# Patient Record
Sex: FEMALE | Employment: FULL TIME | URBAN - METROPOLITAN AREA
[De-identification: names, ages, dates, MRNs, and addresses within clinical notes are randomized per-mention and may not be internally consistent; named-entity substitution may affect disease eponyms.]

---

## 2017-02-02 ENCOUNTER — OFFICE VISIT (OUTPATIENT)
Dept: INTERNAL MEDICINE | Facility: CLINIC | Age: 32
End: 2017-02-02

## 2017-02-02 VITALS
HEART RATE: 68 BPM | HEIGHT: 64 IN | BODY MASS INDEX: 20.74 KG/M2 | RESPIRATION RATE: 16 BRPM | OXYGEN SATURATION: 99 % | SYSTOLIC BLOOD PRESSURE: 138 MMHG | DIASTOLIC BLOOD PRESSURE: 87 MMHG | WEIGHT: 121.5 LBS

## 2017-02-02 DIAGNOSIS — S76.302D LEFT HAMSTRING INJURY, SUBSEQUENT ENCOUNTER: Primary | ICD-10-CM

## 2017-02-02 DIAGNOSIS — R63.6 UNDERWEIGHT: ICD-10-CM

## 2017-02-02 ASSESSMENT — PAIN SCALES - GENERAL: PAINLEVEL: NO PAIN (0)

## 2017-02-02 NOTE — PATIENT INSTRUCTIONS
Primary Care Center Phone Number [phone]  Primary Care Center Medication Refill Request Information:  * Please contact your pharmacy regarding ANY request for medication refills.  ** [facility] Prescription Fax = [phone]  * Please allow 3 business days for routine medication refills.  * Please allow 5 business days for controlled substance medication refills.     Primary Care Center Test Result notification information:  *You will be notified with in 7-10 days of your appointment day regarding the results of your test.  If you are on MyChart you will be notified as soon as the provider has reviewed the results and signed off on them.        Schedule with Mary- her office will get in touch with you to schedule.    Referral for PT  Come back in about 1 month and we'll do a full physical and pap smear, breast exam, etc at that time.

## 2017-02-02 NOTE — NURSING NOTE
Chief Complaint   Patient presents with     Establish Care     pt is here to establish care with new PCP       Eileen Duran CMA at 3:54 PM on 2/2/2017

## 2017-02-02 NOTE — MR AVS SNAPSHOT
After Visit Summary   2/2/2017    Maria De Jesus Hess    MRN: [id number]           Patient Information     Date Of Birth          1985        Visit Information        Provider Department      2/2/2017 4:05 PM Majo Doherty MD [facility]        Today's Diagnoses     Left hamstring injury, subsequent encounter    -  1     Underweight           Care Instructions    Primary Care Center Phone Number [phone]  Primary Care Center Medication Refill Request Information:  * Please contact your pharmacy regarding ANY request for medication refills.  ** [facility] Prescription Fax = [phone]  * Please allow 3 business days for routine medication refills.  * Please allow 5 business days for controlled substance medication refills.     Primary Care Center Test Result notification information:  *You will be notified with in 7-10 days of your appointment day regarding the results of your test.  If you are on MyChart you will be notified as soon as the provider has reviewed the results and signed off on them.        Schedule with Mary-  office will get in touch with you to schedule.    Referral for PT  Come back in about 1 month and we'll do a full physical and pap smear, breast exam, etc at that time.         Follow-ups after your visit        Additional Services     NUTRITION REFERRAL       Your provider has referred you to: UMP:Mary Song CSC- she will call to schedule    Please be aware that coverage of these services is subject to the terms and limitations of your health insurance plan.  Call member services at your health plan with any benefit or coverage questions.      Please bring the following with you to your appointment:    (1) This referral request  (2) Any documents given to you regarding this referral  (3) Any specific questions you have about diet and/or food choices            PHYSICAL THERAPY REFERRAL       [facility] in [place]                  Who to  "contact     Please call your clinic at [phone] to:    Ask questions about your health    Make or cancel appointments    Discuss your medicines    Learn about your test results    Speak to your doctor   If you have compliments or concerns about an experience at your clinic, or if you wish to file a complaint, please contact [facility] Patient Relations at [phone] or email us at [email or web address]         Additional Information About Your Visit        MeriTaleemhart Information     Vivisimo is an electronic gateway that provides easy, online access to your medical records. With Vivisimo, you can request a clinic appointment, read your test results, renew a prescription or communicate with your care team.     To sign up for Vivisimo visit the website at [email or web address]   You will be asked to enter the access code listed below, as well as some personal information. Please follow the directions to create your username and password.     Your access code is: [email or web address]  Expires: 2017  6:30 AM     Your access code will  in 90 days. If you need help or a new code, please contact your [facility] or call [phone] for assistance.        Care EveryWhere ID     This is your Care EveryWhere ID. This could be used by other organizations to access your [facility] medical records  [id number]        Your Vitals Were     Pulse Respirations Height BMI (Body Mass Index) Pulse Oximetry Last Period    68 16 1.626 m (5' 4\") 20.85 kg/m2 99% 2017    Breastfeeding?                   No            Blood Pressure from Last 3 Encounters:   17 138/87    Weight from Last 3 Encounters:   17 55.112 kg (121 lb 8 oz)              We Performed the Following     NUTRITION REFERRAL     PHYSICAL THERAPY REFERRAL        Primary Care Provider    Pcp Unknown Verified       No address on file        Thank you!     Thank you for choosing  [facility] " [facility] CLINIC  for your care. Our goal is always to provide you with excellent care. Hearing back from our patients is one way we can continue to improve our services. Please take a few minutes to complete the written survey that you may receive in the mail after your visit with us. Thank you!             Your Updated Medication List - Protect others around you: Learn how to safely use, store and throw away your medicines at www.disposemymeds.org.      Notice  As of 2/2/2017  4:09 PM    You have not been prescribed any medications.

## 2017-02-02 NOTE — PROGRESS NOTES
"  SUBJECTIVE:                                                    Maria De Jesus Hess is a 31 year old female who presents to clinic today for the following health issues:    New Patient/Transfer of Care  No usual care. Would like a referral for PT for a hamstring injury.   Is a runner ongoing issue for last 3 years, started working at the [employer], now PT for insurance needs referral for physcial therpay. Has been seeing [facility] in [place] for last several months and would like to maintain that relationship, she reports she continues to make progress  Marathon running, last run [employer] [place] in [place], upcoming in May , [facility] [place]    Not sure when last pap smear was. May have been through [employer] at former work. May have been more than 3 years, needs to get back to work.     -------------------------------------    Problem list and histories reviewed & adjusted, as indicated.  Additional history: as documented    BP Readings from Last 3 Encounters:   02/02/17 138/87    Wt Readings from Last 3 Encounters:   02/02/17 55.112 kg (121 lb 8 oz)                    ROS:  Constitutional, HEENT, cardiovascular, pulmonary, gi and gu systems are negative, except as otherwise noted.    OBJECTIVE:                                                    /87 mmHg  Pulse 68  Resp 16  Ht 1.626 m (5' 4\")  Wt 55.112 kg (121 lb 8 oz)  BMI 20.85 kg/m2  SpO2 99%  LMP 01/22/2017  Breastfeeding? No  Body mass index is 20.85 kg/(m^2).  GENERAL: healthy, alert and no distress  EYES: Eyes grossly normal to inspection  HENT: ear canals and TM's normal, nose and mouth without ulcers or lesions  NECK: no adenopathy, no asymmetry, masses, or scars and thyroid normal to palpation  RESP: lungs clear to auscultation - no rales, rhonchi or wheezes  CV: regular rate and rhythm, normal S1 S2, no S3 or S4, no murmur, click or rub, no peripheral edema and peripheral pulses strong  ABDOMEN: soft, nontender, no hepatosplenomegaly, no masses " and bowel sounds normal  MS: no gross musculoskeletal defects noted, no edema  SKIN: no suspicious lesions or rashes  NEURO: Normal strength and tone, mentation intact and speech normal  PSYCH: mentation appears normal, affect normal/bright    Diagnostic Test Results:  none      ASSESSMENT/PLAN:                                                    1. Left hamstring injury, subsequent encounter  Refer PT external since not wanting to disrupt existing plan  - PHYSICAL THERAPY REFERRAL    2. Underweight  Refer Mary Song, has in past had problems with eating enough during training sessions for marathons  - NUTRITION REFERRAL    See Patient Instructions    Majo Doherty MD  [facility] PR[facility]

## 2017-02-15 ENCOUNTER — ALLIED HEALTH/NURSE VISIT (OUTPATIENT)
Dept: INTERNAL MEDICINE | Facility: CLINIC | Age: 32
End: 2017-02-15

## 2017-02-15 VITALS — BODY MASS INDEX: 19.86 KG/M2 | HEIGHT: 66 IN | WEIGHT: 123.6 LBS

## 2017-02-15 DIAGNOSIS — Z71.3 DIETARY COUNSELING: ICD-10-CM

## 2017-02-15 DIAGNOSIS — R63.0 ANOREXIA: Primary | ICD-10-CM

## 2017-02-15 DIAGNOSIS — E46 UNSPECIFIED PROTEIN-CALORIE MALNUTRITION (H): ICD-10-CM

## 2017-02-15 NOTE — MR AVS SNAPSHOT
After Visit Summary   2/15/2017    Maria De Jesus Hess    MRN: [id number]           Patient Information     Date Of Birth          1985        Visit Information        Provider Department      2/15/2017 12:00 PM Mary Song RD M [facility]        Today's Diagnoses     Anorexia    -  1    Unspecified protein-calorie malnutrition (H)        Dietary counseling           Follow-ups after your visit        Your next 10 appointments already scheduled     Feb 22, 2017  1:00 PM CST   NUTRITION VISIT with CRISTINA Morales [facility] ([facility])    [address] [address]   [phone]            Mar 01, 2017 12:00 PM CST   NUTRITION VISIT with CRISTINA Morales [facility] ([facility])    [address] [place] [address] [phone]            Mar 08, 2017 12:00 PM CST   NUTRITION VISIT with CRISTINA Morales [facility] ([facility])    [address] [address] [phone]            Mar 15, 2017 12:00 PM CDT   NUTRITION VISIT with CRISTINA Morales [facility] ([facility])    [address] [address] [phone]            Mar 22, 2017 12:00 PM CDT   NUTRITION VISIT with CRISTINA Morales [facility] ([facility])    [address] [address] [phone]            Mar 29, 2017 12:00 PM CDT   NUTRITION VISIT with CRISTINA Morales [email or web address] ([facility])    [address] [address] [phone]            Apr 12, 2017 12:00 PM CDT   NUTRITION VISIT with CRISTINA Morales  [facility] [employer] ([facility])    [address] [phone] [phone]            Apr 19, 2017 12:00 PM CDT   NUTRITION VISIT with CRISTINA Morales [facility] ([facility])    [address] [place] [id number] [phone]            Apr 26, 2017 12:00 PM CDT   NUTRITION VISIT with CRISTINA Morales [facility] [employer] [facility] ([facility])    [address] [place] [id number] [phone]            May 03, 2017 12:00 PM CDT   NUTRITION VISIT with CRISTINA Morales [facility] [employer] [facility] [facility] ([facility])    [address] [place] [id number] [phone]              Who to contact     Please call your clinic at [phone] to:    Ask questions about your health    Make or cancel appointments    Discuss your medicines    Learn about your test results    Speak to your doctor   If you have compliments or concerns about an experience at your clinic, or if you wish to file a complaint, please contact [facility] Patient Relations at [phone] or email us at [email or web address]         Additional Information About Your Visit        MyChart Information     Statt gives you secure access to your electronic health record. If you see a primary care provider, you can also send messages to your care team and make appointments. If you have questions, please call your primary care clinic.  If you do not have a primary care provider, please call [phone] and they will assist you.      [employer] is an electronic gateway that provides easy, online access to your medical records. With [employer], you can request a clinic appointment, read your test results, renew a prescription or communicate with your care  "team.     To access your existing account, please contact your [facility] or call [phone] for assistance.        Care EveryWhere ID     This is your Care EveryWhere ID. This could be used by other organizations to access your [facility] medical records  [id number]        Your Vitals Were     Height Last Period BMI (Body Mass Index)             1.676 m (5' 6\") 01/22/2017 19.95 kg/m2          Blood Pressure from Last 3 Encounters:   02/02/17 138/87    Weight from Last 3 Encounters:   02/15/17 56.1 kg (123 lb 9.6 oz)   02/02/17 55.1 kg (121 lb 8 oz)              We Performed the Following     MNT INDIVIDUAL INITIAL EA 15 MIN        Primary Care Provider Office Phone # Fax #    Majo Doherty [phone]       PRIMARY CARE CENTER [address] [place] [address]        Thank you!     Thank you for choosing  [facility]  for your care. Our goal is always to provide you with excellent care. Hearing back from our patients is one way we can continue to improve our services. Please take a few minutes to complete the written survey that you may receive in the mail after your visit with us. Thank you!             Your Updated Medication List - Protect others around you: Learn how to safely use, store and throw away your medicines at www.disposemymeds.org.      Notice  As of 2/15/2017 11:59 PM    You have not been prescribed any medications.      "

## 2017-02-17 NOTE — PROGRESS NOTES
"Maria De Jesus Hess  is a 31 year old female presents today for new nutrition consultation.  Maria De Jesus has been struggling with eating disorder for a long time.  She worked with me at [facility] prior to transferring her care to [facility].  The progress of treatment has been very slow due to strong eating disorder.  Short summary of prior work that focused on disruption of eating disorder symptoms (restriction), increases in food intake, decreases in physical activity and strong recommendation to work with therapist.     Vitals:  Ht 1.676 m (5' 6\")  Wt 56.1 kg (123 lb 9.6 oz)  LMP 01/22/2017  BMI 19.95 kg/m2      Nutrition History  Patient is on a regular  diet at home.  Recall:  B: none  L: 11:30:  String cheese, small coffee:  Starbucks Skinny caramel machiato  D: very small salad with cabbage salad, apple, radish, almonds,   Sn: greek yogurt and strawberries      Physical Activity  Training for marathon.  Continues to struggle with injuries, which are most likley related to restriction.      Time with Patient:  60 Minutes    Nutrition  DX:.   No diagnosis found.      Nutrition Goals:   Long term nutrition goals:  1:  Elimination of eating disorder symptoms  2:  Increase food intake to meet her nutritions and elliminate nutrient deficits that contribute to her bone health, reproductive health, and overall health.  3:  Nutrition for performance (physical, cognitive) and health.  4:  Start her work with therapist to help with recovery from eating disorder    Short term nutrition goals:  1:  Have banana for breakfast  2:  Tracking her food intake  3:  Check her insurance for recommendations for therapist.           Mary Song RD  [facility]    "

## 2017-02-22 ENCOUNTER — ALLIED HEALTH/NURSE VISIT (OUTPATIENT)
Dept: INTERNAL MEDICINE | Facility: CLINIC | Age: 32
End: 2017-02-22

## 2017-02-22 DIAGNOSIS — Z71.3 DIETARY COUNSELING: Primary | ICD-10-CM

## 2017-02-22 DIAGNOSIS — E46 UNSPECIFIED PROTEIN-CALORIE MALNUTRITION (H): ICD-10-CM

## 2017-02-22 DIAGNOSIS — F50.019 ANOREXIA NERVOSA, RESTRICTING TYPE: ICD-10-CM

## 2017-02-22 NOTE — PROGRESS NOTES
Maria De Jesus Hess  is a 31 year old female presents today for follow-up nutrition consultation.  Maria De Jesus was able to have a bread with PB after her morning run 2 times this last week.  We talked about options for therapist.       Vitals:  Wt Readings from Last 4 Encounters:   02/23/17 54.9 kg (121 lb 1.6 oz)   02/15/17 56.1 kg (123 lb 9.6 oz)   02/02/17 55.1 kg (121 lb 8 oz)       Nutrition History  Patient is on a regular  diet at home.  Recall:  B: 6:45: bread with PB  L: 11:30: Starbucks Skinny caramel machiato  D: very small salad with cabbage salad, apple, radish, almonds,   Sn: greek yogurt and strawberries      Physical Activity  Training for marathon.  Continues to struggle with injuries, which are most likley related to restriction.  She was running about 45 miles last week.     Time with Patient:  60 Minutes    Nutrition  DX:.   1. Dietary counseling    2. Anorexia nervosa, restricting type    3. Unspecified protein-calorie malnutrition (H)          Nutrition Goals:   Long term nutrition goals:  1:  Elimination of eating disorder symptoms  2:  Increase food intake to meet her nutritions and elliminate nutrient deficits that contribute to her bone health, reproductive health, and overall health.  3:  Nutrition for performance (physical, cognitive) and health.  4:  Start her work with therapist to help with recovery from eating disorder    Short term nutrition goals:  1: Tracking food intake  2: Continue with part of her breakfast after run  3: Try to add food before her run in the morning   4:  Will continue figuring out the recommendation for therapist    CRISTINA Morales [facility] SP[facility]IC[facility]

## 2017-02-22 NOTE — MR AVS SNAPSHOT
After Visit Summary   2/22/2017    Maria De Jesus Hess    MRN: [id number]           Patient Information     Date Of Birth          1985        Visit Information        Provider Department      2/22/2017 1:00 PM Mary Song RD [facility]        Today's Diagnoses     Dietary counseling    -  1    Anorexia nervosa, restricting type        Unspecified protein-calorie malnutrition (H)           Follow-ups after your visit        Your next 10 appointments already scheduled     Mar 03, 2017 12:00 PM CST   NUTRITION VISIT with CRISTINA Morales [facility] ([facility])    [address] [place] [address]   [phone]            Mar 08, 2017 12:00 PM CST   NUTRITION VISIT with CRISTINA Morales [facility] ([facility])    [address] [place] [id number] [phone]            Mar 15, 2017 12:00 PM CDT   NUTRITION VISIT with CRISTINA Morales [facility] ([facility])    [address] [place] [address] [phone]            Mar 22, 2017 12:00 PM CDT   NUTRITION VISIT with CRISTINA Morales [facility] ([facility])    [address] [place] [address] [phone]            Mar 29, 2017 12:00 PM CDT   NUTRITION VISIT with CRISTINA Morales [facility] ([facility])    [address] [place] [address] [phone]            Apr 12, 2017 12:00 PM CDT   NUTRITION VISIT with CRISTINA Morales [facility] ([facility])    [address] [place] [address] [phone]            Apr 19, 2017 12:00 PM CDT   NUTRITION VISIT  with CRISTINA Morales [facility] ([facility])    [address] [phone]            Apr 26, 2017 12:00 PM CDT   NUTRITION VISIT with CRISTINA Morales [facility] ([facility])    [address] [phone] [phone]            May 03, 2017 12:00 PM CDT   NUTRITION VISIT with CRISTINA Morales [facility] ([facility])    [address] [phone] [phone]            May 10, 2017 12:00 PM CDT   NUTRITION VISIT with CRISTINA Morales [facility] [employer] ([facility])    [address] [place] [phone] [phone]              Who to contact     Please call your clinic at [phone] to:    Ask questions about your health    Make or cancel appointments    Discuss your medicines    Learn about your test results    Speak to your doctor   If you have compliments or concerns about an experience at your clinic, or if you wish to file a complaint, please contact [facility] Patient Relations at [phone] or email us at [email or web address]         Additional Information About Your Visit        Idylishart Information     Apps[employer]t gives you secure access to your electronic health record. If you see a primary care provider, you can also send messages to your care team and make appointments. If you have questions, please call your primary care clinic.  If you do not have a primary care provider, please call [phone] and they will assist you.      [employer] is an electronic gateway that provides easy, online access to your medical records. With [employer], you can request a clinic appointment, read your test results, renew a prescription or  "communicate with your care team.     To access your existing account, please contact your [facility] or call [phone] for assistance.        Care EveryWhere ID     This is your Care EveryWhere ID. This could be used by other organizations to access your [place] medical records  [id number]        Your Vitals Were     Height Last Period BMI (Body Mass Index)             1.626 m (5' 4\") 01/22/2017 20.79 kg/m2          Blood Pressure from Last 3 Encounters:   02/02/17 138/87    Weight from Last 3 Encounters:   02/23/17 54.9 kg (121 lb 1.6 oz)   02/15/17 56.1 kg (123 lb 9.6 oz)   02/02/17 55.1 kg (121 lb 8 oz)              We Performed the Following     MNT INDIVIDUAL F/U REASSESS, EA 15 MIN        Primary Care Provider Office Phone # Fax #    Majo Doherty [phone]       PRIMARY CARE CENTER [address] [place] [address]        Thank you!     Thank you for choosing  [facility]  for your care. Our goal is always to provide you with excellent care. Hearing back from our patients is one way we can continue to improve our services. Please take a few minutes to complete the written survey that you may receive in the mail after your visit with us. Thank you!             Your Updated Medication List - Protect others around you: Learn how to safely use, store and throw away your medicines at www.disposemymeds.org.      Notice  As of 2/22/2017 11:59 PM    You have not been prescribed any medications.      "

## 2017-02-23 VITALS — HEIGHT: 64 IN | WEIGHT: 121.1 LBS | BODY MASS INDEX: 20.67 KG/M2

## 2017-03-03 ENCOUNTER — ALLIED HEALTH/NURSE VISIT (OUTPATIENT)
Dept: INTERNAL MEDICINE | Facility: CLINIC | Age: 32
End: 2017-03-03

## 2017-03-03 DIAGNOSIS — F50.019 ANOREXIA NERVOSA, RESTRICTING TYPE: ICD-10-CM

## 2017-03-03 DIAGNOSIS — Z71.3 DIETARY COUNSELING: Primary | ICD-10-CM

## 2017-03-03 DIAGNOSIS — E46 UNSPECIFIED PROTEIN-CALORIE MALNUTRITION (H): ICD-10-CM

## 2017-03-03 NOTE — MR AVS SNAPSHOT
After Visit Summary   3/3/2017    Maria De Jesus Hess    MRN: [id number]           Patient Information     Date Of Birth          1985        Visit Information        Provider Department      3/3/2017 12:00 PM Mary Song RD M [facility]        Today's Diagnoses     Dietary counseling    -  1    Unspecified protein-calorie malnutrition (H)        Anorexia nervosa, restricting type           Follow-ups after your visit        Your next 10 appointments already scheduled     Mar 08, 2017 12:00 PM CST   NUTRITION VISIT with CRISTINA Morales [facility] ([facility])    [address] [place] [address] [phone]            Mar 15, 2017 12:00 PM CDT   NUTRITION VISIT with CRISTINA Morales [facility] ([facility])    [address] [place] [address] [phone]            Mar 22, 2017 12:00 PM CDT   NUTRITION VISIT with CRISTINA Morales [facility] ([facility])    [address] [place] [address] [phone]            Mar 29, 2017 12:00 PM CDT   NUTRITION VISIT with CRISTINA Morales [facility] ([facility])    [address] [place] [address] [phone]            Apr 12, 2017 12:00 PM CDT   NUTRITION VISIT with CRISTINA Morales [facility] ([facility])    [address] [place] [address] [phone]            Apr 19, 2017 12:00 PM CDT   NUTRITION VISIT with CRISTINA Morales [facility] ([facility])    [address] [place] [address] [phone]            Apr 26, 2017 12:00 PM CDT   NUTRITION VISIT  with CRISTINA Morales [facility] ([facility])    [address] [phone] [phone]            May 03, 2017 12:00 PM CDT   NUTRITION VISIT with CRISTINA Morales [facility] ([facility])    [address] [address] [phone]            May 10, 2017 12:00 PM CDT   NUTRITION VISIT with CRISTINA Morales [facility] ([facility])    [address] [address] [phone]            May 17, 2017 12:00 PM CDT   NUTRITION VISIT with CRISTINA Morales [facility] ([facility])    [address] [place] [address]-48[address] [phone]              Who to contact     Please call your clinic at [phone] to:    Ask questions about your health    Make or cancel appointments    Discuss your medicines    Learn about your test results    Speak to your doctor   If you have compliments or concerns about an experience at your clinic, or if you wish to file a complaint, please contact [facility] Patient Relations at [phone] or email us at [email or web address]         Additional Information About Your Visit        QingCloudhart Information     [employer] gives you secure access to your electronic health record. If you see a primary care provider, you can also send messages to your care team and make appointments. If you have questions, please call your primary care clinic.  If you do not have a primary care provider, please call [phone] and they will assist you.      [employer]Wise is an electronic gateway that provides easy, online access to your medical records. With vWise, you can request a clinic appointment, read your test results, renew a prescription or  communicate with your care team.     To access your existing account, please contact your [facility] or call [phone] for assistance.        Care EveryWhere ID     This is your Care EveryWhere ID. This could be used by other organizations to access your [place] medical records  [id number]        Your Vitals Were     Last Period                   01/22/2017            Blood Pressure from Last 3 Encounters:   02/02/17 138/87    Weight from Last 3 Encounters:   02/23/17 54.9 kg (121 lb 1.6 oz)   02/15/17 56.1 kg (123 lb 9.6 oz)   02/02/17 55.1 kg (121 lb 8 oz)              We Performed the Following     MNT INDIVIDUAL F/U REASSESS, EA 15 MIN        Primary Care Provider Office Phone # Fax #    Majo Doherty [phone]       PRIMARY [facility]ARE CENTER [address] [place] [address]        Thank you!     Thank you for choosing  [facility]  for your care. Our goal is always to provide you with excellent care. Hearing back from our patients is one way we can continue to improve our services. Please take a few minutes to complete the written survey that you may receive in the mail after your visit with us. Thank you!             Your Updated Medication List - Protect others around you: Learn how to safely use, store and throw away your medicines at www.disposemymeds.org.      Notice  As of 3/3/2017  1:34 PM    You have not been prescribed any medications.

## 2017-03-03 NOTE — PROGRESS NOTES
Maria De Jesus Hess  is a 31 year old female presents today for follow-up nutrition consultation. Maria De Jesus has been trying to work hard on her nutrition, but is is really challenged to find a therapist.     Vitals:  Wt Readings from Last 4 Encounters:   02/23/17 54.9 kg (121 lb 1.6 oz)   02/15/17 56.1 kg (123 lb 9.6 oz)   02/02/17 55.1 kg (121 lb 8 oz)       Nutrition History  Patient is on a regular  diet at home.  Recall:  B: 8:00:  2 eggs, coffee  L: none and some days banana and string cheese.    D: 17:50:  1sl of bread, 2 Tbsp humus, 1c Greek yogurt,   Sn: 19:00:  2 tbsp potato starch      Physical Activity  Training for marathon.  50 miles this week.  Weights 3 times per week.     Time with Patient:  60 Minutes    Nutrition  DX:.   1. Dietary counseling    2. Unspecified protein-calorie malnutrition (H)    3. Anorexia nervosa, restricting type          Nutrition Goals:   Short term nutrition goals:  1: Tracking food intake  2: Have banana and another food group at lunch  3: Call MD to make referral for nutrition.     Long term nutrition goals:  1:  Elimination of eating disorder symptoms  2:  Increase food intake to meet her nutritions and elliminate nutrient deficits that contribute to her bone health, reproductive health, and overall health.  3:  Nutrition for performance (physical, cognitive) and health.  4:  Start her work with therapist to help with recovery from eating disorder    Mary Song, MS, RD, CSSD, LD   HEALTH

## 2017-03-08 ENCOUNTER — ALLIED HEALTH/NURSE VISIT (OUTPATIENT)
Dept: INTERNAL MEDICINE | Facility: CLINIC | Age: 32
End: 2017-03-08

## 2017-03-08 DIAGNOSIS — Z71.3 DIETARY COUNSELING: Primary | ICD-10-CM

## 2017-03-08 DIAGNOSIS — F50.019 ANOREXIA NERVOSA, RESTRICTING TYPE: ICD-10-CM

## 2017-03-08 DIAGNOSIS — E46 UNSPECIFIED PROTEIN-CALORIE MALNUTRITION (H): ICD-10-CM

## 2017-03-09 NOTE — PROGRESS NOTES
Maria De Jesus Hess  is a 31 year old female presents today for follow-up nutrition consultation. Maria De Jesus has been trying to work hard on her nutrition, but is is really challenged to find a therapist.   She was able to make an appointment for the next week.     Vitals:  Wt Readings from Last 4 Encounters:   02/23/17 54.9 kg (121 lb 1.6 oz)   02/15/17 56.1 kg (123 lb 9.6 oz)   02/02/17 55.1 kg (121 lb 8 oz)       Nutrition History  Patient is on a regular  diet at home.  Recall:  B: 8:00:  2 eggs, coffee  L: none and some days banana and string cheese.    D: 17:50:  1sl of bread, 2 Tbsp humus, 1c Greek yogurt,   Sn: 19:00:  2 tbsp potato starch, greek yogurt      Physical Activity  Training for marathon.  50 miles this week.  Weights 3 times per week.  Got injured this Monday.     Time with Patient:  60 Minutes    Nutrition  DX:.   No diagnosis found.      Nutrition Goals:   Short term nutrition goals:  1: Have breakfast 4 times per week as we described in meal ideas.       Long term nutrition goals:  1:  Elimination of eating disorder symptoms  2:  Increase food intake to meet her nutritions and elliminate nutrient deficits that contribute to her bone health, reproductive health, and overall health.  3:  Nutrition for performance (physical, cognitive) and health.  4:  Start her work with therapist to help with recovery from eating disorder    Mary Song, MS, RD, CSSD, LD  [facility]

## 2017-03-15 ENCOUNTER — OFFICE VISIT (OUTPATIENT)
Dept: PSYCHIATRY | Facility: CLINIC | Age: 32
End: 2017-03-15
Attending: PSYCHOLOGIST
Payer: COMMERCIAL

## 2017-03-15 DIAGNOSIS — F50.9 EATING DISORDER: Primary | ICD-10-CM

## 2017-03-15 NOTE — MR AVS SNAPSHOT
After Visit Summary   3/15/2017    Maria De Jesus Hess    MRN: [id number]           Patient Information     Date Of Birth          1985        Visit Information        Provider Department      3/15/2017 4:00 PM Mary Jo Ramirez, [facility]        Today's Diagnoses     Eating disorder    -  1       Follow-ups after your visit        Your next 10 appointments already scheduled     Mar 21, 2017  9:00 AM CDT   Adult Psychotherapy with Mary Jo Ramirez, PhD   Psychiatry Clinic ([facility])    [address]  [address] [facility] [address]   [phone]            Mar 23, 2017  2:00 PM CDT   NUTRITION VISIT with CRISTINA Morales [facility] ([facility])    [address] [place] [phone] [phone]            Mar 29, 2017  2:30 PM CDT   NUTRITION VISIT with CRISTINA Morales [facility] ([facility])    [address] [address] [phone]            Apr 13, 2017  2:00 PM CDT   NUTRITION VISIT with CRISTINA Morales [facility] ([facility])    [address] [address] [phone]            Apr 19, 2017  1:00 PM CDT   NUTRITION VISIT with CRISTINA Morales [facility] ([facility])    [address] [address] [phone]            Apr 26, 2017  2:30 PM CDT   NUTRITION VISIT with CRISTINA Morales [facility] ([facility])    [address] [address] [phone]            May 03, 2017  1:00 PM CDT   NUTRITION VISIT with CRISTINA Morales [facility] ([facility])     [address] [address] [phone]            May 10, 2017  1:00 PM CDT   NUTRITION VISIT with CRISTINA Morales [facility] ([facility])    [address] [place] [phone] [phone]            May 17, 2017  1:00 PM CDT   NUTRITION VISIT with CRISTINA Morales [facility] ([facility])    [address] [place] [phone]            May 24, 2017  1:00 PM CDT   NUTRITION VISIT with CRISTINA Morales [employer] ([facility])    [address] [place] [phone]              Who to contact     Please call your clinic at [phone] to:    Ask questions about your health    Make or cancel appointments    Discuss your medicines    Learn about your test results    Speak to your doctor   If you have compliments or concerns about an experience at your clinic, or if you wish to file a complaint, please contact [facility] Patient Relations at [phone] or email us at [email or web address]         Additional Information About Your Visit        [employer] Information     [employer] gives you secure access to your electronic health record. If you see a primary care provider, you can also send messages to your care team and make appointments. If you have questions, please call your primary care clinic.  If you do not have a primary care provider, please call [phone] and they will assist you.      [employer] is an electronic gateway that provides easy, online access to your medical records. With [employer], you can request a clinic appointment, read your test results, renew a prescription or communicate with your care team.     To access your existing account, please contact your [facility]  [place] [facility] or call [phone] for assistance.        Care EveryWhere ID     This is your Care EveryWhere ID. This could be used by other organizations to access your [place] medical records  [id number]         Blood Pressure from Last 3 Encounters:   02/02/17 138/87    Weight from Last 3 Encounters:   02/23/17 54.9 kg (121 lb 1.6 oz)   02/15/17 56.1 kg (123 lb 9.6 oz)   02/02/17 55.1 kg (121 lb 8 oz)              Today, you had the following     No orders found for display       Primary Care Provider Office Phone # Fax #    Majo Doherty [phone]       PRIMARY [facility]ARE CENTER [address] [place] [address]        Thank you!     Thank you for choosing [facility] CLINIC  for your care. Our goal is always to provide you with excellent care. Hearing back from our patients is one way we can continue to improve our services. Please take a few minutes to complete the written survey that you may receive in the mail after your visit with us. Thank you!             Your Updated Medication List - Protect others around you: Learn how to safely use, store and throw away your medicines at www.disposemymeds.org.      Notice  As of 3/15/2017 11:59 PM    You have not been prescribed any medications.

## 2017-03-16 ENCOUNTER — ALLIED HEALTH/NURSE VISIT (OUTPATIENT)
Dept: INTERNAL MEDICINE | Facility: CLINIC | Age: 32
End: 2017-03-16

## 2017-03-16 DIAGNOSIS — E46 UNSPECIFIED PROTEIN-CALORIE MALNUTRITION (H): ICD-10-CM

## 2017-03-16 DIAGNOSIS — Z71.3 DIETARY COUNSELING: Primary | ICD-10-CM

## 2017-03-16 DIAGNOSIS — F50.9 EATING DISORDER, UNSPECIFIED: ICD-10-CM

## 2017-03-16 NOTE — PROGRESS NOTES
"Maria De Jesus Hess  is a 31 year old female presents today for follow-up nutrition consultation. Maria De Jesus visited with therapist for the first time!!!  This is huge progress for her and especially to acknowledge that she does have an eating disorder.  Also knowing that she may run away from her nutrition appointments.    Vitals:  Wt Readings from Last 4 Encounters:   02/23/17 121 lb 1.6 oz (54.9 kg)   02/15/17 123 lb 9.6 oz (56.1 kg)   02/02/17 121 lb 8 oz (55.1 kg)       Nutrition History  Patient is on a regular  diet at home.  Recall:  B: 8:00: none  L: skinny caramel machiato  D: 17:50:  Salad with avocado, salsa, humus,   Sn: 19:00:  2 tbsp potato starch, greek yogurt, PB      Physical Activity  Training for marathon.  50 miles this week.  Weights 3 times per week.  Got injured this Monday.     Time with Patient:  45 Minutes    Nutrition  DX:.   1. Dietary counseling    2. Eating disorder, unspecified    3. Unspecified protein-calorie malnutrition (H)          Nutrition Goals:   Short term nutrition goals:  1: Start using moment diary for accountability and food intake documentation.   2:  Have \"something\" every morning.       Long term nutrition goals:  1:  Elimination of eating disorder symptoms  2:  Increase food intake to meet her nutritions and elliminate nutrient deficits that contribute to her bone health, reproductive health, and overall health.  3:  Nutrition for performance (physical, cognitive) and health.  4:  Start her work with therapist to help with recovery from eating disorder    Mary Song, MS, RD, CSSD, LD   HEALTH    "

## 2017-03-16 NOTE — MR AVS SNAPSHOT
After Visit Summary   3/16/2017    Maria De Jesus Hess    MRN: [id number]           Patient Information     Date Of Birth          1985        Visit Information        Provider Department      3/16/2017 1:30 PM Mary Song RD M [facility]        Today's Diagnoses     Dietary counseling    -  1    Eating disorder, unspecified        Unspecified protein-calorie malnutrition (H)           Follow-ups after your visit        Your next 10 appointments already scheduled     Mar 21, 2017  8:00 AM CDT   Adult Psychotherapy with Mary Jo Ramirez, PhD   Psychiatry Clinic ([facility])    [address]  [address] [facility] [address]   [phone]            Mar 22, 2017 12:00 PM CDT   NUTRITION VISIT with CRISTINA Morales [facility] ([facility])    [address] [place] [phone]            Mar 29, 2017 12:00 PM CDT   NUTRITION VISIT with CRISTINA Morales [facility] ([facility])    [address] [place] [phone]            Apr 12, 2017 12:00 PM CDT   NUTRITION VISIT with CRISTINA Morales [facility] ([facility])    [address] [place] [phone]            Apr 19, 2017 12:00 PM CDT   NUTRITION VISIT with CRISTINA Morales [facility] ([facility])    [address] [place] [address] 66[address] [phone]            Apr 26, 2017 12:00 PM CDT   NUTRITION VISIT with CRISTINA Morales [facility] ([facility])    [address] [place] [phone] [phone]            May 03, 2017 12:00 PM CDT   NUTRITION VISIT  with CRISTINA Morales [facility] ([facility])    [address] [address] [phone]            May 10, 2017 12:00 PM CDT   NUTRITION VISIT with CRISTINA Morales [facility] ([facility])    [address] [address] [phone]            May 17, 2017 12:00 PM CDT   NUTRITION VISIT with CRISTINA Morales [facility] ([facility])    [address] [address] [phone]            May 24, 2017 12:00 PM CDT   NUTRITION VISIT with CRISTINA Morales [facility] [employer] ([facility])    [address] [place] [phone] [phone]              Who to contact     Please call your clinic at [phone] to:    Ask questions about your health    Make or cancel appointments    Discuss your medicines    Learn about your test results    Speak to your doctor   If you have compliments or concerns about an experience at your clinic, or if you wish to file a complaint, please contact [facility] Patient Relations at [phone] or email us at [email or web address]         Additional Information About Your Visit        Satellierhart Information     [facility] gives you secure access to your electronic health record. If you see a primary care provider, you can also send messages to your care team and make appointments. If you have questions, please call your primary care clinic.  If you do not have a primary care provider, please call [phone] and they will assist you.      [email or web address] is an electronic gateway that provides easy, online access to your medical records. With [email or web address], you can request a clinic appointment, read your test results, renew a prescription or  communicate with your care team.     To access your existing account, please contact your [facility] or call [phone] for assistance.        Care EveryWhere ID     This is your Care EveryWhere ID. This could be used by other organizations to access your [place] medical records  [id number]         Blood Pressure from Last 3 Encounters:   02/02/17 138/87    Weight from Last 3 Encounters:   02/23/17 121 lb 1.6 oz (54.9 kg)   02/15/17 123 lb 9.6 oz (56.1 kg)   02/02/17 121 lb 8 oz (55.1 kg)              We Performed the Following     MNT INDIVIDUAL F/U REASSESS, EA 15 MIN        Primary Care Provider Office Phone # Fax #    Majo Doherty [phone]       PRIMARY CARE CENTER [address] [place] [address]        Thank you!     Thank you for choosing  [facility] [employer]  for your care. Our goal is always to provide you with excellent care. Hearing back from our patients is one way we can continue to improve our services. Please take a few minutes to complete the written survey that you may receive in the mail after your visit with us. Thank you!             Your Updated Medication List - Protect others around you: Learn how to safely use, store and throw away your medicines at www.disposemymeds.org.      Notice  As of 3/16/2017  3:09 PM    You have not been prescribed any medications.

## 2017-03-17 NOTE — PROGRESS NOTES
Diagnostic Evaluation    Provider:  Mary Jo Ramirez, Ph.D., L.P.  Patient Identification: Maria De Jesus Hess  : 1985 MRN: [id number]   Seen for 60 minute intake appointment  Limits of confidentiality and purpose of session (diagnostic intake and treatment recommendation) reviewed at the beginning of the session.    Identifying information: Maria De Jesus Hess is a 31 year old female who self-referred to the clinic at the recommendation of her RD Mary Song for an evaluation for an eating disorder.    Chief Complaint:  My RD made me come in     HPI: Maria De Jesus reported that she had been seeing an RD since spring 2016 to help with her running and her RD encouraged her to seek treatment for an eating disorder. Maria De Jesus reports that she has suffered from chronic injuries such as stress fractures that her dietician attributes to her poor nutrition. A dietary recall was done in session and indicates that Maria De Jesus does not typically eat breakfast, a mid-morning snack, lunch, or a mid-afternoon snack. Her evening meal consists of spinach with salsa,   of an avocado, cheese, hummus, bread and peanut butter. Her evening snack consists of   Greek yogurt, potato starch, fruit, and peanut butter. Maria De Jesus reported that in 2016 she lost from her highest adult weight of 128lbs (BMI = 22) to her lowest adult weight of 118 lbs (BMI = 20.3). Medical records indicate her current weight is 121 (BMI = 20.8). Maria De Jesus reported that she is currently averaging 50 miles a week of running and also does strength training, daily PT exercises, and weight lifts 3x a week. She denied binge eating, vomiting, or laxative use. She reported that she is working with her dietician on increasing her food intake and has been unable to successfully consistently increase her intake. She reported significant stress related to meeting the goals she sets with her dietician. Maria De Jesus minimized her eating disorder symptoms, noting that she thinks many runners are at lower weight  than she is. She reported that her friends and family have commented on her weight loss but  I don t see it.      Maria De Jesus reported that her mood is  fine  although she notes it has been hard to deal with her eating issues. She denied depressed mood and anhedonia. She denied any symptoms of sebastián. She denied panic attacks, symptoms of OCD, symptoms of PTSD. She noted anxiety related to food and running. She denied any thoughts of self-harm. She denied any delusions or hallucinations and there was no evidence of perceptual disturbance observed in session.     Past Psychiatric History: Maria De Jesus denied ever seeking treatment for a psychiatric concern. She denied any previous medication trials or psychotherapy trials. She denied any suicide attempts or hospitalizations.      Chemical Use History: Maria De Jesus reported that she drinks alcohol 2-3x a year. She denied any drug use or nicotine use. She reported that she drinks 4-5 caffeinated beverages a day.     Family History: Maria De Jesus reported that her sister had an eating disorder in college. Denied any family history of alcohol or substance use, violence, or suicide.      Social History: Maria De Jesus was born in [place], [place]. She was raised by her biological mother and father and has one older sister. She reported that her childhood was  good  and denied any verbal, sexual, or physical abuse. She graduated from [employer] with a degree in management and sports medicine. She denied any legal problems. She has never been  and currently lives alone. She recently started a new job in [employer] at [employer].     Mental Status Exam:  Appearance:  Casually dressed, appropriate grooming  Behavior/relationship to examiner/demeanor: Cooperative but guarded  Motor activity:  Within normal limits  Gait:  Within normal limits  Speech rate:  Within normal limits  Speech volume:  Within normal limits  Speech articulation:  Within normal limits  Speech coherence:  Within normal  limits  Mood (subjective report):   Ok   Affect (objective appearance):  Restricted  Thought process: Linear, logical, goal oriented  Thought content:  Negative for SI/HI  Abnormal Perception: None disclosed or observed  Attention/Concentration:  Not formally assessed but appeared intact  Memory:  Not formally assessed but appeared intact  Insight:  Fair    Formal Testing:  Sunshine Depression Inventory: Maria De Jesus scored out of 4 out of 63, indicating minimal depressive symptoms.  Sunshine Anxiety Inventory: Maria De Jesus scored 7 out of 63 indicating minimal anxiety symptoms.  Eating Disorder Examination: Maria De Jesus s scales scores as follows:  Restraint: Maria De Jesus scored 5.2 out of 6, which is 2.9 SD above normative mean data for her age and gender.  Eating Concerns: Maria De Jesus scored 1.6 out of 6, which is 0.8 SD above normative mean data for her age and gender.  Shape Concerns: Maria De Jesus scored 3.4 out of 6, which is 1 SD above normative mean data for her age and gender.  Weight Concerns: Maria De Jesus scored 3.2 out of 6, which is 0.5 SD above normative mean data for her age and gender.  Global Score: Maria De Jesus scored 3.35 out of 6, which is 1.4 SD above normative mean data for her age and gender.  Maria De Jesus had notable elevations of the Restraint Scale Score and Global Score.    Assessment:  Maria De Jesus is a 31 year old female who presents for an evaluation of an eating disorder at the recommendation of her dietician, Mary Song. Maria De Jesus currently has significant restriction in her energy intake as she only consistently eats dinner and a snack and is very limited in what foods she will eat. This intake is notably low as Maria De Jesus runs 50+ miles a week and engages in circuit and strength training 3x a week. She has been unable to consistently increase her intake despite working with a dietician. Maria De Jesus recently lost 10 lbs, although she continues to be a normal weight (BMI = 20.8). She was guarded during session and minimized her eating disorder symptoms. 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 Her insight appears fair.    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 Maria De Jesus expressed low motivation to change her eating disorder behaviors but stated that she  knows it s time  and is highly motivated to improve her running. We agreed to a trial of psychotherapy to address her eating disorder behaviors and motivation. Maria De Jesus will sign a CINDY to coordinate care with her RD. Follow-up in approximately one week.

## 2017-03-21 ENCOUNTER — TELEPHONE (OUTPATIENT)
Dept: PSYCHIATRY | Facility: CLINIC | Age: 32
End: 2017-03-21

## 2017-03-21 ENCOUNTER — OFFICE VISIT (OUTPATIENT)
Dept: PSYCHIATRY | Facility: CLINIC | Age: 32
End: 2017-03-21
Attending: INTERNAL MEDICINE
Payer: COMMERCIAL

## 2017-03-21 DIAGNOSIS — F50.9 EATING DISORDER: Primary | ICD-10-CM

## 2017-03-21 NOTE — PROGRESS NOTES
"CLINICAL PROGRESS NOTE      NAME: Maria De Jesus Hess  : 1985   MRN: [id number]  SESSION TYPE: Individual Therapy    CLINICIAN: Mary Jo Ramirez, Ph.D., L.P.  LENGTH OF SESSION: 50 minutes  DIAGNOSIS: F50.8: Other Specified Feeding or Eating Disorder - Atypical anorexia nervosa  Treatment: Assessed Maria De Jesus's mood and symptoms since our last appointment. Maria De Jesus reported no change in mood or symptoms. Conducted motivational interviewing to determine Maria De Jesus's readiness to change and her motivation. Maria De Jesus reported her primary motivation for treatment is to improve her running. She expressed limited motivation to change her eating habits currently as her running performance has stabilized. She reported that she does not want to engage in behavioral change at this time but \"part of me knows I need it.\" Completed a behavioral treatment plan together in session. Maria De Jesus struggled to identify goals for treatment but stated that she wanted to be able to follow her meal plan to help her running. Introduced self-monitoring in session and rationale for this strategy. Maria De Jesus currently self-monitors with an christiana and was willing to add in notes about the context and associated thoughts and feelings.  Assessment: Maria De Jesus was on time for session. She was casually dressed and appropriately groomed. Her mood was \"fine\" and her affect was euthymic although she was guarded in session. Her speech was normal in tone, rate and volume. Thought process was linear, logical, and goal oriented. No evidence of perceptual disturbances. Thought content was negative for SI/HI.   Plan: Continue psychotherapy to address motivation to change and eating disorder symptoms. Homework for the week is to continue to monitor her food intake and add in notes about context and relevant thoughts and feelings. Follow-up in approximately one week.     Mary Jo Ramirez, Ph.D., L.P.    Treatment Plan completed on: 3/21/17  Treatment Plan to be reviewed by: 17    "

## 2017-03-21 NOTE — MR AVS SNAPSHOT
After Visit Summary   3/21/2017    Maria De Jesus Hess    MRN: [id number]           Patient Information     Date Of Birth          1985        Visit Information        Provider Department      3/21/2017 9:00 AM Mary Jo Ramirez, PhD [facility]        Today's Diagnoses     Eating disorder    -  1       Follow-ups after your visit        Your next 10 appointments already scheduled     Mar 23, 2017  2:00 PM CDT   NUTRITION VISIT with [facility]INA [facility] ([facility])    [address] [place] [address]  5th [facility] [address] [phone]            Mar 28, 2017  8:00 AM CDT   Adult Psychotherapy with Mary Jo Ramirez, PhD   Psychiatry Clinic ([facility])    [address]  [address] [facility] [address]   [phone]            Mar 29, 2017  2:30 PM CDT   NUTRITION VISIT with [facility]STINA [facility] ([facility])    [address] [place] [id number] [phone]            Apr 13, 2017  2:00 PM CDT   NUTRITION VISIT with [facility]RISTINA [facility] ([facility])    [address] [place] [id number] [phone]            Apr 19, 2017  1:00 PM CDT   NUTRITION VISIT with CRISTINA [facility] ([facility])    [address] [id number] [phone]            Apr 26, 2017  2:30 PM CDT   NUTRITION VISIT with [facility] ([facility])    [address] [address] [phone]            May 03, 2017  1:00 PM CDT   NUTRITION VISIT with [facility] ([facility])     [address] [address] [phone]            May 10, 2017  1:00 PM CDT   NUTRITION VISIT with CRISTINA Morales [facility] ([facility])    [address] [place] [phone]            May 17, 2017  1:00 PM CDT   NUTRITION VISIT with CRISTINA Morales [facility] ([facility])    [address] [place] [id number] [phone]            May 24, 2017  1:00 PM CDT   NUTRITION VISIT with CRISTINA Morales [facility] [employer] ([facility])    [address] [place] [id number] [phone]              Who to contact     Please call your clinic at [phone] to:    Ask questions about your health    Make or cancel appointments    Discuss your medicines    Learn about your test results    Speak to your doctor   If you have compliments or concerns about an experience at your clinic, or if you wish to file a complaint, please contact [facility] Patient Relations at [phone] or email us at [email or web address]         Additional Information About Your Visit        [employer] Information     [employer] gives you secure access to your electronic health record. If you see a primary care provider, you can also send messages to your care team and make appointments. If you have questions, please call your primary care clinic.  If you do not have a primary care provider, please call [phone] and they will assist you.      [employer] is an electronic gateway that provides easy, online access to your medical records. With [employer], you can request a clinic appointment, read your test results, renew a prescription or communicate with your care team.     To access your existing account, please contact your [facility]  [facility] or call [phone] for assistance.        Care EveryWhere ID     This is your Care EveryWhere ID. This could be used by other organizations to access your [facility] medical records  [id number]         Blood Pressure from Last 3 Encounters:   02/02/17 138/87    Weight from Last 3 Encounters:   02/23/17 54.9 kg (121 lb 1.6 oz)   02/15/17 56.1 kg (123 lb 9.6 oz)   02/02/17 55.1 kg (121 lb 8 oz)              Today, you had the following     No orders found for display       Primary Care Provider Office Phone # Fax #    Majo Doherty [phone]       [facility] [address] [place] [address]        Thank you!     Thank you for choosing [facility]IN[facility]  for your care. Our goal is always to provide you with excellent care. Hearing back from our patients is one way we can continue to improve our services. Please take a few minutes to complete the written survey that you may receive in the mail after your visit with us. Thank you!             Your Updated Medication List - Protect others around you: Learn how to safely use, store and throw away your medicines at www.disposemymeds.org.      Notice  As of 3/21/2017  1:08 PM    You have not been prescribed any medications.

## 2017-03-23 ENCOUNTER — ALLIED HEALTH/NURSE VISIT (OUTPATIENT)
Dept: INTERNAL MEDICINE | Facility: CLINIC | Age: 32
End: 2017-03-23

## 2017-03-23 DIAGNOSIS — Z71.3 DIETARY COUNSELING: Primary | ICD-10-CM

## 2017-03-23 DIAGNOSIS — E46 UNSPECIFIED PROTEIN-CALORIE MALNUTRITION (H): ICD-10-CM

## 2017-03-23 DIAGNOSIS — F50.9 EATING DISORDER, UNSPECIFIED: ICD-10-CM

## 2017-03-23 NOTE — MR AVS SNAPSHOT
After Visit Summary   3/23/2017    Maria De Jesus Hess    MRN: [id number]           Patient Information     Date Of Birth          1985        Visit Information        Provider Department      3/23/2017 2:00 PM Mary Song RD [facility]        Today's Diagnoses     Dietary counseling    -  1    Unspecified protein-calorie malnutrition (H)        Eating disorder, unspecified           Follow-ups after your visit        Your next 10 appointments already scheduled     Mar 29, 2017  2:30 PM CDT   NUTRITION VISIT with CRISTINA Morales [facility] ([facility])    [address] [place] [address] [phone]            Apr 13, 2017  2:00 PM CDT   NUTRITION VISIT with CRISTINA Morales [facility] ([facility])    [address] [place] [id number] [phone]            Apr 19, 2017  1:00 PM CDT   NUTRITION VISIT with CRISTINA Morales [facility] ([facility])    [address] [place] [id number] [phone]            Apr 26, 2017  2:30 PM CDT   NUTRITION VISIT with CRISTINA Morales [facility] ([facility])    [address] [place] [id number] [phone]            May 03, 2017  1:00 PM CDT   NUTRITION VISIT with CRISTINA Morales [facility] ([facility])    [address] [place] [phone]            May 10, 2017  1:00 PM CDT   NUTRITION VISIT with CRISTINA Morales [facility] ([facility])    [address] [place] [phone]            May 17, 2017  1:00 PM CDT   NUTRITION VISIT with  CRISTINA Morales [facility] ([facility])    [address] [facility]  5th [facility] [address] [phone]            May 24, 2017  1:00 PM CDT   NUTRITION VISIT with CRISTINA Morales [facility] ([facility])    [address] [facility]  [address] [facility] [address] [phone]            May 31, 2017  1:00 PM CDT   NUTRITION VISIT with CRISTINA Morales [facility] ([facility])    [address] [facility]  [address] [facility] [address] [phone]              Who to contact     Please call your clinic at [phone] to:    Ask questions about your health    Make or cancel appointments    Discuss your medicines    Learn about your test results    Speak to your doctor   If you have compliments or concerns about an experience at your clinic, or if you wish to file a complaint, please contact [facility] Patient Relations at [phone] or email us at [email or web address]         Additional Information About Your Visit        "[employer] Information     "[employer] gives you secure access to your electronic health record. If you see a primary care provider, you can also send messages to your care team and make appointments. If you have questions, please call your primary care clinic.  If you do not have a primary care provider, please call [phone] and they will assist you.      "[employer] is an electronic gateway that provides easy, online access to your medical records. With "[employer] you can request a clinic appointment, read your test results, renew a prescription or communicate with your care team.     To access your existing account, please contact your [facility] or call [phone] for assistance.        Care EveryWhere ID     This is your Care EveryWhere ID. This could  be used by other organizations to access your [facility] medical records  [id number]         Blood Pressure from Last 3 Encounters:   02/02/17 138/87    Weight from Last 3 Encounters:   02/23/17 54.9 kg (121 lb 1.6 oz)   02/15/17 56.1 kg (123 lb 9.6 oz)   02/02/17 55.1 kg (121 lb 8 oz)              We Performed the Following     MNT INDIVIDUAL F/U REASSESS, EA 15 MIN        Primary Care Provider Office Phone # Fax #    Majo Doherty [phone]       [facility] [address] [place] [address]        Thank you!     Thank you for choosing  [facility] [employer]  for your care. Our goal is always to provide you with excellent care. Hearing back from our patients is one way we can continue to improve our services. Please take a few minutes to complete the written survey that you may receive in the mail after your visit with us. Thank you!             Your Updated Medication List - Protect others around you: Learn how to safely use, store and throw away your medicines at www.disposemymeds.org.      Notice  As of 3/23/2017 11:59 PM    You have not been prescribed any medications.

## 2017-03-24 NOTE — PROGRESS NOTES
Maria De Jesus Hess  is a 31 year old female presents today for follow-up nutrition consultation. Maria De Jesus had second therapy appointment and it was difficult for her.  She is debating is she has a connection with the therapist.  She does not know what to expect.     Vitals:  Wt Readings from Last 4 Encounters:   02/23/17 54.9 kg (121 lb 1.6 oz)   02/15/17 56.1 kg (123 lb 9.6 oz)   02/02/17 55.1 kg (121 lb 8 oz)       Nutrition History  Patient is on a regular  diet at home.  Recall:  B: 11:00:  Banana  L: skinny caramel machiato  D: 17:50:  Salad with avocado, salsa, humus,   Sn: 19:00:  2 tbsp potato starch, greek yogurt, PB      Physical Activity  Training for marathon.  50 miles this week.  Weights 3 times per week.     Time with Patient:  45 Minutes    Nutrition  DX:.   1. Dietary counseling    2. Unspecified protein-calorie malnutrition (H)    3. Eating disorder, unspecified          Nutrition Goals:   Short term nutrition goals:  1: Moment diary for accountability and food intake documentation.   2:  Have 2 food group 4-7 times per week.  Ideas provided.  Will be gradually increasing food intake.  Need to take small steps.        Long term nutrition goals:  1:  Elimination of eating disorder symptoms  2:  Increase food intake to meet her nutritions and elliminate nutrient deficits that contribute to her bone health, reproductive health, and overall health.  3:  Nutrition for performance (physical, cognitive) and health.  4:  Start her work with therapist to help with recovery from eating disorder    Mary Song, MS, RD, CSSD, LD  [facility]

## 2017-03-29 ENCOUNTER — ALLIED HEALTH/NURSE VISIT (OUTPATIENT)
Dept: INTERNAL MEDICINE | Facility: CLINIC | Age: 32
End: 2017-03-29

## 2017-03-29 DIAGNOSIS — Z71.3 DIETARY COUNSELING: Primary | ICD-10-CM

## 2017-03-29 DIAGNOSIS — F50.9 EATING DISORDER, UNSPECIFIED: ICD-10-CM

## 2017-03-29 DIAGNOSIS — E46 UNSPECIFIED PROTEIN-CALORIE MALNUTRITION (H): ICD-10-CM

## 2017-03-29 NOTE — MR AVS SNAPSHOT
After Visit Summary   3/29/2017    Maria De Jesus Hess    MRN: [id number]           Patient Information     Date Of Birth          1985        Visit Information        Provider Department      3/29/2017 2:30 PM Mary Song RD [facility]        Today's Diagnoses     Dietary counseling    -  1    Unspecified protein-calorie malnutrition (H)        Eating disorder, unspecified           Follow-ups after your visit        Your next 10 appointments already scheduled     Apr 13, 2017  2:00 PM CDT   NUTRITION VISIT with CRISTINA Morales [facility] ([facility])    [address] [place] [id number] [phone]            Apr 19, 2017  1:00 PM CDT   NUTRITION VISIT with CRISTINA Morales [facility] ([facility])    [address] [place] [id number] [phone]            Apr 26, 2017  2:30 PM CDT   NUTRITION VISIT with CRISTINA Morales [facility] ([facility])    [address] [place] [phone]            May 03, 2017  1:00 PM CDT   NUTRITION VISIT with CRISTINA Morales [facility] ([facility])    [address] [place] [id number] [phone]            May 10, 2017  1:00 PM CDT   NUTRITION VISIT with CRISTINA Morales [facility] ([facility])    [address] [place] [id number] [phone]            May 17, 2017  1:00 PM CDT   NUTRITION VISIT with CRISTINA Morales [facility] ([facility])    [address] [place] [address] [phone] [phone]            May 24, 2017  1:00 PM CDT   NUTRITION VISIT with  CRISTINA Morales [facility] ([facility])    [address] [facility]  [address] [facility] [address] [phone]            May 31, 2017  1:00 PM CDT   NUTRITION VISIT with CRISTINA Morales [facility] ([facility])    [address] [facility]  [address] [facility] [address] [phone]              Who to contact     Please call your clinic at [phone] to:    Ask questions about your health    Make or cancel appointments    Discuss your medicines    Learn about your test results    Speak to your doctor   If you have compliments or concerns about an experience at your clinic, or if you wish to file a complaint, please contact [facility] Patient Relations at [phone] or email us at [email or web address]         Additional Information About Your Visit        [email or web address] Information     Fototwicst gives you secure access to your electronic health record. If you see a primary care provider, you can also send messages to your care team and make appointments. If you have questions, please call your primary care clinic.  If you do not have a primary care provider, please call [phone] and they will assist you.      Solvoyo is an electronic gateway that provides easy, online access to your medical records. With Solvoyo, you can request a clinic appointment, read your test results, renew a prescription or communicate with your care team.     To access your existing account, please contact your [facility] or call [phone] for assistance.        Care EveryWhere ID     This is your Care EveryWhere ID. This could be used by other organizations to access your [place] medical records  [id number]         Blood Pressure from Last 3 Encounters:   02/02/17 138/87    Weight from Last 3 Encounters:   02/23/17 54.9 kg (121 lb 1.6 oz)   02/15/17  56.1 kg (123 lb 9.6 oz)   02/02/17 55.1 kg (121 lb 8 oz)              We Performed the Following     MNT INDIVIDUAL F/U REASSESS, EDELMIRA 15 MIN        Primary Care Provider Office Phone # Fax #    Majo Doherty [phone]       [facility] [address] [place] [address]        Thank you!     Thank you for choosing  [facility] [employer] [facility]  for your care. Our goal is always to provide you with excellent care. Hearing back from our patients is one way we can continue to improve our services. Please take a few minutes to complete the written survey that you may receive in the mail after your visit with us. Thank you!             Your Updated Medication List - Protect others around you: Learn how to safely use, store and throw away your medicines at www.disposemymeds.org.      Notice  As of 3/29/2017 11:59 PM    You have not been prescribed any medications.

## 2017-03-29 NOTE — PROGRESS NOTES
Maria De Jesus Hess  is a 31 year old female presents today for follow-up nutrition consultation. Maria De Jesus decided to work with a different therapist due to her experiences and not being able to connect in the few appointments that she had.  She did scheduled an appointment with the [facility].   She is expressing being scared.     Vitals:  Wt Readings from Last 4 Encounters:   02/23/17 54.9 kg (121 lb 1.6 oz)   02/15/17 56.1 kg (123 lb 9.6 oz)   02/02/17 55.1 kg (121 lb 8 oz)       Nutrition History  Patient is on a regular  diet at home.  Recall:  B: 11:00:  Banana with PB  L: skinny caramel machiato  D: 17:50:  Salad with avocado, salsa, humus,   Sn: 19:00:  2 tbsp potato starch, greek yogurt, PB      Physical Activity  Training for marathon.  55 miles this week.  Weights 3 times per week.   She has bonked in her long run on Saturday (19 miles).  She only had 2 kali before and during the run.     Time with Patient:  45 Minutes    Nutrition  DX:.   1. Dietary counseling    2. Unspecified protein-calorie malnutrition (H)    3. Eating disorder, unspecified          Nutrition Goals:   Short term nutrition goals:  1: Moment diary for accountability and food intake documentation.   2:  Have 3 food group 4-7 times per week.  Ideas provided.  Will be gradually increasing food intake.  Need to take small steps.    3: During this weekends run have 2 Kali with water and have gatorade instead of water      Long term nutrition goals:  1:  Elimination of eating disorder symptoms  2:  Increase food intake to meet her nutritions and elliminate nutrient deficits that contribute to her bone health, reproductive health, and overall health.  3:  Nutrition for performance (physical, cognitive) and health.  4:  Start her work with therapist to help with recovery from eating disorder    Mary Song, MS, RD, CSSD, LD  [facility]

## 2017-04-13 ENCOUNTER — ALLIED HEALTH/NURSE VISIT (OUTPATIENT)
Dept: INTERNAL MEDICINE | Facility: CLINIC | Age: 32
End: 2017-04-13

## 2017-04-13 DIAGNOSIS — Z71.3 DIETARY COUNSELING: Primary | ICD-10-CM

## 2017-04-13 DIAGNOSIS — E46 UNSPECIFIED PROTEIN-CALORIE MALNUTRITION (H): ICD-10-CM

## 2017-04-13 DIAGNOSIS — F50.9 EATING DISORDER, UNSPECIFIED: ICD-10-CM

## 2017-04-14 NOTE — PROGRESS NOTES
Maria De Jesus Hess  is a 31 year old female presents today for follow-up nutrition consultation.   Maria De Jesus seemed to connect with her current therapist, but it was only her first appointment.   She has not been able to run much due to reinjuring self.     Vitals:  Wt Readings from Last 4 Encounters:   02/23/17 54.9 kg (121 lb 1.6 oz)   02/15/17 56.1 kg (123 lb 9.6 oz)   02/02/17 55.1 kg (121 lb 8 oz)       Nutrition History  Patient is on a regular  diet at home.  Recall:  B: none  L: skinny caramel machiato  D: 17:50:  Salad with avocado, salsa, humus,   Sn: 19:00:  2 tbsp potato starch, greek yogurt, PB      Physical Activity  Training for marathon.  55 miles this week.  Weights 3 times per week.   She has bonked in her long run on Saturday (19 miles).  She only had 2 janice before and during the run.     Time with Patient:  45 Minutes    Nutrition  DX:.   1. Dietary counseling    2. Unspecified protein-calorie malnutrition (H)    3. Eating disorder, unspecified          Nutrition Goals:   Short term nutrition goals:  1: Moment diary for accountability and food intake documentation.   2:  Have greek yogurt and banana before she leaves to work instead of skipping breakfast.  Will need to go gradual due to severe restriction as well as complexity of her eating disorder    Mary Song, MS, RD, CSSD, LD  [facility]

## 2017-04-19 ENCOUNTER — ALLIED HEALTH/NURSE VISIT (OUTPATIENT)
Dept: INTERNAL MEDICINE | Facility: CLINIC | Age: 32
End: 2017-04-19

## 2017-04-19 DIAGNOSIS — Z71.3 DIETARY COUNSELING: Primary | ICD-10-CM

## 2017-04-19 DIAGNOSIS — E46 UNSPECIFIED PROTEIN-CALORIE MALNUTRITION (H): ICD-10-CM

## 2017-04-19 DIAGNOSIS — F50.9 EATING DISORDER, UNSPECIFIED: ICD-10-CM

## 2017-04-19 NOTE — PROGRESS NOTES
Maria De Jesus Hess  is a 31 year old female presents today for follow-up nutrition consultation.  She started her therapy appointments and is having a hard time with being challenged.  I am proud of her to be able to eat 3 times per day rather than once.       Vitals:  Wt Readings from Last 4 Encounters:   02/23/17 54.9 kg (121 lb 1.6 oz)   02/15/17 56.1 kg (123 lb 9.6 oz)   02/02/17 55.1 kg (121 lb 8 oz)       Nutrition History  Patient is on a regular  diet at home.  Recall:  B: 1/4c of Fairlife milk with protein powder  L: skinny caramel machiato, greek yogurt  D: 17:50:  Salad with avocado, salsa, humus,   Sn: 19:00:  2 tbsp potato starch, greek yogurt, PB      Physical Activity  Training for marathon.  40 miles this week.  Weights 3 times per week.      Time with Patient:  60 Minutes    Nutrition  DX:.   1. Dietary counseling    2. Unspecified protein-calorie malnutrition (H)    3. Eating disorder, unspecified          Nutrition Goals:   Short term nutrition goals:  1:   Continue to have above food intake and add granola on the greek yogurt for lunch and serving of grain on the salad at night.     Mary Song, MS, RD, CSSD, LD   HEALTH

## 2017-04-26 ENCOUNTER — ALLIED HEALTH/NURSE VISIT (OUTPATIENT)
Dept: INTERNAL MEDICINE | Facility: CLINIC | Age: 32
End: 2017-04-26

## 2017-04-26 DIAGNOSIS — F50.9 EATING DISORDER, UNSPECIFIED: ICD-10-CM

## 2017-04-26 DIAGNOSIS — Z71.3 DIETARY COUNSELING: Primary | ICD-10-CM

## 2017-04-26 NOTE — PROGRESS NOTES
Maria De Jesus Hess  is a 31 year old female presents today for follow-up nutrition consultation.  She started her therapy appointments and is having a hard time with being challenged.  I am proud of her to be able to eat 3 times per day rather than once.   She is noticing that she is getting more hunger.       Vitals:  Wt Readings from Last 4 Encounters:   02/23/17 54.9 kg (121 lb 1.6 oz)   02/15/17 56.1 kg (123 lb 9.6 oz)   02/02/17 55.1 kg (121 lb 8 oz)       Nutrition History  Patient is on a regular  diet at home.  Recall:  B: 1/4c of Fairlife milk with protein powder  L: greek yogurt  D: 17:50:  Salad with avocado, salsa, humus, 1 sl of bread  Sn: 19:00:  2 tbsp potato starch, greek yogurt, PB      Physical Activity  Training for marathon.  50 miles this week.  Weights 3 times per week.      Time with Patient:  30 Minutes    Nutrition  DX:.   1. Dietary counseling    2. Eating disorder, unspecified          Nutrition Goals:   1:   Increase milk to 1/2c for morning food intake  2:  Have granola (1/4c) on her greek yogurt    Mary Song, MS, RD, CSSD, LD   HEALTH

## 2017-05-03 ENCOUNTER — ALLIED HEALTH/NURSE VISIT (OUTPATIENT)
Dept: INTERNAL MEDICINE | Facility: CLINIC | Age: 32
End: 2017-05-03

## 2017-05-03 VITALS — WEIGHT: 120.2 LBS | BODY MASS INDEX: 20.52 KG/M2 | HEIGHT: 64 IN

## 2017-05-03 DIAGNOSIS — F50.9 EATING DISORDER, UNSPECIFIED: ICD-10-CM

## 2017-05-03 DIAGNOSIS — Z71.3 DIETARY COUNSELING: Primary | ICD-10-CM

## 2017-05-03 NOTE — PROGRESS NOTES
Maria De Jesus Hess  is a 31 year old female presents today for follow-up nutrition consultation.  She is continues to have therapy.  Discussed incident where she was running later in the day on Sunday and was not able to finish her run because she felt disoriented.  I am glad that she was able to stop but she did not have her cell phone and also she was running alone.  She is noticing that she hungrier in the mornings and that she is also       Vitals:  Wt Readings from Last 4 Encounters:   05/03/17 120 lb 3.2 oz (54.5 kg)   02/23/17 121 lb 1.6 oz (54.9 kg)   02/15/17 123 lb 9.6 oz (56.1 kg)   02/02/17 121 lb 8 oz (55.1 kg)       Nutrition History  Patient is on a regular  diet at home.  Recall:  B: 1 packet of BiPro with water   L: greek yogurt  D: 17:50:  Salad with avocado, salsa, humus, 1 sl of bread  Sn: 19:00:  2 tbsp potato starch, greek yogurt, PB      Physical Activity  Training for marathon.  40 miles this week.  Weights 3 times per week.      Time with Patient:  30 Minutes    Nutrition  DX:.   1. Dietary counseling    2. Eating disorder, unspecified          Nutrition Goals:   1:   Increase milk to 1/2c for morning food intake  2:  Have granola (1/4c) on her greek yogurt  3:  Have Gu and 2 Tbsp of potato starch before 10mile run this weekend.     Mary Song, MS, RD, CSSD, LD  [facility]

## 2017-05-03 NOTE — MR AVS SNAPSHOT
After Visit Summary   5/3/2017    Maria De Jesus Hess    MRN: [id number]           Patient Information     Date Of Birth          1985        Visit Information        Provider Department      5/3/2017 1:00 PM Mary Song RD [facility]        Today's Diagnoses     Dietary counseling    -  1    Eating disorder, unspecified           Follow-ups after your visit        Your next 10 appointments already scheduled     May 10, 2017  1:00 PM CDT   NUTRITION VISIT with CRISTINA Morales [facility] ([facility])    [address] [address] [phone]            May 17, 2017  1:00 PM CDT   NUTRITION VISIT with CRISTINA Morales [facility] ([facility])    [address] [place] [id number] [phone]            May 24, 2017  1:00 PM CDT   NUTRITION VISIT with CRISTINA Morales [facility] ([facility])    [address] [phone] [phone]            May 31, 2017  1:00 PM CDT   NUTRITION VISIT with CRISTINA [facility]ales [facility] ([facility])    [address] [address] [phone]            Jun 07, 2017  1:00 PM CDT   NUTRITION VISIT with CRISTINA [facility]ales [facility] ([facility])    [address] [phone] [phone]            Jun 14, 2017  1:00 PM CDT   NUTRITION VISIT with CRISTINA Morales [facility] ([facility])    [address] [phone] [phone]            Jun 21, 2017  1:00 PM CDT   NUTRITION VISIT with CRISTINA Morales [facility]  ([facility])    [address] [facility]  [address] [facility] [id number] [phone]            Jul 19, 2017  1:00 PM CDT   NUTRITION VISIT with Mary Song RD   [facility] ([facility])    [address] [phone] [phone]            Jul 26, 2017  1:00 PM CDT   NUTRITION VISIT with CRISTINA Morales [facility] ([facility])    [address] [phone] [phone]              Who to contact     Please call your clinic at [phone] to:    Ask questions about your health    Make or cancel appointments    Discuss your medicines    Learn about your test results    Speak to your doctor   If you have compliments or concerns about an experience at your clinic, or if you wish to file a complaint, please contact [facility] Patient Relations at [phone] or email us at [email or web address]         Additional Information About Your Visit        CodeStreethart Information     Niwa gives you secure access to your electronic health record. If you see a primary care provider, you can also send messages to your care team and make appointments. If you have questions, please call your primary care clinic.  If you do not have a primary care provider, please call [phone] and they will assist you.      Niwa is an electronic gateway that provides easy, online access to your medical records. With Niwa, you can request a clinic appointment, read your test results, renew a prescription or communicate with your care team.     To access your existing account, please contact your [facility] or call [phone] for assistance.        Care EveryWhere ID     This is your Care EveryWhere ID. This could be used by other organizations to access your [place]  "medical records  [id number]        Your Vitals Were     Height BMI (Body Mass Index)                5' 4\" (1.626 m) 20.63 kg/m2           Blood Pressure from Last 3 Encounters:   02/02/17 138/87    Weight from Last 3 Encounters:   05/03/17 120 lb 3.2 oz (54.5 kg)   02/23/17 121 lb 1.6 oz (54.9 kg)   02/15/17 123 lb 9.6 oz (56.1 kg)              We Performed the Following     MNT INDIVIDUAL F/U REASSESS, EA 15 MIN        Primary Care Provider Office Phone # Fax #    Majo Doherty [phone]       PRIMARY [facility]ARE CENTER [address] [place] [address]        Thank you!     Thank you for choosing  [facility] [employer]  for your care. Our goal is always to provide you with excellent care. Hearing back from our patients is one way we can continue to improve our services. Please take a few minutes to complete the written survey that you may receive in the mail after your visit with us. Thank you!             Your Updated Medication List - Protect others around you: Learn how to safely use, store and throw away your medicines at www.disposemymeds.org.      Notice  As of 5/3/2017  2:05 PM    You have not been prescribed any medications.      "

## 2017-05-10 ENCOUNTER — ALLIED HEALTH/NURSE VISIT (OUTPATIENT)
Dept: INTERNAL MEDICINE | Facility: CLINIC | Age: 32
End: 2017-05-10

## 2017-05-10 DIAGNOSIS — Z71.3 DIETARY COUNSELING: Primary | ICD-10-CM

## 2017-05-10 DIAGNOSIS — E46 UNSPECIFIED PROTEIN-CALORIE MALNUTRITION (H): ICD-10-CM

## 2017-05-10 DIAGNOSIS — F50.9 EATING DISORDER, UNSPECIFIED: ICD-10-CM

## 2017-05-10 NOTE — PROGRESS NOTES
Maria De Jesus Hess  is a 31 year old female presents today for follow-up nutrition consultation.  She is continues to have therapy.  She also would like to quit therapy and nutrition.  This had happened before and she typically likes to quit when it is had, but also know that she gets worse if she is not here.        Vitals:  Wt Readings from Last 4 Encounters:   05/03/17 120 lb 3.2 oz (54.5 kg)   02/23/17 121 lb 1.6 oz (54.9 kg)   02/15/17 123 lb 9.6 oz (56.1 kg)   02/02/17 121 lb 8 oz (55.1 kg)       Nutrition History  Patient is on a regular  diet at home.  Recall:  B: 1 packet of BiPro with 1/2c Fairlife milk  L: greek yogurt with 1/4c of granola  D: 17:50:  Salad with avocado, salsa, humus, 1 sl of bread  Sn: 19:00:  2 tbsp potato starch, greek yogurt, PB      Physical Activity  Training for marathon.  40 miles this week.  Weights 3 times per week.      Time with Patient:  30 Minutes    Nutrition  DX:.   1. Dietary counseling    2. Unspecified protein-calorie malnutrition (H)    3. Eating disorder, unspecified          Nutrition Goals:   1:   Increase milk to 1c for morning food intake  2:  Have 1sl of bread with greek yogurt  3:  Execute nutrition plan for marathon:  Gu mixed with 2Tbsp of potato starch 30 min prior the race, drink one gulp of gatorade and 1 gulp of water at every stop, have 3 gu spread evenly during the race (if family traveling, maybe they can hand another gu mixed with 2Tbsp of potato starch), try run gum for the last part of the race.     Mary Song, MS, RD, CSSD, LD  [facility]

## 2017-05-10 NOTE — MR AVS SNAPSHOT
After Visit Summary   5/10/2017    Maria De Jesus Hess    MRN: [id number]           Patient Information     Date Of Birth          1985        Visit Information        Provider Department      5/10/2017 1:00 PM Mary Song RD [facility]        Today's Diagnoses     Dietary counseling    -  1    Unspecified protein-calorie malnutrition (H)        Eating disorder, unspecified           Follow-ups after your visit        Your next 10 appointments already scheduled     May 17, 2017  1:00 PM CDT   NUTRITION VISIT with CRISTINA Morales [facility] ([facility])    [address] [place] [address] [phone]            May 24, 2017  1:00 PM CDT   NUTRITION VISIT with CRISTINA Morales [facility] ([facility])    [address] [place] [id number] [phone]            May 31, 2017  1:00 PM CDT   NUTRITION VISIT with CRISTINA Morales [facility] ([facility])    [address] [place] [id number] [phone]            Jun 07, 2017  1:00 PM CDT   NUTRITION VISIT with CRISTINA Morales [facility] ([facility])    [address] [place] [id number] [phone]            Jun 14, 2017  1:00 PM CDT   NUTRITION VISIT with CRISTINA Morales [facility] ([facility])    [address] [place] [phone]            Jun 21, 2017  1:00 PM CDT   NUTRITION VISIT with CRISTINA Morales [facility] ([facility])    [address] [place] [id number] [phone]            Jul 19, 2017  1:00 PM CDT   NUTRITION VISIT with  CRISTINA Morales [facility] ([facility])    [address] [facility]  [address] [facility] [address] [phone]            Jul 26, 2017  1:00 PM CDT   NUTRITION VISIT with CRISTINA Morales [employer] [facility] [employer] ([facility])    [address] [facility]  [address] [facility] [address] [phone]              Who to contact     Please call your clinic at [phone] to:    Ask questions about your health    Make or cancel appointments    Discuss your medicines    Learn about your test results    Speak to your doctor   If you have compliments or concerns about an experience at your clinic, or if you wish to file a complaint, please contact [facility] Patient Relations at [phone] or email us at [email or web address]         Additional Information About Your Visit        NotesFirstharTimbuktu Labs Information     SanteVett gives you secure access to your electronic health record. If you see a primary care provider, you can also send messages to your care team and make appointments. If you have questions, please call your primary care clinic.  If you do not have a primary care provider, please call [phone] and they will assist you.      [employer] is an electronic gateway that provides easy, online access to your medical records. With [employer], you can request a clinic appointment, read your test results, renew a prescription or communicate with your care team.     To access your existing account, please contact your [facility] Clinic or call [phone] for assistance.        Care EveryWhere ID     This is your Care EveryWhere ID. This could be used by other organizations to access your [place] medical records  [id number]         Blood Pressure from Last 3 Encounters:   02/02/17 138/87    Weight from Last 3 Encounters:   05/03/17 120 lb 3.2 oz (54.5 kg)   02/23/17  121 lb 1.6 oz (54.9 kg)   02/15/17 123 lb 9.6 oz (56.1 kg)              We Performed the Following     MNT INDIVIDUAL F/U REASSESS, EA 15 MIN        Primary Care Provider Office Phone # Fax #    Majo Doherty [phone]       PRIMARY [facility]ARE CENTER [address] [place] [address]        Thank you!     Thank you for choosing  [facility] [employer]  for your care. Our goal is always to provide you with excellent care. Hearing back from our patients is one way we can continue to improve our services. Please take a few minutes to complete the written survey that you may receive in the mail after your visit with us. Thank you!             Your Updated Medication List - Protect others around you: Learn how to safely use, store and throw away your medicines at www.disposemymeds.org.      Notice  As of 5/10/2017  2:47 PM    You have not been prescribed any medications.

## 2017-05-24 ENCOUNTER — ALLIED HEALTH/NURSE VISIT (OUTPATIENT)
Dept: INTERNAL MEDICINE | Facility: CLINIC | Age: 32
End: 2017-05-24

## 2017-05-24 DIAGNOSIS — Z71.3 DIETARY COUNSELING: Primary | ICD-10-CM

## 2017-05-24 DIAGNOSIS — F50.019 ANOREXIA NERVOSA, RESTRICTING TYPE: ICD-10-CM

## 2017-05-24 DIAGNOSIS — E46 UNSPECIFIED PROTEIN-CALORIE MALNUTRITION (H): ICD-10-CM

## 2017-05-24 NOTE — MR AVS SNAPSHOT
After Visit Summary   5/24/2017    Maria De Jesus Hess    MRN: [id number]           Patient Information     Date Of Birth          1985        Visit Information        Provider Department      5/24/2017 1:00 PM Mary Song RD [facility]        Today's Diagnoses     Dietary counseling    -  1    Anorexia nervosa, restricting type        Unspecified protein-calorie malnutrition (H)           Follow-ups after your visit        Your next 10 appointments already scheduled     May 31, 2017  1:00 PM CDT   NUTRITION VISIT with CRISTINA Morales [facility] ([facility])    [address] [place] [id number] [phone]            Jun 07, 2017  1:00 PM CDT   NUTRITION VISIT with CRISTINA Morales [facility] ([facility])    [address] [place] [address] [phone] [phone]            Jun 14, 2017  1:00 PM CDT   NUTRITION VISIT with CRISTINA Morales [facility] ([facility])    [address] [place] [phone]            Jun 21, 2017  1:00 PM CDT   NUTRITION VISIT with CRISTINA Morales [facility] ([facility])    [address] [place] [address] [phone]            Jul 19, 2017  1:00 PM CDT   NUTRITION VISIT with CRISTINA Morales [facility] ([facility])    [address] [place] [address] [phone] [phone]            Jul 26, 2017  1:00 PM CDT   NUTRITION VISIT with CRISTINA Morales [facility] ([facility])    [address] [place] [address] [phone] [phone]              Who to contact     Please call your clinic  at [phone] to:    Ask questions about your health    Make or cancel appointments    Discuss your medicines    Learn about your test results    Speak to your doctor   If you have compliments or concerns about an experience at your clinic, or if you wish to file a complaint, please contact [facility] Patient Relations at [phone] or email us at [email or web address]         Additional Information About Your Visit        Urban Massagehart Information     MovieLaLat gives you secure access to your electronic health record. If you see a primary care provider, you can also send messages to your care team and make appointments. If you have questions, please call your primary care clinic.  If you do not have a primary care provider, please call [phone] and they will assist you.      Sohalo is an electronic gateway that provides easy, online access to your medical records. With Sohalo, you can request a clinic appointment, read your test results, renew a prescription or communicate with your care team.     To access your existing account, please contact your [facility] or call [phone] for assistance.        Care EveryWhere ID     This is your Care EveryWhere ID. This could be used by other organizations to access your [facility] medical records  [id number]         Blood Pressure from Last 3 Encounters:   02/02/17 138/87    Weight from Last 3 Encounters:   05/03/17 54.5 kg (120 lb 3.2 oz)   02/23/17 54.9 kg (121 lb 1.6 oz)   02/15/17 56.1 kg (123 lb 9.6 oz)              We Performed the Following     MNT INDIVIDUAL F/U REASSESS, EA 15 MIN        Primary Care Provider Office Phone # Fax #    Majo Doherty [phone]       [facility] [address] [place] [address]        Thank you!     Thank you for choosing  [facility] [place]  for your care. Our goal is always to provide you with  excellent care. Hearing back from our patients is one way we can continue to improve our services. Please take a few minutes to complete the written survey that you may receive in the mail after your visit with us. Thank you!             Your Updated Medication List - Protect others around you: Learn how to safely use, store and throw away your medicines at www.disposemymeds.org.      Notice  As of 5/24/2017  3:37 PM    You have not been prescribed any medications.

## 2017-05-24 NOTE — PROGRESS NOTES
Maria De Jesus Hess  is a 31 year old female presents today for follow-up nutrition consultation.  She is continues to have therapy.  She also would like to quit therapy and nutrition.  This had happened before and she typically likes to quit when it is had, but also know that she gets worse if she is not here.  Discussed her last few weeks where she did run marathon and had bloody nose during the marathon.  She is blaming that eating during the marathon was the cause of not running well.  Challenged her to see that probably lack of fuel was the contributing factor.    During marathon:  30 min prior:  1 Gu with potato starch  Mile 7: 18-20:  Gu  Mile 13:  Gu with potato starch      Vitals:  Wt Readings from Last 4 Encounters:   05/03/17 54.5 kg (120 lb 3.2 oz)   02/23/17 54.9 kg (121 lb 1.6 oz)   02/15/17 56.1 kg (123 lb 9.6 oz)   02/02/17 55.1 kg (121 lb 8 oz)       Nutrition History  Patient is on a regular  diet at home.  Recall:  B: 1 packet of BiPro with water  L: 2 string cheeses, caramel machiato  D: 17:50:  Salad with avocado, salsa, humus, 1 sl of bread  Sn: 19:00:  2 tbsp potato starch, greek yogurt, PB      Physical Activity  Just finished marathon, but want to train for a half as well [place].  30 miles of running this week.  Weights 3 times per week.      Time with Patient:  30 Minutes    Nutrition  DX:.   1. Dietary counseling    2. Anorexia nervosa, restricting type    3. Unspecified protein-calorie malnutrition (H)          Nutrition Goals:   1:   Increase milk to 1c for morning food intake  2:  Check weight only every other day rather than every day      Mary Song, MS, RD, CSSD, LD  [facility]

## 2017-05-31 ENCOUNTER — ALLIED HEALTH/NURSE VISIT (OUTPATIENT)
Dept: INTERNAL MEDICINE | Facility: CLINIC | Age: 32
End: 2017-05-31

## 2017-05-31 DIAGNOSIS — Z71.3 DIETARY COUNSELING: Primary | ICD-10-CM

## 2017-05-31 DIAGNOSIS — E46 UNSPECIFIED PROTEIN-CALORIE MALNUTRITION (H): ICD-10-CM

## 2017-05-31 DIAGNOSIS — F50.019 ANOREXIA NERVOSA, RESTRICTING TYPE: ICD-10-CM

## 2017-05-31 NOTE — PROGRESS NOTES
Maria De Jesus Hess  is a 31 year old female presents today for follow-up nutrition consultation.  She is continues to have therapy.  She continues to struggle with restriction.  Discussed making MD appointment.  Very strong recommendation.   Ran 5K this last weekend and ran it well.       Vitals:  Wt Readings from Last 4 Encounters:   05/03/17 54.5 kg (120 lb 3.2 oz)   02/23/17 54.9 kg (121 lb 1.6 oz)   02/15/17 56.1 kg (123 lb 9.6 oz)   02/02/17 55.1 kg (121 lb 8 oz)       Nutrition History  Patient is on a regular  diet at home.  Recall:  B: 1 packet of BiPro with 4oz of Fairlife  L: 2 string cheeses  D: 17:50:  Salad with avocado, salsa, humus, 1 sl of bread  Sn: 19:00:  2 tbsp potato starch, greek yogurt, PB      Physical Activity  30 miles per week running. Still wants to do [place]     Time with Patient:  30 Minutes    Nutrition  DX:.   1. Dietary counseling    2. Anorexia nervosa, restricting type    3. Unspecified protein-calorie malnutrition (H)          Nutrition Goals:   1:   Increase milk to 1c for morning food intake and add banana every day        Mary Song, MS, RD, CSSD, LD  [facility]

## 2017-05-31 NOTE — MR AVS SNAPSHOT
After Visit Summary   5/31/2017    Maria De Jesus Hess    MRN: [id number]           Patient Information     Date Of Birth          1985        Visit Information        Provider Department      5/31/2017 1:00 PM Mary Song RD [facility]        Today's Diagnoses     Dietary counseling    -  1    Anorexia nervosa, restricting type        Unspecified protein-calorie malnutrition (H)           Follow-ups after your visit        Your next 10 appointments already scheduled     Jun 07, 2017  1:00 PM CDT   NUTRITION VISIT with CRISTINA Morales [facility] ([facility])    [address] [place] [phone]            Jun 14, 2017  1:00 PM CDT   NUTRITION VISIT with CRISTINA Morales [facility] ([facility])    [address] [place] [phone]            Jun 22, 2017  2:00 PM CDT   NUTRITION VISIT with CRISTINA Morales [facility] ([facility])    [address] [place] [phone]            Jul 19, 2017  1:00 PM CDT   NUTRITION VISIT with CRISTINA Morales [facility] ([facility])    [address] [place] [phone]            Jul 26, 2017  1:00 PM CDT   NUTRITION VISIT with CRISTINA Morales [facility] ([facility])    [address] [place] [address] [phone]              Who to contact     Please call your clinic at [phone] to:    Ask questions about your health    Make or cancel appointments    Discuss your medicines    Learn about your test results    Speak to your doctor   If you have compliments or concerns about an experience at  your clinic, or if you wish to file a complaint, please contact [facility] Patient Relations at [phone] or email us at [email or web address]         Additional Information About Your Visit        PayByGrouphart Information     Methodt gives you secure access to your electronic health record. If you see a primary care provider, you can also send messages to your care team and make appointments. If you have questions, please call your primary care clinic.  If you do not have a primary care provider, please call [phone] and they will assist you.      Bandwave Systems is an electronic gateway that provides easy, online access to your medical records. With Bandwave Systems, you can request a clinic appointment, read your test results, renew a prescription or communicate with your care team.     To access your existing account, please contact your [facility] or call [phone] for assistance.        Care EveryWhere ID     This is your Care EveryWhere ID. This could be used by other organizations to access your [facility] medical records  [id number]         Blood Pressure from Last 3 Encounters:   02/02/17 138/87    Weight from Last 3 Encounters:   05/03/17 54.5 kg (120 lb 3.2 oz)   02/23/17 54.9 kg (121 lb 1.6 oz)   02/15/17 56.1 kg (123 lb 9.6 oz)              We Performed the Following     MNT INDIVIDUAL F/U REASSESS, EA 15 MIN        Primary Care Provider Office Phone # Fax #    Majo Doherty [phone]       [facility] [address] [place] [address]        Thank you!     Thank you for choosing  [facility]  for your care. Our goal is always to provide you with excellent care. Hearing back from our patients is one way we can continue to improve our services. Please take a few minutes to complete the written survey that you may receive in the mail after your visit with us. Thank you!              Your Updated Medication List - Protect others around you: Learn how to safely use, store and throw away your medicines at www.disposemymeds.org.      Notice  As of 5/31/2017  2:08 PM    You have not been prescribed any medications.

## 2017-06-14 ENCOUNTER — ALLIED HEALTH/NURSE VISIT (OUTPATIENT)
Dept: INTERNAL MEDICINE | Facility: CLINIC | Age: 32
End: 2017-06-14

## 2017-06-14 DIAGNOSIS — Z71.3 DIETARY COUNSELING: Primary | ICD-10-CM

## 2017-06-14 DIAGNOSIS — F50.019 ANOREXIA NERVOSA, RESTRICTING TYPE: ICD-10-CM

## 2017-06-14 NOTE — MR AVS SNAPSHOT
After Visit Summary   6/14/2017    Maria De Jesus Hess    MRN: [id number]           Patient Information     Date Of Birth          1985        Visit Information        Provider Department      6/14/2017 1:00 PM Mary Song RD [facility]        Today's Diagnoses     Dietary counseling    -  1    Anorexia nervosa, restricting type           Follow-ups after your visit        Your next 10 appointments already scheduled     Jun 22, 2017  2:00 PM CDT   NUTRITION VISIT with CRISTINA Morales [facility] ([facility])    [address] [place] [address] [phone]            Jul 19, 2017  1:00 PM CDT   NUTRITION VISIT with CRISTINA Morales [facility] ([facility])    [address] [place] [phone]            Jul 26, 2017  1:00 PM CDT   NUTRITION VISIT with CRISTINA Morales [facility] ([facility])    [address] [place] [phone]              Who to contact     Please call your clinic at [phone] to:    Ask questions about your health    Make or cancel appointments    Discuss your medicines    Learn about your test results    Speak to your doctor   If you have compliments or concerns about an experience at your clinic, or if you wish to file a complaint, please contact [facility] Patient Relations at [phone] or email us at [email or web address]         Additional Information About Your Visit        MyChart Information     EG Technologyt gives you secure access to your electronic health record. If you see a primary care provider, you can also send messages to your care team and make appointments. If you have questions, please call your primary care clinic.  If you do not have a  primary care provider, please call [phone] and they will assist you.      DigiSat Technology is an electronic gateway that provides easy, online access to your medical records. With Digi[employer] Technology, you can request a clinic appointment, read your test results, renew a prescription or communicate with your care team.     To access your existing account, please contact your [facility] or call [phone] for assistance.        Care EveryWhere ID     This is your Care EveryWhere ID. This could be used by other organizations to access your [place] medical records  [id number]         Blood Pressure from Last 3 Encounters:   02/02/17 138/87    Weight from Last 3 Encounters:   05/03/17 120 lb 3.2 oz (54.5 kg)   02/23/17 121 lb 1.6 oz (54.9 kg)   02/15/17 123 lb 9.6 oz (56.1 kg)              We Performed the Following     MNT INDIVIDUAL F/U REASSESS, EA 15 MIN        Primary Care Provider Office Phone # Fax #    Majo Doherty [phone]       PRIMARY [facility]ARE CENTER [address] [place] [address]        Thank you!     Thank you for choosing [facility]  for your care. Our goal is always to provide you with excellent care. Hearing back from our patients is one way we can continue to improve our services. Please take a few minutes to complete the written survey that you may receive in the mail after your visit with us. Thank you!             Your Updated Medication List - Protect others around you: Learn how to safely use, store and throw away your medicines at www.disposemymeds.org.      Notice  As of 6/14/2017  3:11 PM    You have not been prescribed any medications.

## 2017-06-14 NOTE — PROGRESS NOTES
Maria De Jesus Hess  is a 31 year old female presents today for follow-up nutrition consultation.  She wanted to quit coming to appointments and also does not want to change.  She also noticed that things got worse with restriction since I asked her not to weigh self.   She did check her weight though.  Challenged her to know that weighing was an eating disorder tool.   Her family is worried and her mom talked to her dad.  Also her friend, who worked as an RD in eating disorder clinic is worries.  That is the main reason shy she is here today.        Vitals:  Wt Readings from Last 4 Encounters:   05/03/17 120 lb 3.2 oz (54.5 kg)   02/23/17 121 lb 1.6 oz (54.9 kg)   02/15/17 123 lb 9.6 oz (56.1 kg)   02/02/17 121 lb 8 oz (55.1 kg)       Nutrition History  Patient is on a regular  diet at home.  Recall from her last appointment, she did not want to talk about her food today.:  B: 1 packet of BiPro with 4oz of Fairlife  L: 2 string cheeses  D: 17:50:  Salad with avocado, salsa, humus, 1 sl of bread  Sn: 19:00:  2 tbsp potato starch, greek yogurt, PB      Physical Activity  30 miles per week running. Still wants to do [place]     Time with Patient:  30 Minutes    Nutrition  DX:.   1. Dietary counseling    2. Anorexia nervosa, restricting type      She has severe eating disorder.  Needs higher level care, but even being able to come to nutrition and therapy appointments is a struggle.     Nutrition Goals:   1:   Read an article about neurobiology of brain in eating disorder  2:  Fuel your body when she feel irritable, angry, tired.   3:  Discussed worries about her condition and asked to see MD.  Discussion about regular MD appointments have been addressed in the last few months.  Will continue explain the importance of medical appointment and worries about her strong eating disorder        Mary Song, MS, RD, CSSD, LD  [facility]

## 2017-06-22 ENCOUNTER — ALLIED HEALTH/NURSE VISIT (OUTPATIENT)
Dept: INTERNAL MEDICINE | Facility: CLINIC | Age: 32
End: 2017-06-22

## 2017-06-22 DIAGNOSIS — F50.019 ANOREXIA NERVOSA, RESTRICTING TYPE: ICD-10-CM

## 2017-06-22 DIAGNOSIS — Z71.3 DIETARY COUNSELING: Primary | ICD-10-CM

## 2017-06-22 NOTE — PROGRESS NOTES
Maria De Jesus Hess  is a 31 year old female presents today for follow-up nutrition consultation.  Maria De Jesus seemed to have a better week, but I suspect that it is related to more running and better half marathon at [facility].  She continues to weigh self.         Vitals:  Wt Readings from Last 4 Encounters:   06/23/17 53.2 kg (117 lb 4.8 oz)   05/03/17 54.5 kg (120 lb 3.2 oz)   02/23/17 54.9 kg (121 lb 1.6 oz)   02/15/17 56.1 kg (123 lb 9.6 oz)       Nutrition History  Patient is on a regular  diet at home.  Recall from her last appointment, she did not want to talk about her food today.:  14:00:  2 string cheese, Greek yogurt  19:00:  Tur[place] sandwich with 2 sl of bread, lettuce, tomato, onion  19:00:  2 tbsp potato starch, greek yogurt, PB      Physical Activity  45 miles per week running. Still wants to do [place]     Time with Patient:  30 Minutes    Nutrition  DX:.   1. Dietary counseling    2. Anorexia nervosa, restricting type      She has severe eating disorder.  Needs higher level care, but even being able to come to nutrition and therapy appointments is a struggle.     Nutrition Goals:   1:   Make an appointment with MD.  Introduced MD to her.  2:  Have 8oz of Fairlife, banana, and BiPro, text breakfast     Concern about her health and well being.  She needs higher level care.  Expressed my concerns      Mary Song, MS, RD, CSSD, LD  [facility]

## 2017-06-23 VITALS — HEIGHT: 64 IN | WEIGHT: 117.3 LBS | BODY MASS INDEX: 20.03 KG/M2

## 2017-07-06 ENCOUNTER — TELEPHONE (OUTPATIENT)
Dept: ORTHOPEDICS | Facility: CLINIC | Age: 32
End: 2017-07-06

## 2017-07-06 NOTE — TELEPHONE ENCOUNTER
Called patient back with regards to a sooner appointment. I informed her that she can get on the fellows schedule on 7/13/17 ( or ) and be seen sooner in conjunction with . Gave her the number to call and reschedule.

## 2017-07-19 ENCOUNTER — ALLIED HEALTH/NURSE VISIT (OUTPATIENT)
Dept: INTERNAL MEDICINE | Facility: CLINIC | Age: 32
End: 2017-07-19

## 2017-07-19 VITALS — BODY MASS INDEX: 20.37 KG/M2 | WEIGHT: 119.3 LBS | HEIGHT: 64 IN

## 2017-07-19 DIAGNOSIS — F50.019 ANOREXIA NERVOSA, RESTRICTING TYPE: ICD-10-CM

## 2017-07-19 DIAGNOSIS — Z71.3 DIETARY COUNSELING: Primary | ICD-10-CM

## 2017-07-19 DIAGNOSIS — E46 UNSPECIFIED PROTEIN-CALORIE MALNUTRITION (H): ICD-10-CM

## 2017-07-19 NOTE — MR AVS SNAPSHOT
After Visit Summary   7/19/2017    Maria De Jesus Hess    MRN: [id number]           Patient Information     Date Of Birth          1985        Visit Information        Provider Department      7/19/2017 1:00 PM Mary Song RD [facility]        Today's Diagnoses     Dietary counseling    -  1    Anorexia nervosa, restricting type        Unspecified protein-calorie malnutrition (H)           Follow-ups after your visit        Your next 10 appointments already scheduled     Jul 26, 2017  1:00 PM CDT   NUTRITION VISIT with CRISTINA Morales [facility] ([facility])    [address] [place] [address] [id number] [phone]            Aug 02, 2017  1:00 PM CDT   NUTRITION VISIT with CRISTINA Morales [facility] ([facility])    [address] [place] [address] [phone]            Aug 09, 2017  1:00 PM CDT   NUTRITION VISIT with CRISTINA Morales [facility] ([facility])    [address] [place] [address] [phone]            Aug 16, 2017  1:00 PM CDT   NUTRITION VISIT with CRISTINA Morales [facility] ([facility])    [address] [place] [address] [phone]            Aug 23, 2017  1:00 PM CDT   NUTRITION VISIT with CRISTINA Morales [facility] ([facility])    [address] [place] [address] [phone]            Aug 23, 2017  2:40 PM CDT   (Arrive by 2:25 PM)   New Patient Visit with Ludy Betts MD   [facility] ([facility])    [address] [place] [address] [phone]            Aug 30, 2017   1:00 PM CDT   NUTRITION VISIT with CRISTINA Morales [facility] ([facility] [place])    [address] [phone]            Sep 06, 2017  1:00 PM CDT   NUTRITION VISIT with CRISTINA Morales [facility] ([facility] [place])    [address] [phone]            Sep 13, 2017  1:00 PM CDT   NUTRITION VISIT with CRISTINA Morales [facility] ([facility])    [address] [phone]            Sep 20, 2017  1:00 PM CDT   NUTRITION VISIT with CRISTINA Morales [facility] ([facility])    [address] [phone] [phone]              Who to contact     Please call your clinic at [phone] to:    Ask questions about your health    Make or cancel appointments    Discuss your medicines    Learn about your test results    Speak to your doctor   If you have compliments or concerns about an experience at your clinic, or if you wish to file a complaint, please contact [facility] Patient Relations at [phone] or email us at [email or web address]         Additional Information About Your Visit        GameriusharSpartan Race Information     TheWrap gives you secure access to your electronic health record. If you see a primary care provider, you can also send messages to your care team and make appointments. If you have questions, please call your primary care clinic.  If you do not have a primary care provider, please call [phone] and they will assist you.      TheWrap is an electronic gateway that provides easy, online access to your medical records. With TheWrap, you can request a clinic appointment, read your test results,  "renew a prescription or communicate with your care team.     To access your existing account, please contact your [facility] or call [phone] for assistance.        Care EveryWhere ID     This is your Care EveryWhere ID. This could be used by other organizations to access your [place] medical records  [id number]        Your Vitals Were     Height BMI (Body Mass Index)                1.626 m (5' 4.02\") 20.47 kg/m2           Blood Pressure from Last 3 Encounters:   02/02/17 138/87    Weight from Last 3 Encounters:   07/19/17 54.1 kg (119 lb 4.8 oz)   06/23/17 53.2 kg (117 lb 4.8 oz)   05/03/17 54.5 kg (120 lb 3.2 oz)              We Performed the Following     MNT INDIVIDUAL F/U REASSESS, EA 15 MIN        Primary Care Provider Office Phone # Fax #    Majo Abena Doherty [phone]       PRIMARY CARE CENTER [address] [place] [address]        Equal Access to Services     JACKELYN [facility]: Hadii aad ku hadasho Soomaali, waaxda luqadaha, qaybta kaalmada adeegyada, waxay idiin haybrandynn desi moreno . So [facility] [phone].    ATENCIÓN: Si habla español, tiene a briscoe disposición servicios gratuitos de asistencia lingüística. Llame al [phone].    We comply with applicable federal civil rights laws and [place] laws. We do not discriminate on the basis of race, color, national origin, age, disability sex, sexual orientation or gender identity.            Thank you!     Thank you for choosing [facility]  for your care. Our goal is always to provide you with excellent care. Hearing back from our patients is one way we can continue to improve our services. Please take a few minutes to complete the written survey that you may receive in the mail after your visit with us. Thank you!             Your Updated Medication List - Protect others around you: Learn how to safely use, store and throw away your medicines at " www.disposemymeds.org.      Notice  As of 7/19/2017 11:59 PM    You have not been prescribed any medications.

## 2017-07-19 NOTE — PROGRESS NOTES
Maria De Jesus Hess  is a 31 year old female presents today for follow-up nutrition consultation.  Maria De Jesus seemed to have a better week, continues to see her therapist.  She does not see a point to why she needs to see an MD.  However, she has an appointment with MD at the end of August.   She still believes that weighing self 2 times per day is not a problem though she is not willing to give that up.        Vitals:  Wt Readings from Last 4 Encounters:   07/19/17 54.1 kg (119 lb 4.8 oz)   06/23/17 53.2 kg (117 lb 4.8 oz)   05/03/17 54.5 kg (120 lb 3.2 oz)   02/23/17 54.9 kg (121 lb 1.6 oz)       Nutrition History  Patient is on a regular  diet at home.  Recall:  7:00:  1c of Fairlife milk with 1 sc of BiPro, banana 75% of the time.  14:00:  Starbucks Caramel Machiato  17:00:  Salad with Chicken and black beans  19:00:  2 tbsp potato starch, greek yogurt, PB      Physical Activity  55 miles per week.      Time with Patient:  30 Minutes    Nutrition  DX:.   No diagnosis found.  She has severe eating disorder.  Needs higher level care, but even being able to come to nutrition and therapy appointments is a struggle. Encouraged that she scheduled MD appointment.     Nutrition Goals:   1:   Have every morning breakfast parfait in an appropriate portions.       Concern about her health and well being.  She needs higher level care.  Expressed my concerns.      Mary Song, MS, RD, CSSD, LD  [facility]

## 2017-07-26 ENCOUNTER — ALLIED HEALTH/NURSE VISIT (OUTPATIENT)
Dept: INTERNAL MEDICINE | Facility: CLINIC | Age: 32
End: 2017-07-26

## 2017-07-26 DIAGNOSIS — E46 UNSPECIFIED PROTEIN-CALORIE MALNUTRITION (H): ICD-10-CM

## 2017-07-26 DIAGNOSIS — R63.0 ANOREXIA: ICD-10-CM

## 2017-07-26 DIAGNOSIS — Z71.3 DIETARY COUNSELING: Primary | ICD-10-CM

## 2017-07-26 NOTE — PROGRESS NOTES
Maria De Jesus Hess  is a 31 year old female presents today for follow-up nutrition consultation.  Maria De Jesus has been frustrated about her progress in recovery.  Possibly that she is realizing that her eating disorder is stronger than she was thinking.  It is a progress that she is even thinking that she has eating disorder and even acknowledging.    Vitals:  Wt Readings from Last 4 Encounters:   07/19/17 54.1 kg (119 lb 4.8 oz)   06/23/17 53.2 kg (117 lb 4.8 oz)   05/03/17 54.5 kg (120 lb 3.2 oz)   02/23/17 54.9 kg (121 lb 1.6 oz)       Nutrition History  Patient is on a regular  diet at home.  Recall:  7:00:  Only one day where she was able to have 2sl of bread, greek yogurt and banana.  That day she ran 14 miles and thus it was easier to have it.   14:00:  Starbucks Caramel Machiato  20:00:  Beet salad with lettuce and beets  21:00:  Picky bar      Physical Activity  55 miles per week.      Time with Patient:  30 Minutes    Nutrition  DX:.   1. Dietary counseling    2. Anorexia    3. Unspecified protein-calorie malnutrition (H)      She has severe eating disorder.  Needs higher level care, but even being able to come to nutrition and therapy appointments is a struggle. Encouraged that she scheduled MD appointment.     Nutrition Goals:   1:   Have every morning to have 2 sl of bread, greek yogurt, fruit or 2sl of bread, 8oz of Fairlife and BiPro.  Discussed importance of thinking about this breakfast as part of her prescription for training.       Concern about her health and well being.  She needs higher level care.  Expressed my concerns, but she will not be leaving her job for higher level care. Discussed that treatment is uncomfortable and will be uncomfortable.  When it is hardest to do the treatment it is the most important to come to it.        Mary Song, MS, RD, CSSD, LD   HEALTH

## 2017-07-26 NOTE — MR AVS SNAPSHOT
After Visit Summary   7/26/2017    Maria De Jesus Hess    MRN: [id number]           Patient Information     Date Of Birth          1985        Visit Information        Provider Department      7/26/2017 1:00 PM Mary Song RD [facility]        Today's Diagnoses     Dietary counseling    -  1    Anorexia        Unspecified protein-calorie malnutrition (H)           Follow-ups after your visit        Your next 10 appointments already scheduled     Aug 02, 2017  1:00 PM CDT   NUTRITION VISIT with CRISTINA Morales [facility] ([facility])    [address] [place] [address] 79913-41720 [phone]            Aug 09, 2017  1:00 PM CDT   NUTRITION VISIT with CRISTINA Morales [facility] ([facility])    [address] [place] [address] [id number] [phone]            Aug 16, 2017  1:00 PM CDT   NUTRITION VISIT with CRISTINA Morales [facility] ([facility])    [address] [place] [phone]            Aug 23, 2017  1:00 PM CDT   NUTRITION VISIT with CRISTINA Morales [facility] ([facility])    [address] [place] [phone] [phone]            Aug 23, 2017  2:40 PM CDT   (Arrive by 2:25 PM)   New Patient Visit with Ludy Betts MD   [facility] ([facility])    [address] [place] [address]   [phone]            Aug 30, 2017  1:00 PM CDT   NUTRITION VISIT with CRISTINA Morales [facility] ([facility])    [address] [place] [phone] [phone]            Sep 06, 2017  1:00 PM CDT   NUTRITION  VISIT with CRISTINA Morales [facility] ([facility])    [address] [phone]            Sep 13, 2017  1:00 PM CDT   NUTRITION VISIT with CRISTINA Morales [facility] ([facility])    [address] [phone] [phone]            Sep 20, 2017  1:00 PM CDT   NUTRITION VISIT with CRISTINA Morales [facility] ([facility])    [address] [phone] [phone]            Sep 27, 2017  1:00 PM CDT   NUTRITION VISIT with CRISTINA Morales [employer] [facility] ([facility])    [address] [address]-4800 [phone]              Who to contact     Please call your clinic at [phone] to:    Ask questions about your health    Make or cancel appointments    Discuss your medicines    Learn about your test results    Speak to your doctor   If you have compliments or concerns about an experience at your clinic, or if you wish to file a complaint, please contact [facility] Patient Relations at [phone] or email us at [email or web address]         Additional Information About Your Visit        MyChart Information     swabrt gives you secure access to your electronic health record. If you see a primary care provider, you can also send messages to your care team and make appointments. If you have questions, please call your primary care clinic.  If you do not have a primary care provider, please call [phone] and they will assist you.      VoloMedia is an electronic gateway that provides easy, online access to your medical records. With VoloMedia, you can request a clinic appointment, read your test results, renew a prescription or  communicate with your care team.     To access your existing account, please contact your [facility] or call [phone] for assistance.        Care EveryWhere ID     This is your Care EveryWhere ID. This could be used by other organizations to access your [place] medical records  [id number]         Blood Pressure from Last 3 Encounters:   02/02/17 138/87    Weight from Last 3 Encounters:   07/19/17 54.1 kg (119 lb 4.8 oz)   06/23/17 53.2 kg (117 lb 4.8 oz)   05/03/17 54.5 kg (120 lb 3.2 oz)              We Performed the Following     MNT INDIVIDUAL F/U REASSESS, EA 15 MIN        Primary Care Provider Office Phone # Fax #    Majo Doherty [phone]       PRIMARY CARE CENTER [address] [place] [address]        Equal Access to Services     JACKELYN [facility] : Hadii aad ku hadasho Soomaali, waaxda luqadaha, qaybta kaalmada adeegyada, waxay luis ein hayaan adetae moreno . So [facility] [phone].    ATENCIÓN: Si habla español, tiene a briscoe disposición servicios gratuitos de asistencia lingüística. Llame al [phone].    We comply with applicable federal civil rights laws and [place] laws. We do not discriminate on the basis of race, color, national origin, age, disability sex, sexual orientation or gender identity.            Thank you!     Thank you for choosing [facility]  for your care. Our goal is always to provide you with excellent care. Hearing back from our patients is one way we can continue to improve our services. Please take a few minutes to complete the written survey that you may receive in the mail after your visit with us. Thank you!             Your Updated Medication List - Protect others around you: Learn how to safely use, store and throw away your medicines at www.disposemymeds.org.      Notice  As of 7/26/2017  3:49 PM    You have not been prescribed any medications.

## 2017-08-02 ENCOUNTER — ALLIED HEALTH/NURSE VISIT (OUTPATIENT)
Dept: INTERNAL MEDICINE | Facility: CLINIC | Age: 32
End: 2017-08-02

## 2017-08-02 VITALS — WEIGHT: 118.6 LBS | BODY MASS INDEX: 20.25 KG/M2 | HEIGHT: 64 IN

## 2017-08-02 DIAGNOSIS — Z71.3 DIETARY COUNSELING: Primary | ICD-10-CM

## 2017-08-02 DIAGNOSIS — F50.019 ANOREXIA NERVOSA, RESTRICTING TYPE: ICD-10-CM

## 2017-08-02 DIAGNOSIS — E46 UNSPECIFIED PROTEIN-CALORIE MALNUTRITION (H): ICD-10-CM

## 2017-08-02 NOTE — MR AVS SNAPSHOT
After Visit Summary   8/2/2017    Maria De Jesus Hess    MRN: [id number]           Patient Information     Date Of Birth          1985        Visit Information        Provider Department      8/2/2017 1:00 PM Mary Song RD [facility]        Today's Diagnoses     Dietary counseling    -  1    Unspecified protein-calorie malnutrition (H)        Anorexia nervosa, restricting type           Follow-ups after your visit        Your next 10 appointments already scheduled     Aug 09, 2017  1:00 PM CDT   NUTRITION VISIT with CRISTINA Morales [facility] ([facility])    [address] [place] [address] [id number] [phone]            Aug 16, 2017  1:00 PM CDT   NUTRITION VISIT with CRISTINA Morales [facility] ([facility])    [address] [place] [phone]            Aug 23, 2017  1:00 PM CDT   NUTRITION VISIT with CRISTINA Morales [facility] ([facility])    [address] [place] [address] [phone]            Aug 23, 2017  2:40 PM CDT   (Arrive by 2:25 PM)   New Patient Visit with Ludy Betts MD   [facility] ([facility])    [address] [place] [address]   [phone]            Aug 30, 2017  1:00 PM CDT   NUTRITION VISIT with CRISTINA Morales [facility] ([facility])    [address] [place] [address] [phone]            Sep 06, 2017  1:00 PM CDT   NUTRITION VISIT with CRISTINA Morales [facility] ([facility])    [address] [place] [phone] [phone]            Sep 13, 2017  1:00  PM CDT   NUTRITION VISIT with CRISTINA Morales [facility] ([facility] [place])    [address] [facility]  [address] [facility] 10308-[address] [phone]            Sep 20, 2017  1:00 PM CDT   NUTRITION VISIT with CRISTINA Morales [facility] ([facility])    [address] [facility]  [address] [address]79-[address] [phone]            Sep 27, 2017  1:00 PM CDT   NUTRITION VISIT with CRISTINA Morales [facility] ([facility])    [address] [facility]  [address] [facility] 69150-[address] [phone]              Who to contact     Please call your clinic at [phone] to:    Ask questions about your health    Make or cancel appointments    Discuss your medicines    Learn about your test results    Speak to your doctor   If you have compliments or concerns about an experience at your clinic, or if you wish to file a complaint, please contact [facility] Patient Relations at [phone] or email us at [email or web address]         Additional Information About Your Visit        Secret RecipeharKopi Information     Sharely.Us gives you secure access to your electronic health record. If you see a primary care provider, you can also send messages to your care team and make appointments. If you have questions, please call your primary care clinic.  If you do not have a primary care provider, please call [phone] and they will assist you.      Sharely.Us is an electronic gateway that provides easy, online access to your medical records. With [email or web address].Us, you can request a clinic appointment, read your test results, renew a prescription or communicate with your care team.     To access your existing account, please contact your [facility] or call [phone] for assistance.        Care EveryWhere ID     This is your  "Care EveryWhere ID. This could be used by other organizations to access your [place] medical records  [id number]        Your Vitals Were     Height BMI (Body Mass Index)                1.626 m (5' 4.02\") 20.35 kg/m2           Blood Pressure from Last 3 Encounters:   02/02/17 138/87    Weight from Last 3 Encounters:   08/02/17 53.8 kg (118 lb 9.6 oz)   07/19/17 54.1 kg (119 lb 4.8 oz)   06/23/17 53.2 kg (117 lb 4.8 oz)              We Performed the Following     MNT INDIVIDUAL F/U REASSESS, EA 15 MIN        Primary Care Provider Office Phone # Fax #    Majo Doherty [phone]       PRIMARY CARE CENTER [address] [place] [address]        Equal Access to Services     CESAR SHI : Hadii lalo garcia hadasho Soomaali, waaxda luqadaha, qaybta kaalmada adeegyada, delgado [place] hayjolynn moreno . So [facility] [phone].    ATENCIÓN: Si habla español, tiene a briscoe disposición servicios gratuitos de asistencia lingüística. Llame al [phone].    We comply with applicable federal civil rights laws and [place] laws. We do not discriminate on the basis of race, color, national origin, age, disability sex, sexual orientation or gender identity.            Thank you!     Thank you for choosing [facility]  for your care. Our goal is always to provide you with excellent care. Hearing back from our patients is one way we can continue to improve our services. Please take a few minutes to complete the written survey that you may receive in the mail after your visit with us. Thank you!             Your Updated Medication List - Protect others around you: Learn how to safely use, store and throw away your medicines at www.disposemymeds.org.      Notice  As of 8/2/2017  4:13 PM    You have not been prescribed any medications.      "

## 2017-08-02 NOTE — PROGRESS NOTES
Maria De Jesus Hess  is a 31 year old female presents today for follow-up nutrition consultation.  Maria De Jesus has been frustrated about her progress in recovery.  She is really frustrated and is scared that she is binging.     Vitals:  Wt Readings from Last 4 Encounters:   08/02/17 53.8 kg (118 lb 9.6 oz)   07/19/17 54.1 kg (119 lb 4.8 oz)   06/23/17 53.2 kg (117 lb 4.8 oz)   05/03/17 54.5 kg (120 lb 3.2 oz)       Nutrition History  Patient is on a regular  diet at home.  Recall:  7:00:  none    14:00:  2 string cheeses  20:00:  Spinach salad with salsa, 1/2can of tuna, 1/2 avocado, snap peas  21:00:  Greek yogurt, potato starch, 1/2 banana      Physical Activity  55 miles per week.      Time with Patient:  30 Minutes    Nutrition  DX:.   1. Dietary counseling    2. Unspecified protein-calorie malnutrition (H)    3. Anorexia nervosa, restricting type      She has severe eating disorder.  Needs higher level care, but even being able to come to nutrition and therapy appointments is a struggle.     Nutrition Goals:   1:   Have every morning to have 8oz of fairlife with BiPro, 1 banana.  Discussed importance of thinking about this breakfast as part of her prescription for training.       Concern about her health and well being.  She needs higher level care.  Expressed my concerns, but she will not be leaving her job for higher level care. Discussed that treatment is uncomfortable and will be uncomfortable.  When it is hardest to do the treatment it is the most important to come to it.        Mary Song, MS, RD, CSSD, LD  [facility]

## 2017-08-09 ENCOUNTER — ALLIED HEALTH/NURSE VISIT (OUTPATIENT)
Dept: INTERNAL MEDICINE | Facility: CLINIC | Age: 32
End: 2017-08-09

## 2017-08-09 VITALS — BODY MASS INDEX: 20.04 KG/M2 | WEIGHT: 117.4 LBS | HEIGHT: 64 IN

## 2017-08-09 DIAGNOSIS — F50.019 ANOREXIA NERVOSA, RESTRICTING TYPE: ICD-10-CM

## 2017-08-09 DIAGNOSIS — Z71.3 DIETARY COUNSELING: Primary | ICD-10-CM

## 2017-08-09 NOTE — PROGRESS NOTES
Maria De Jesus Hess  is a 31 year old female presents today for follow-up nutrition consultation.  Maria De Jesus has been frustrated about her progress in recovery.  She is really frustrated and is scared that she is binging.   She was able to talk about having more support (make additional appointments with RD and therapist)    Vitals:  Wt Readings from Last 4 Encounters:   08/09/17 117 lb 6.4 oz (53.3 kg)   08/02/17 118 lb 9.6 oz (53.8 kg)   07/19/17 119 lb 4.8 oz (54.1 kg)   06/23/17 117 lb 4.8 oz (53.2 kg)       Nutrition History  Patient is on a regular  diet at home.  Recall:  7:00:  8oz of Fair life milk,   1 scoop of BiPro, Banana (5 days in the last week)  14:00:  2 string cheeses  18:00:  Spinach salad with salsa, 1/2can of tuna, 1/2 avocado, snap peas  21:00:  Greek yogurt, potato starch, 1/2 banana      Physical Activity  55 miles per week.      Time with Patient:  60 Minutes    Nutrition  DX:.   1. Dietary counseling    2. Anorexia nervosa, restricting type      She has severe eating disorder.  Needs higher level care, but even being able to come to nutrition and therapy appointments is a struggle.  Today she was able to verbalize the need for more support.      Nutrition Goals:   1:   Have every morning to have 8oz of fairlife with BiPro, 1 banana, 1/4c of oats, 1T of PB for breakfast smoothie .  Discussed importance of thinking about this breakfast as part of her prescription for training.  2:  Made 2 ideas for dinners that have all food groups, but still not in adequate quantities.       Concern about her health and well being.  She needs higher level care.  Expressed my concerns, but she will not be leaving her job for higher level care. Discussed that treatment is uncomfortable and will be uncomfortable.  When it is hardest to do the treatment it is the most important to come to it.        Mary Song, MS, RD, CSSD, LD  [facility]

## 2017-08-09 NOTE — MR AVS SNAPSHOT
After Visit Summary   8/9/2017    Maria De Jesus Hess    MRN: [id number]           Patient Information     Date Of Birth          1985        Visit Information        Provider Department      8/9/2017 1:00 PM Mary Song RD [facility]        Today's Diagnoses     Dietary counseling    -  1    Anorexia nervosa, restricting type           Follow-ups after your visit        Your next 10 appointments already scheduled     Aug 16, 2017  1:00 PM CDT   NUTRITION VISIT with CRISTINA Morales [facility] ([facility])    [address] [place] [id number] [phone]            Aug 23, 2017  1:00 PM CDT   NUTRITION VISIT with CRISTINA Morales [facility] ([facility])    [address] [place] [id number] [phone]            Aug 23, 2017  2:40 PM CDT   (Arrive by 2:25 PM)   New Patient Visit with Ludy Betts MD   [facility] ([facility])    [address] [place] [address]   [phone]            Aug 30, 2017  1:00 PM CDT   NUTRITION VISIT with CRISTINA Morales [facility] ([facility])    [address] [place] [id number] [phone]            Sep 06, 2017  1:00 PM CDT   NUTRITION VISIT with CRISTINA Morales [facility] ([facility])    [address] [place] [phone]            Sep 13, 2017  1:00 PM CDT   NUTRITION VISIT with CRISTINA Morales [facility] ([facility])    [address] [place] [id number] [phone]            Sep 20, 2017  1:00 PM CDT   NUTRITION VISIT with CRISTINA Morales  "[facility] ([facility])    [address] [facility]  [address] [facility] [address] [phone]            Sep 27, 2017  1:00 PM CDT   NUTRITION VISIT with Mary Song RD   [facility] ([facility])    [address] [id number] [phone]              Who to contact     Please call your clinic at [phone] to:    Ask questions about your health    Make or cancel appointments    Discuss your medicines    Learn about your test results    Speak to your doctor   If you have compliments or concerns about an experience at your clinic, or if you wish to file a complaint, please contact [facility] Patient Relations at [phone] or email us at [email or web address]         Additional Information About Your Visit        [employer] Information     E[employer] gives you secure access to your electronic health record. If you see a primary care provider, you can also send messages to your care team and make appointments. If you have questions, please call your primary care clinic.  If you do not have a primary care provider, please call [phone] and they will assist you.      ViewReple is an electronic gateway that provides easy, online access to your medical records. With View[employer]le, you can request a clinic appointment, read your test results, renew a prescription or communicate with your care team.     To access your existing account, please contact your [facility] or call [phone] for assistance.        Care EveryWhere ID     This is your Care EveryWhere ID. This could be used by other organizations to access your [place] medical records  [id number]        Your Vitals Were     Height BMI (Body Mass Index)                5' 4.02\" (162.6 cm) 20.14 kg/m2           Blood Pressure from Last 3 Encounters:   02/02/17 " 138/87    Weight from Last 3 Encounters:   08/09/17 117 lb 6.4 oz (53.3 kg)   08/02/17 118 lb 9.6 oz (53.8 kg)   07/19/17 119 lb 4.8 oz (54.1 kg)              We Performed the Following     MNT INDIVIDUAL F/U REASSESS, EA 15 MIN        Primary Care Provider Office Phone # Fax #    Majo Abena Doherty [phone]       [address] [facility] [address]        Equal Access to Services     [facility]: Hadii aad ku hadasho Soomaali, waaxda luqadaha, qaybta kaalmada adeegyada, waxfatmata moreno . So [facility] [phone].    ATENCIÓN: Si habla español, tiene a briscoe disposición servicios gratuitos de asistencia lingüística. Llame al [phone].    We comply with applicable federal civil rights laws and [place] laws. We do not discriminate on the basis of race, color, national origin, age, disability sex, sexual orientation or gender identity.            Thank you!     Thank you for choosing [facility]  for your care. Our goal is always to provide you with excellent care. Hearing back from our patients is one way we can continue to improve our services. Please take a few minutes to complete the written survey that you may receive in the mail after your visit with us. Thank you!             Your Updated Medication List - Protect others around you: Learn how to safely use, store and throw away your medicines at www.disposemymeds.org.      Notice  As of 8/9/2017  4:34 PM    You have not been prescribed any medications.

## 2017-08-16 ENCOUNTER — ALLIED HEALTH/NURSE VISIT (OUTPATIENT)
Dept: INTERNAL MEDICINE | Facility: CLINIC | Age: 32
End: 2017-08-16

## 2017-08-16 DIAGNOSIS — E46 UNSPECIFIED PROTEIN-CALORIE MALNUTRITION (H): ICD-10-CM

## 2017-08-16 DIAGNOSIS — F50.019 ANOREXIA NERVOSA, RESTRICTING TYPE: ICD-10-CM

## 2017-08-16 DIAGNOSIS — Z71.3 DIETARY COUNSELING: Primary | ICD-10-CM

## 2017-08-16 NOTE — PROGRESS NOTES
Maria De Jesus Hess  is a 31 year old female presents today for follow-up nutrition consultation. She was able to ask for extra support from her therapist, and scheduled appointments twice per week.     Vitals:  Wt Readings from Last 4 Encounters:   08/09/17 117 lb 6.4 oz (53.3 kg)   08/02/17 118 lb 9.6 oz (53.8 kg)   07/19/17 119 lb 4.8 oz (54.1 kg)   06/23/17 117 lb 4.8 oz (53.2 kg)       Nutrition History  Patient is on a regular  diet at home.  Recall:  7:00:  8oz of Fair life milk,   1 scoop of BiPro, Banana (3 days in the last week)  14:00:  2 string cheeses  18:00:  Spinach salad with salsa, 1/2can of tuna, 1/2 avocado, snap peas  21:00:  Greek yogurt, potato starch, 1/2 banana      Physical Activity  55 miles per week.      Time with Patient:  45 Minutes    Nutrition  DX:.   1. Dietary counseling    2. Anorexia nervosa, restricting type    3. Unspecified protein-calorie malnutrition (H)      She has severe eating disorder.  Needs higher level care, but even being able to come to nutrition and therapy appointments is a struggle.  Today she was able to verbalize the need for more support.      Nutrition Goals:   1:   Have every morning to have 8oz of fairlife with BiPro, 1 banana, 1/4c of oats, 1T of PB for breakfast smoothie .  Discussed importance of thinking about this breakfast as part of her prescription for training.        Concern about her health and well being.  She needs higher level care.  Expressed my concerns, but she will not be leaving her job for higher level care. Discussed that treatment is uncomfortable and will be uncomfortable.  When it is hardest to do the treatment it is the most important to come to it.        Mary Song, MS, RD, CSSD, LD  [facility]

## 2017-08-16 NOTE — MR AVS SNAPSHOT
After Visit Summary   8/16/2017    Maria De Jesus Hess    MRN: [id number]           Patient Information     Date Of Birth          1985        Visit Information        Provider Department      8/16/2017 1:00 PM Mary Song RD M [facility]        Today's Diagnoses     Dietary counseling    -  1    Anorexia nervosa, restricting type        Unspecified protein-calorie malnutrition (H)           Follow-ups after your visit        Your next 10 appointments already scheduled     Aug 23, 2017  1:00 PM CDT   NUTRITION VISIT with CRISTINA Morales [facility] ([facility])    [address] [place] [phone]            Aug 23, 2017  2:40 PM CDT   (Arrive by 2:25 PM)   New Patient Visit with Ludy Betts MD   [facility] ([facility])    [address] [place] [address]   [phone]            Aug 30, 2017  1:00 PM CDT   NUTRITION VISIT with CRISTINA Morales [facility] ([facility])    [address] [place] [phone]            Sep 06, 2017  1:00 PM CDT   NUTRITION VISIT with CRISTINA Morales [facility] ([facility])    [address] [place] [phone]            Sep 13, 2017  1:00 PM CDT   NUTRITION VISIT with CRISTINA Morales [facility] ([facility])    [address] [place] [phone] [phone]            Sep 20, 2017  1:00 PM CDT   NUTRITION VISIT with CRISTINA Morales [facility] ([facility])    [address] [place] [phone]            Sep 27, 2017   1:00 PM CDT   NUTRITION VISIT with Mary Song RD   [facility] ([facility])    [address] [facility]  [address] [facility] [address] [phone]              Who to contact     Please call your clinic at [phone] to:    Ask questions about your health    Make or cancel appointments    Discuss your medicines    Learn about your test results    Speak to your doctor   If you have compliments or concerns about an experience at your clinic, or if you wish to file a complaint, please contact [facility] Patient Relations at [phone] or email us at [email or web address]         Additional Information About Your Visit        Emme E2MSharLytro Information     [employer] gives you secure access to your electronic health record. If you see a primary care provider, you can also send messages to your care team and make appointments. If you have questions, please call your primary care clinic.  If you do not have a primary care provider, please call [phone] and they will assist you.      Algorego is an electronic gateway that provides easy, online access to your medical records. With [employer], you can request a clinic appointment, read your test results, renew a prescription or communicate with your care team.     To access your existing account, please contact your [facility] or call [phone] for assistance.        Care EveryWhere ID     This is your Care EveryWhere ID. This could be used by other organizations to access your [facility] medical records  [id number]         Blood Pressure from Last 3 Encounters:   02/02/17 138/87    Weight from Last 3 Encounters:   08/09/17 117 lb 6.4 oz (53.3 kg)   08/02/17 118 lb 9.6 oz (53.8 kg)   07/19/17 119 lb 4.8 oz (54.1 kg)              We Performed the Following     MNT INDIVIDUAL F/U REASSESS, EA 15 MIN        Primary Care Provider Office Phone # Fax #     Majo Doherty [phone]       [address] [facility] [address]        Equal Access to Services     CESAR SHI : Desi Bonilla, lexi tsang, paulzaria kaalmada dawoodcale, waxfatmata luis ein hayaaedouard seaytae teague josh dinh. So [facility] [phone].    ATENCIÓN: Si habla español, tiene a briscoe disposición servicios gratuitos de asistencia lingüística. Llame al [phone].    We comply with applicable federal civil rights laws and [place] laws. We do not discriminate on the basis of race, color, national origin, age, disability sex, sexual orientation or gender identity.            Thank you!     Thank you for choosing [facility]  for your care. Our goal is always to provide you with excellent care. Hearing back from our patients is one way we can continue to improve our services. Please take a few minutes to complete the written survey that you may receive in the mail after your visit with us. Thank you!             Your Updated Medication List - Protect others around you: Learn how to safely use, store and throw away your medicines at www.disposemymeds.org.      Notice  As of 8/16/2017 11:59 PM    You have not been prescribed any medications.

## 2017-08-23 ENCOUNTER — ALLIED HEALTH/NURSE VISIT (OUTPATIENT)
Dept: INTERNAL MEDICINE | Facility: CLINIC | Age: 32
End: 2017-08-23

## 2017-08-23 ENCOUNTER — OFFICE VISIT (OUTPATIENT)
Dept: ORTHOPEDICS | Facility: CLINIC | Age: 32
End: 2017-08-23

## 2017-08-23 VITALS
WEIGHT: 119.3 LBS | DIASTOLIC BLOOD PRESSURE: 80 MMHG | SYSTOLIC BLOOD PRESSURE: 116 MMHG | HEART RATE: 62 BPM | BODY MASS INDEX: 20.37 KG/M2 | HEIGHT: 64 IN

## 2017-08-23 VITALS — WEIGHT: 119.2 LBS | BODY MASS INDEX: 20.35 KG/M2 | HEIGHT: 64 IN

## 2017-08-23 DIAGNOSIS — F50.00 ANOREXIA NERVOSA (H): Primary | ICD-10-CM

## 2017-08-23 DIAGNOSIS — Z71.3 DIETARY COUNSELING: Primary | ICD-10-CM

## 2017-08-23 DIAGNOSIS — F50.00 ANOREXIA NERVOSA (H): ICD-10-CM

## 2017-08-23 DIAGNOSIS — F50.019 ANOREXIA NERVOSA, RESTRICTING TYPE: ICD-10-CM

## 2017-08-23 LAB
ALBUMIN SERPL-MCNC: 4.4 G/DL (ref 3.4–5)
ALP SERPL-CCNC: 64 U/L (ref 40–150)
ALT SERPL W P-5'-P-CCNC: 50 U/L (ref 0–50)
ANION GAP SERPL CALCULATED.3IONS-SCNC: 9 MMOL/L (ref 3–14)
AST SERPL W P-5'-P-CCNC: 43 U/L (ref 0–45)
BASOPHILS # BLD AUTO: 0 10E9/L (ref 0–0.2)
BASOPHILS NFR BLD AUTO: 0.6 %
BILIRUB DIRECT SERPL-MCNC: 0.3 MG/DL (ref 0–0.2)
BILIRUB SERPL-MCNC: 1.2 MG/DL (ref 0.2–1.3)
BUN SERPL-MCNC: 15 MG/DL (ref 7–30)
CALCIUM SERPL-MCNC: 9.4 MG/DL (ref 8.5–10.1)
CHLORIDE SERPL-SCNC: 99 MMOL/L (ref 94–109)
CO2 SERPL-SCNC: 25 MMOL/L (ref 20–32)
CREAT SERPL-MCNC: 0.71 MG/DL (ref 0.52–1.04)
DIFFERENTIAL METHOD BLD: NORMAL
EOSINOPHIL # BLD AUTO: 0 10E9/L (ref 0–0.7)
EOSINOPHIL NFR BLD AUTO: 0.2 %
ERYTHROCYTE [DISTWIDTH] IN BLOOD BY AUTOMATED COUNT: 12 % (ref 10–15)
ERYTHROCYTE [SEDIMENTATION RATE] IN BLOOD BY WESTERGREN METHOD: 7 MM/H (ref 0–20)
ESTRADIOL SERPL-MCNC: 229 PG/ML
FERRITIN SERPL-MCNC: 74 NG/ML (ref 12–150)
FSH SERPL-ACNC: 2.6 IU/L
GFR SERPL CREATININE-BSD FRML MDRD: >90 ML/MIN/1.7M2
GLUCOSE SERPL-MCNC: 71 MG/DL (ref 70–99)
HCT VFR BLD AUTO: 43.8 % (ref 35–47)
HGB BLD-MCNC: 14.8 G/DL (ref 11.7–15.7)
IMM GRANULOCYTES # BLD: 0 10E9/L (ref 0–0.4)
IMM GRANULOCYTES NFR BLD: 0.3 %
LH SERPL-ACNC: 3 IU/L
LYMPHOCYTES # BLD AUTO: 1.4 10E9/L (ref 0.8–5.3)
LYMPHOCYTES NFR BLD AUTO: 22.4 %
MCH RBC QN AUTO: 32.9 PG (ref 26.5–33)
MCHC RBC AUTO-ENTMCNC: 33.8 G/DL (ref 31.5–36.5)
MCV RBC AUTO: 97 FL (ref 78–100)
MONOCYTES # BLD AUTO: 0.5 10E9/L (ref 0–1.3)
MONOCYTES NFR BLD AUTO: 7.3 %
NEUTROPHILS # BLD AUTO: 4.4 10E9/L (ref 1.6–8.3)
NEUTROPHILS NFR BLD AUTO: 69.2 %
NRBC # BLD AUTO: 0 10*3/UL
NRBC BLD AUTO-RTO: 0 /100
PLATELET # BLD AUTO: 225 10E9/L (ref 150–450)
POTASSIUM SERPL-SCNC: 3.8 MMOL/L (ref 3.4–5.3)
PROT SERPL-MCNC: 8 G/DL (ref 6.8–8.8)
PTH-INTACT SERPL-MCNC: 43 PG/ML (ref 12–72)
RBC # BLD AUTO: 4.5 10E12/L (ref 3.8–5.2)
SODIUM SERPL-SCNC: 133 MMOL/L (ref 133–144)
T3FREE SERPL-MCNC: 2.2 PG/ML (ref 2.3–4.2)
TSH SERPL DL<=0.005 MIU/L-ACNC: 0.56 MU/L (ref 0.4–4)
WBC # BLD AUTO: 6.4 10E9/L (ref 4–11)

## 2017-08-23 NOTE — MR AVS SNAPSHOT
After Visit Summary   8/23/2017    Maria De Jesus Hess    MRN: [id number]           Patient Information     Date Of Birth          1985        Visit Information        Provider Department      8/23/2017 1:00 PM Mary Song RD [facility]        Today's Diagnoses     Dietary counseling    -  1    Anorexia nervosa, restricting type           Follow-ups after your visit        Your next 10 appointments already scheduled     Aug 23, 2017  2:40 PM CDT   (Arrive by 2:25 PM)   New Patient Visit with Ludy Betts MD   [facility] ([facility])    [address] [place] [phone]   [phone]            Aug 30, 2017  1:00 PM CDT   NUTRITION VISIT with CRISTINA Morales [facility] ([facility])    [address] [place] [phone]            Sep 06, 2017  1:00 PM CDT   NUTRITION VISIT with CRISTINA Morales [facility] ([facility])    [address] [place] [id number] [phone]            Sep 13, 2017  1:00 PM CDT   NUTRITION VISIT with CRISTINA Morales [facility] ([facility])    [address] [place] [phone]            Sep 20, 2017  1:00 PM CDT   NUTRITION VISIT with CRISTINA Morales [facility] ([facility])    [address] [place] [phone]            Sep 27, 2017  1:00 PM CDT   NUTRITION VISIT with CRISTINA Morales [facility] ([facility])    [address] [place] [phone]            Oct 04, 2017  1:30 PM CDT   NUTRITION VISIT with CRISTINA Morales  [facility] ([facility])    [address] [phone]            Oct 11, 2017  1:00 PM CDT   NUTRITION VISIT with CRISTINA Morales [facility] ([facility])    [address] [place] [phone]            Oct 18, 2017  1:00 PM CDT   NUTRITION VISIT with CRISTINA Morales [facility] ([facility])    [address] [place] [phone]            Oct 25, 2017  1:00 PM CDT   NUTRITION VISIT with CRISTINA Morales [facility] ([facility])    [address] [place] [address] [phone] [phone]              Who to contact     Please call your clinic at [phone] to:    Ask questions about your health    Make or cancel appointments    Discuss your medicines    Learn about your test results    Speak to your doctor   If you have compliments or concerns about an experience at your clinic, or if you wish to file a complaint, please contact [facility] Patient Relations at [phone] or email us at [email or web address]         Additional Information About Your Visit        MyChart Information     On[employer]ag[employer] gives you secure access to your electronic health record. If you see a primary care provider, you can also send messages to your care team and make appointments. If you have questions, please call your primary care clinic.  If you do not have a primary care provider, please call [phone] and they will assist you.      [employer] is an electronic gateway that provides easy, online access to your medical records. With [employer], you can request a clinic appointment, read your test results, renew a prescription or communicate with your care  "team.     To access your existing account, please contact your [facility] or call [phone] for assistance.        Care EveryWhere ID     This is your Care EveryWhere ID. This could be used by other organizations to access your [place] medical records  [id number]        Your Vitals Were     Height BMI (Body Mass Index)                5' 4.02\" (162.6 cm) 20.45 kg/m2           Blood Pressure from Last 3 Encounters:   02/02/17 138/87    Weight from Last 3 Encounters:   08/23/17 119 lb 3.2 oz (54.1 kg)   08/09/17 117 lb 6.4 oz (53.3 kg)   08/02/17 118 lb 9.6 oz (53.8 kg)              We Performed the Following     MNT INDIVIDUAL F/U REASSESS, EA 15 MIN        Primary Care Provider Office Phone # Fax #    Majo Doherty [phone]       [address] [facility] [address]        Equal Access to Services     CESAR SHI : Hadii aad ku hadasho Soomaali, waaxda luqadaha, qaybta kaalmada adeegyada, waxay idiin hayaan adeeg carlylearafiliberto la'jolynn . So [facility] [phone].    ATENCIÓN: Si habla español, tiene a briscoe disposición servicios gratuitos de asistencia lingüística. Llame al [phone].    We comply with applicable federal civil rights laws and [place] laws. We do not discriminate on the basis of race, color, national origin, age, disability sex, sexual orientation or gender identity.            Thank you!     Thank you for choosing [facility]  for your care. Our goal is always to provide you with excellent care. Hearing back from our patients is one way we can continue to improve our services. Please take a few minutes to complete the written survey that you may receive in the mail after your visit with us. Thank you!             Your Updated Medication List - Protect others around you: Learn how to safely use, store and throw away your medicines at www.disposemymeds.org.      Notice  As of 8/23/2017  2:07 PM    You have not " been prescribed any medications.

## 2017-08-23 NOTE — PROGRESS NOTES
Maria De Jesus Hess  is a 31 year old female presents today for follow-up nutrition consultation. She was able to ask for extra support from her therapist, and scheduled appointments twice per week.     Vitals:  Wt Readings from Last 4 Encounters:   08/23/17 119 lb 3.2 oz (54.1 kg)   08/09/17 117 lb 6.4 oz (53.3 kg)   08/02/17 118 lb 9.6 oz (53.8 kg)   07/19/17 119 lb 4.8 oz (54.1 kg)       Nutrition History  Patient is on a regular  diet at home.  Recall:  7:00:  8oz of Fair life milk,  1 scoop of BiPro, Banana  Or greek yogurt and banana.  She had breakfast every day compared to last week only 3 times.   14:00: Turkey sandwich with 2 sl of bread  21:00:  Greek yogurt, potato starch, 1/2 banana        Physical Activity  55 miles per week.      Time with Patient:  45 Minutes    Nutrition  DX:.   1. Dietary counseling    2. Anorexia nervosa, restricting type      She has severe eating disorder.  Needs higher level care, but even being able to come to nutrition and therapy appointments is a struggle.  Today she was able to verbalize the need for more frequent therapy and nutrition appointments.     Nutrition Goals:   1:   Have one morning to have 8oz of fairlife with BiPro, 1 banana, 1/4c of oats, 1T of PB for breakfast smoothie.  Discussed importance of thinking about this breakfast as part of her prescription for training.  2:  Have one of the 3 options that we created in the appointment for her lunch.      Concern about her health and well being.  She needs higher level care.  Expressed my concerns, but she will not be leaving her job for higher level care. Discussed that treatment is uncomfortable and will be uncomfortable.  When it is hardest to do the treatment it is the most important to come to it.    She is seeing an MD today as well.        Mary Song, MS, RD, CSSD, LD  [facility]

## 2017-08-23 NOTE — PROGRESS NOTES
" Subjective:   Maria De Jesus Hess is a 32 year old female who:    -Stress fractures:  2 stress reactions  Metatarsal and tibia 1 stress fracture pelvis ischium (?)  -No other fractures  -No fam hx of osteoporosis although MGM with kyphosis  - Height 5 ft 4 in no ht loss  -No tob  -Eating disorder for years likely since HS.  No previous treatment until seeing Mary in 2016.    -Basket ball was main sport starting in 9th grade  -Running in , serious about running in college  -Runs 50-60 miles per wk; typically does two marathons per year  -Seeing Psychiatry at  and dx'd with anorexia nervosa in May 2017.   -Calcium:  Greek yogurt 1 cup; almond milk daily, 1/2 cup per day of chocolate milk, +cheese string cheese  -Menarche;  7-8th or 8th grade  Menses:  Never amenorrheic, but tends to be irregular (35-36 days apart), super light; no OCPs or IUD.    -Doesn't eat red meat.   -Problems with fatigue  -She has been seeing Mary Song, Sports Dietician and a Psychologist for ED-NOS.  Mary has encouraged her over the last year to seek and medical evaluation and the patient is finally willing to follow thru with that recommendation.         PAST MEDICAL, SOCIAL, SURGICAL AND FAMILY HISTORY:     PMH:  Anorexia Nervosa    She has no h/o surgery    Neg Fam Hx of osteoporosis    She reports that she has never smoked. She does not have any smokeless tobacco history on file.      ALLERGIES: She is allergic to sulfa drugs.    CURRENT MEDICATIONS: None  Take Vit C, iron supplement 3-4 times per week.    REVIEW OF SYSTEMS: 12 point review of systems is negative except as noted above.     Exam:   /80 (BP Location: Right arm, Patient Position: Standing)  Pulse 62  Ht 5' 4\" (1.626 m)  Wt 119 lb 4.8 oz (54.1 kg)  BMI 20.48 kg/m2          CONSTITUTIONAL: healthy, alert and no distress  HEAD: Normocephalic. No masses, lesions, tenderness or abnormalities  SKIN: no suspicious lesions or rashes  GAIT:   NEUROLOGIC: " Non-focal  PSYCHIATRIC: affect normal/bright and mentation appears normal.    HEENT:  Neg  Neck: no lad and no thyromegaly  Lungs: CTA  Heart: rrr  Abd;  Soft and nttp  Ext:  No c/c/e and no stigmata of eating disorder.       TRIAD Risk Factors  Low EA withor without DE/ED: Meets DSM-V for ED  Low BMI: BMI > 18.5 or > 90% EW** or weight stable  Delayed Menarche: Menarche before 15 years  Oligomenorrhea and/or Amenorrhea: 6 - 9 menses in 12 months  Stress Reaction/Fracture: > 2, 1 or more with high risk or of trabecular bone sites  Specific Bone(s): Tibia, Pelvis, Metatarsal  TRIAD Score  Risk Score Status: Provisional  Cumulative Risk: 5 - Moderate Risk  Medical Plan: DXA and labs, continue treatment with  Psychiatry, Sports Dietician and Psychologist       Assessment/Plan:   Anorexia Nervosa  Multiple Bone Stress Injuries  Triad Risk Factor Score:  5 = High Risk    PLAN:   -DXA for BMD and body composition  -Labs for AN, oligo and multiple stress injuries  -Discussed of calcium and Vit D intake.  I have recommended the IOF Calcium Calculator.   -RTC to review the imaging and labs  -Continue to see  Psychiatry, Sports Dietician and Pyschologist      > 45 min of total time spent in one-on-one evalution and discussion with patient regarding nature of problem, course, prior treatments, and therapeutic options, at least 50% of which was spent in counseling and coordination of care:  Ludy Betts MD, CAQ, FACSM, CCD  [facility]  Sports Medicine and Bone Health  Team Physician;  Athletics

## 2017-08-23 NOTE — MR AVS SNAPSHOT
After Visit Summary   8/23/2017    Maria De Jesus Hess    MRN: [id number]           Patient Information     Date Of Birth          1985        Visit Information        Provider Department      8/23/2017 2:40 PM Ludy Betts MD [facility]        Today's Diagnoses     Anorexia nervosa    -  1       Follow-ups after your visit        Your next 10 appointments already scheduled     Aug 30, 2017  1:00 PM CDT   NUTRITION VISIT with [facility] ([facility] [place])    [address] [place] [id number] [phone]            Sep 06, 2017  1:00 PM CDT   NUTRITION VISIT with [facility]RISTINA [facility] ([facility])    [address] [place] [id number] [phone]            Sep 13, 2017  1:00 PM CDT   NUTRITION VISIT with [facility]RISTINA [facility] ([facility])    [address] [place] [id number] [phone]            Sep 20, 2017  1:00 PM CDT   NUTRITION VISIT with [facility]RISTINA [facility] ([facility])    [address] [place] [id number] [phone]            Sep 27, 2017  1:00 PM CDT   NUTRITION VISIT with CRISTINA [facility] ([facility])    [address] [place] [id number] [phone]            Oct 04, 2017  1:30 PM CDT   NUTRITION VISIT with [facility]RISTINA [facility] ([facility])    [address] [place] [phone] [phone]            Oct 11, 2017  1:00 PM CDT   NUTRITION VISIT with [facility]RISTINA [facility] ([facility]  [place])    [address]  [address] [facility] [address] [phone]            Oct 18, 2017  1:00 PM CDT   NUTRITION VISIT with Mary Song RD   [facility] ([facility])    [address] [place] [phone]            Oct 25, 2017  1:00 PM CDT   NUTRITION VISIT with Mary Song RD   [facility] ([facility])    [address] [place] [id number] [phone]              Future tests that were ordered for you today     Open Future Orders        Priority Expected Expires Ordered    EKG 12-lead complete with read (future) Routine  8/23/2018 8/23/2017    Erythrocyte sedimentation rate auto Routine 8/23/2017 9/22/2017 8/23/2017    Ferritin Routine 8/23/2017 9/22/2017 8/23/2017    Estradiol Routine 8/23/2017 9/22/2017 8/23/2017    Follicle stimulating hormone Routine 8/23/2017 9/22/2017 8/23/2017    Lutropin Routine 8/23/2017 9/22/2017 8/23/2017    Parathyroid Hormone Intact Routine 8/23/2017 8/23/2018 8/23/2017    T3 Free Routine 8/23/2017 9/22/2017 8/23/2017    TSH Routine 8/23/2017 9/22/2017 8/23/2017    Vitamin D Deficiency Routine 8/23/2017 9/22/2017 8/23/2017    CBC with platelets differential Routine 8/23/2017 9/22/2017 8/23/2017    Basic metabolic panel Routine 8/23/2017 9/22/2017 8/23/2017    Alkaline phosphatase Routine 8/23/2017 9/22/2017 8/23/2017    Hepatic panel Routine 8/23/2017 9/22/2017 8/23/2017    Dexa hip/pelvis/spine* Routine  8/23/2018 8/23/2017            Who to contact     Please call your clinic at [phone] to:    Ask questions about your health    Make or cancel appointments    Discuss your medicines    Learn about your test results    Speak to your doctor   If you have compliments or concerns about an experience at your clinic, or if you wish to file a complaint, please contact [facility]  Physicians Patient Relations at [phone] or email us at [email or web address]         Additional Information About Your Visit        [employer] Information     [employer] gives you secure access to your electronic health record. If you see a primary care provider, you can also send messages to your care team and make appointments. If you have questions, please call your primary care clinic.  If you do not have a primary care provider, please call [phone] and they will assist you.      [employer] is an electronic gateway that provides easy, online access to your medical records. With [employer], you can request a clinic appointment, read your test results, renew a prescription or communicate with your care team.     To access your existing account, please contact your [facility] or call [phone] for assistance.        Care EveryWhere ID     This is your Care EveryWhere ID. This could be used by other organizations to access your [place] medical records  [id number]         Blood Pressure from Last 3 Encounters:   02/02/17 138/87    Weight from Last 3 Encounters:   08/23/17 119 lb 3.2 oz (54.1 kg)   08/09/17 117 lb 6.4 oz (53.3 kg)   08/02/17 118 lb 9.6 oz (53.8 kg)              We Performed the Following     Testosterone Free and Total        Primary Care Provider Office Phone # Fax #    Majo Doherty [phone]        [facility] [address]        Equal Access to Services     CESAR SHI : Hadii lalo ku hadasho Soomaali, waaxda luqadaha, qaybta kaalmada karen, delgado moreno . So [facility] [phone].    ATENCIÓN: Si habla español, tiene a briscoe disposición servicios gratuitos de asistencia lingüística. Llame al [phone].    We comply with applicable federal civil rights laws and [place] laws. We do not discriminate on the basis of race, color, national origin, age, disability sex, sexual  orientation or gender identity.            Thank you!     Thank you for choosing [facility]  for your care. Our goal is always to provide you with excellent care. Hearing back from our patients is one way we can continue to improve our services. Please take a few minutes to complete the written survey that you may receive in the mail after your visit with us. Thank you!             Your Updated Medication List - Protect others around you: Learn how to safely use, store and throw away your medicines at www.disposemymeds.org.      Notice  As of 8/23/2017  3:42 PM    You have not been prescribed any medications.

## 2017-08-24 LAB
DEPRECATED CALCIDIOL+CALCIFEROL SERPL-MC: 39 UG/L (ref 20–75)
INTERPRETATION ECG - MUSE: NORMAL

## 2017-08-27 LAB
SHBG SERPL-SCNC: 201 NMOL/L (ref 30–135)
TESTOST FREE SERPL-MCNC: 0.15 NG/DL (ref 0.13–0.92)
TESTOST SERPL-MCNC: 35 NG/DL (ref 8–60)

## 2017-08-30 ENCOUNTER — ALLIED HEALTH/NURSE VISIT (OUTPATIENT)
Dept: INTERNAL MEDICINE | Facility: CLINIC | Age: 32
End: 2017-08-30

## 2017-08-30 VITALS — WEIGHT: 121.4 LBS | BODY MASS INDEX: 20.84 KG/M2

## 2017-08-30 DIAGNOSIS — F50.019 ANOREXIA NERVOSA, RESTRICTING TYPE: ICD-10-CM

## 2017-08-30 DIAGNOSIS — Z71.3 DIETARY COUNSELING: Primary | ICD-10-CM

## 2017-08-30 NOTE — MR AVS SNAPSHOT
After Visit Summary   8/30/2017    Maria De Jesus Hess    MRN: [id number]           Patient Information     Date Of Birth          1985        Visit Information        Provider Department      8/30/2017 1:00 PM Mary Song RD [facility]        Today's Diagnoses     Dietary counseling    -  1    Anorexia nervosa, restricting type           Follow-ups after your visit        Your next 10 appointments already scheduled     Sep 01, 2017  9:40 AM CDT   (Arrive by 9:25 AM)   Return Visit with Ludy Betts MD   [facility] ([facility])    [address] [place] [address]   [phone]            Sep 06, 2017  1:00 PM CDT   NUTRITION VISIT with CRISTINA Morales [facility] ([facility])    [address] [place] [phone]            Sep 13, 2017  1:30 PM CDT   NUTRITION VISIT with CRISTINA Morales [facility] ([facility])    [address] [place] [phone]            Sep 20, 2017  1:00 PM CDT   NUTRITION VISIT with CRISTINA Morales [facility] ([facility])    [address] [place] [phone]            Sep 27, 2017  1:00 PM CDT   NUTRITION VISIT with CRISTINA Morales [facility] ([facility])    [address] [place] [address] [phone]            Oct 04, 2017  1:30 PM CDT   NUTRITION VISIT with CRISTINA Morales [facility] ([facility])    [address] [place] [phone]            Oct 11, 2017  1:00 PM CDT   NUTRITION VISIT with CRISTINA Morales  [facility] ([facility])    [address]  [address] [facility] [phone]            Oct 18, 2017  1:00 PM CDT   NUTRITION VISIT with Mary Song RD   [facility] ([facility])    [address]  [address] [facility] [phone]            Oct 25, 2017  1:00 PM CDT   NUTRITION VISIT with Mary Song RD   [facility] ([facility])    [address]  [address] [facility] [phone]              Who to contact     Please call your clinic at [phone] to:    Ask questions about your health    Make or cancel appointments    Discuss your medicines    Learn about your test results    Speak to your doctor   If you have compliments or concerns about an experience at your clinic, or if you wish to file a complaint, please contact [facility] Physicians Patient Relations at [phone] or email us at [email or web address]         Additional Information About Your Visit        [employer] Information     [employer] gives you secure access to your electronic health record. If you see a primary care provider, you can also send messages to your care team and make appointments. If you have questions, please call your primary care clinic.  If you do not have a primary care provider, please call [phone] and they will assist you.      [employer] is an electronic gateway that provides easy, online access to your medical records. With [employer], you can request a clinic appointment, read your test results, renew a prescription or communicate with your care team.     To access your existing account, please contact your [facility] or call [phone] for assistance.        Care EveryWhere ID     This is your Care EveryWhere ID. This could be used by other  organizations to access your [place] medical records  [id number]        Your Vitals Were     BMI (Body Mass Index)                   20.84 kg/m2            Blood Pressure from Last 3 Encounters:   08/23/17 116/80   02/02/17 138/87    Weight from Last 3 Encounters:   08/30/17 55.1 kg (121 lb 6.4 oz)   08/23/17 54.1 kg (119 lb 4.8 oz)   08/23/17 54.1 kg (119 lb 3.2 oz)              We Performed the Following     MNT INDIVIDUAL F/U REASSESS, EA 15 MIN        Primary Care Provider Office Phone # Fax #    Majo Doherty [phone]       [address] [facility] [address]        Equal Access to Services     CESAR SHI : Hadii aad ku hadasho Sojamie, waaxda luqadaha, qaybta kaalmada adeegyada, delgado moreno . So [facility] [phone].    ATENCIÓN: Si habla español, tiene a briscoe disposición servicios gratuitos de asistencia lingüística. Llame al [phone].    We comply with applicable federal civil rights laws and [place] laws. We do not discriminate on the basis of race, color, national origin, age, disability sex, sexual orientation or gender identity.            Thank you!     Thank you for choosing [facility]  for your care. Our goal is always to provide you with excellent care. Hearing back from our patients is one way we can continue to improve our services. Please take a few minutes to complete the written survey that you may receive in the mail after your visit with us. Thank you!             Your Updated Medication List - Protect others around you: Learn how to safely use, store and throw away your medicines at www.disposemymeds.org.      Notice  As of 8/30/2017 11:59 PM    You have not been prescribed any medications.

## 2017-08-30 NOTE — PROGRESS NOTES
Maria De Jesus Hess  is a 31 year old female presents today for follow-up nutrition consultation. She was able to ask for extra support from her therapist, and scheduled appointments twice per week.     Vitals:  Wt Readings from Last 4 Encounters:   08/30/17 55.1 kg (121 lb 6.4 oz)   08/23/17 54.1 kg (119 lb 4.8 oz)   08/23/17 54.1 kg (119 lb 3.2 oz)   08/09/17 53.3 kg (117 lb 6.4 oz)       Nutrition History  Patient is on a regular  diet at home.  Recall:  7:00:  No breakfast  14:00: Banana, Greek yogurt (chobani)  17:45:  Salad, salsa, hummus, cheese, tortilla chips (handful)  Bedtime snack: Banana, pb, greek yogurt, potato starch    Physical Activity  55 miles per week.      Time with Patient:  60 Minutes    Nutrition  DX:.   1. Dietary counseling    2. Anorexia nervosa, restricting type      She has severe eating disorder.  Needs higher level care, but even being able to come to nutrition and therapy appointments is a struggle.  Today she was able to verbalize the need for more frequent therapy and nutrition appointments.     Nutrition Goals:   1:   Developed contract (therapist, dietitian, and MD appointments to be scheduled regularly, 3 meals per day, and no weight checking) to ensure completion of goals, or recommending higher level of care if unable to complete these goals.  2: Provided balanced breakfast options to choose from and eat every day.  3: Will re-start tracking food intake via moment diary for all meals.  4: Will only be weighed during these appointments.     Concern about her health and well being.  She needs higher level care.  Expressed my concerns, but she will not be leaving her job for higher level care. Discussed that treatment is uncomfortable and will be uncomfortable.  When it is hardest to do the treatment it is the most important to come to it.    She is seeing an MD today as well.        Mary Song, MS, RD, CSSD, LD  [facility]

## 2017-09-01 ENCOUNTER — OFFICE VISIT (OUTPATIENT)
Dept: ORTHOPEDICS | Facility: CLINIC | Age: 32
End: 2017-09-01

## 2017-09-01 VITALS — BODY MASS INDEX: 20.66 KG/M2 | HEIGHT: 64 IN | WEIGHT: 121 LBS | RESPIRATION RATE: 16 BRPM

## 2017-09-01 DIAGNOSIS — F50.9 EATING DISORDER, UNSPECIFIED: Primary | ICD-10-CM

## 2017-09-01 NOTE — LETTER
"  9/1/2017      RE: Maria De Jesus Hess  [id number]  [place] [address]        Subjective:   Maria De Jesus Hess is a 32 year old female who is here to f/u on her DXA and labs obtaine due to ED-NOS    -ALLERGIES: She is allergic to sulfa drugs.    CURRENT MEDICATIONS: None  Take Vit C, iron supplement 3-4 times per week.         Exam:   Resp 16  Ht 5' 4\" (1.626 m)  Wt 121 lb (54.9 kg)  BMI 20.77 kg/m2           Study Result      [facility]   [address] [place] [address]  Phone: [phone]   Fax: [phone]       Final     Patient name:                                              MARIA DE JESUS HESS ([id number] )  Patient demographics:                      [age] year [age] White Female of 64.0 in. height and 119.0 lbs. weight  Ordering provider:                             MIGUEL PEREZ  History:                                                        EATING DISORDER  Current treatments:                              Scan:                                                           DXA exam ([id number] ): [email or web address]  Exam date:                                                  08/23/2017      Dual energy x-ray absorptiometry results:      Region BMD T - score Z - score   L1-L4 1.110 g/cm  -0.6 -0.2             Neck Left 0.900 g/cm  -1.0 -0.5   Total Left 0.918 g/cm  -0.7 -0.3             Neck Right 0.850 g/cm  -1.4 -0.9   Total Right 0.946 g/cm  -0.5 -0.1   Total Body 1.145 g/cm  0.3 0.7           Grams % Tissue   Fat 9832 17.3   Lean 48611 82.7   BMC 2568 4.5   Tissue [id number] NA      Conclusions:                                    Presuming that the patient is not postmenopausal,   The most negative and valid Z-score of  -0.9 at the level of the right femoral neck, is WITHIN  expected range for age.                                       Body composition shows 17.3% fat.                                       The " risk of osteoporotic fracture increases approximately 2-fold for each 1.0 SD decrease in T-score.  Low bone density is not the only risk factor for fracture;  consider factors such as patient's age, fall risk, injury risk, previous osteoporotic fracture, family history of osteoporosis, etc.  People with elevated risk of fracture should be regularly evaluated for low bone mineral density.  For patients eligible for Medicare, routine testing is allowed once every 2 years.  Testing frequency can be increased for patients on corticosteroids.  Clinical correlation is recommended.      Feel free to contact DXA services if you have any questions or comments.  Thank you for the opportunity to be of service to you and your patient.     Principal result :      Gisel Hernandez MD, CCD  Associate Professor of Medicine  Division of Endocrinology     References:  1.  ISCD position statements:  www.[email or web address].org  (includes the report of the 2015  Position Development Conference)     Template revised 8/6/2015     Technical comments:         Satisfactory.      Results for LUANN HART (MRN [id number]) as of 9/1/2017 09:55   Ref. Range 8/23/2017 14:53 8/23/2017 16:45 8/23/2017 17:20   Sodium Latest Ref Range: 133 - 144 mmol/L  133    Potassium Latest Ref Range: 3.4 - 5.3 mmol/L  3.8    Chloride Latest Ref Range: 94 - 109 mmol/L  99    Carbon Dioxide Latest Ref Range: 20 - 32 mmol/L  25    Urea Nitrogen Latest Ref Range: 7 - 30 mg/dL  15    Creatinine Latest Ref Range: 0.52 - 1.04 mg/dL  0.71    GFR Estimate Latest Ref Range: >60 mL/min/1.7m2  >90    GFR Estimate If Black Latest Ref Range: >60 mL/min/1.7m2  >90    Calcium Latest Ref Range: 8.5 - 10.1 mg/dL  9.4    Anion Gap Latest Ref Range: 3 - 14 mmol/L  9    Albumin Latest Ref Range: 3.4 - 5.0 g/dL  4.4    Protein Total Latest Ref Range: 6.8 - 8.8 g/dL  8.0    Bilirubin Total Latest Ref Range: 0.2 - 1.3 mg/dL  1.2    Alkaline Phosphatase Latest Ref Range: 40 - 150 U/L  64     ALT Latest Ref Range: 0 - 50 U/L  50    AST Latest Ref Range: 0 - 45 U/L  43    Bilirubin Direct Latest Ref Range: 0.0 - 0.2 mg/dL  0.3 (H)    Estradiol Latest Units: pg/mL  229    Ferritin Latest Ref Range: 12 - 150 ng/mL  74    Free T3 Latest Ref Range: 2.3 - 4.2 pg/mL  2.2 (L)    FSH Latest Units: IU/L  2.6    TSH Latest Ref Range: 0.40 - 4.00 mU/L  0.56    Vitamin D Deficiency screening Latest Ref Range: 20 - 75 ug/L  39    Glucose Latest Ref Range: 70 - 99 mg/dL  71    WBC Latest Ref Range: 4.0 - 11.0 10e9/L  6.4    Hemoglobin Latest Ref Range: 11.7 - 15.7 g/dL  14.8    Hematocrit Latest Ref Range: 35.0 - 47.0 %  43.8    Platelet Count Latest Ref Range: 150 - 450 10e9/L  225    RBC Count Latest Ref Range: 3.8 - 5.2 10e12/L  4.50    MCV Latest Ref Range: 78 - 100 fl  97    MCH Latest Ref Range: 26.5 - 33.0 pg  32.9    MCHC Latest Ref Range: 31.5 - 36.5 g/dL  33.8    RDW Latest Ref Range: 10.0 - 15.0 %  12.0    Diff Method Unknown  Automated Method    % Neutrophils Latest Units: %  69.2    % Lymphocytes Latest Units: %  22.4    % Monocytes Latest Units: %  7.3    % Eosinophils Latest Units: %  0.2    % Basophils Latest Units: %  0.6    % Immature Granulocytes Latest Units: %  0.3    Nucleated RBCs Latest Ref Range: 0 /100  0    Absolute Neutrophil Latest Ref Range: 1.6 - 8.3 10e9/L  4.4    Absolute Lymphocytes Latest Ref Range: 0.8 - 5.3 10e9/L  1.4    Absolute Monocytes Latest Ref Range: 0.0 - 1.3 10e9/L  0.5    Absolute Eosinophils Latest Ref Range: 0.0 - 0.7 10e9/L  0.0    Absolute Basophils Latest Ref Range: 0.0 - 0.2 10e9/L  0.0    Abs Immature Granulocytes Latest Ref Range: 0 - 0.4 10e9/L  0.0    Absolute Nucleated RBC Unknown  0.0    Sed Rate Latest Ref Range: 0 - 20 mm/h  7    Lutropin Latest Units: IU/L  3.0    Parathyroid Hormone Intact Latest Ref Range: 12 - 72 pg/mL  43    Sex Hormone Binding Globulin Latest Ref Range: 30 - 135 nmol/L  201 (H)    Testosterone Total Latest Ref Range: 8 - 60 ng/dL  35     Free Testosterone Calculated Latest Ref Range: 0.13 - 0.92 ng/dL  0.15         Assessment/Plan:   ED-NOS  Normal BMD  Low free T3    PLAN:  I have reviewed the DXA and labs with the patient.  I have recommended continued nutritional and psychological counseling.  I have explained the importance of adequate nutrition on bone health and recovery from training to prevent injuries.  Discussed calcium intake and I have recommended 1000mg per day in divide portions over the day.  If she is deficient, then the difference should be made up with a calcium supplement but food sources are preferred. RTC in 2 months for check on progress and repeat testing of her free T3.      > 25 min of total time spent in one-on-one evalution and discussion with patient regarding nature of problem, course, prior treatments, and therapeutic options, at least 50% of which was spent in counseling and coordination of care:        Ludy Betts MD, CAQ, FACSM, CCD  [facility]  Sports Medicine and Bone Health  Team Physician;  Athletics

## 2017-09-01 NOTE — PROGRESS NOTES
" Subjective:   Maria De Jesus Hess is a 32 year old female who is here to f/u on her DXA and labs obtaine due to ED-NOS    -ALLERGIES: She is allergic to sulfa drugs.    CURRENT MEDICATIONS: None  Take Vit C, iron supplement 3-4 times per week.         Exam:   Resp 16  Ht 5' 4\" (1.626 m)  Wt 121 lb (54.9 kg)  BMI 20.77 kg/m2           Study Result      [facility]   [address] [place] [address]  Phone: [phone]   Fax: [phone]       Final     Patient name:                                              MARIA DE JESUS HESS ([id number] )  Patient demographics:                      32.2 year [age] White Female of 64.0 in. height and 119.0 lbs. weight  Ordering provider:                             MIGUEL PEREZ  History:                                                        EATING DISORDER  Current treatments:                              Scan:                                                           DXA exam ([id number] ): [employer]  Exam date:                                                  08/23/2017      Dual energy x-ray absorptiometry results:      Region BMD T - score Z - score   L1-L4 1.110 g/cm  -0.6 -0.2             Neck Left 0.900 g/cm  -1.0 -0.5   Total Left 0.918 g/cm  -0.7 -0.3             Neck Right 0.850 g/cm  -1.4 -0.9   Total Right 0.946 g/cm  -0.5 -0.1   Total Body 1.145 g/cm  0.3 0.7           Grams % Tissue   Fat 9832 17.3   Lean 57107 82.7   [facility] 2568 4.5   Tissue [id number] NA      Conclusions:                                    Presuming that the patient is not postmenopausal,   The most negative and valid Z-score of  -0.9 at the level of the right femoral neck, is WITHIN  expected range for age.                                       Body composition shows 17.3% fat.                                       The risk of osteoporotic fracture increases approximately 2-fold for each 1.0 SD decrease in " T-score.  Low bone density is not the only risk factor for fracture;  consider factors such as patient's age, fall risk, injury risk, previous osteoporotic fracture, family history of osteoporosis, etc.  People with elevated risk of fracture should be regularly evaluated for low bone mineral density.  For patients eligible for Medicare, routine testing is allowed once every 2 years.  Testing frequency can be increased for patients on corticosteroids.  Clinical correlation is recommended.      Feel free to contact DXA services if you have any questions or comments.  Thank you for the opportunity to be of service to you and your patient.     Principal result :      Gisel Hernandez MD, CCD  Associate Professor of Medicine  Division of Endocrinology     References:  1.  ISCD position statements:  www.[email or web address].org  (includes the report of the 2015  Position Development Conference)     Template revised 8/6/2015     Technical comments:         Satisfactory.      Results for LUANN HART (MRN [id number]) as of 9/1/2017 09:55   Ref. Range 8/23/2017 14:53 8/23/2017 16:45 8/23/2017 17:20   Sodium Latest Ref Range: 133 - 144 mmol/L  133    Potassium Latest Ref Range: 3.4 - 5.3 mmol/L  3.8    Chloride Latest Ref Range: 94 - 109 mmol/L  99    Carbon Dioxide Latest Ref Range: 20 - 32 mmol/L  25    Urea Nitrogen Latest Ref Range: 7 - 30 mg/dL  15    Creatinine Latest Ref Range: 0.52 - 1.04 mg/dL  0.71    GFR Estimate Latest Ref Range: >60 mL/min/1.7m2  >90    GFR Estimate If Black Latest Ref Range: >60 mL/min/1.7m2  >90    Calcium Latest Ref Range: 8.5 - 10.1 mg/dL  9.4    Anion Gap Latest Ref Range: 3 - 14 mmol/L  9    Albumin Latest Ref Range: 3.4 - 5.0 g/dL  4.4    Protein Total Latest Ref Range: 6.8 - 8.8 g/dL  8.0    Bilirubin Total Latest Ref Range: 0.2 - 1.3 mg/dL  1.2    Alkaline Phosphatase Latest Ref Range: 40 - 150 U/L  64    ALT Latest Ref Range: 0 - 50 U/L  50    AST Latest Ref Range: 0 - 45 U/L  43    Bilirubin  Direct Latest Ref Range: 0.0 - 0.2 mg/dL  0.3 (H)    Estradiol Latest Units: pg/mL  229    Ferritin Latest Ref Range: 12 - 150 ng/mL  74    Free T3 Latest Ref Range: 2.3 - 4.2 pg/mL  2.2 (L)    FSH Latest Units: IU/L  2.6    TSH Latest Ref Range: 0.40 - 4.00 mU/L  0.56    Vitamin D Deficiency screening Latest Ref Range: 20 - 75 ug/L  39    Glucose Latest Ref Range: 70 - 99 mg/dL  71    WBC Latest Ref Range: 4.0 - 11.0 10e9/L  6.4    Hemoglobin Latest Ref Range: 11.7 - 15.7 g/dL  14.8    Hematocrit Latest Ref Range: 35.0 - 47.0 %  43.8    Platelet Count Latest Ref Range: 150 - 450 10e9/L  225    RBC Count Latest Ref Range: 3.8 - 5.2 10e12/L  4.50    MCV Latest Ref Range: 78 - 100 fl  97    MCH Latest Ref Range: 26.5 - 33.0 pg  32.9    MCHC Latest Ref Range: 31.5 - 36.5 g/dL  33.8    RDW Latest Ref Range: 10.0 - 15.0 %  12.0    Diff Method Unknown  Automated Method    % Neutrophils Latest Units: %  69.2    % Lymphocytes Latest Units: %  22.4    % Monocytes Latest Units: %  7.3    % Eosinophils Latest Units: %  0.2    % Basophils Latest Units: %  0.6    % Immature Granulocytes Latest Units: %  0.3    Nucleated RBCs Latest Ref Range: 0 /100  0    Absolute Neutrophil Latest Ref Range: 1.6 - 8.3 10e9/L  4.4    Absolute Lymphocytes Latest Ref Range: 0.8 - 5.3 10e9/L  1.4    Absolute Monocytes Latest Ref Range: 0.0 - 1.3 10e9/L  0.5    Absolute Eosinophils Latest Ref Range: 0.0 - 0.7 10e9/L  0.0    Absolute Basophils Latest Ref Range: 0.0 - 0.2 10e9/L  0.0    Abs Immature Granulocytes Latest Ref Range: 0 - 0.4 10e9/L  0.0    Absolute Nucleated RBC Unknown  0.0    Sed Rate Latest Ref Range: 0 - 20 mm/h  7    Lutropin Latest Units: IU/L  3.0    Parathyroid Hormone Intact Latest Ref Range: 12 - 72 pg/mL  43    Sex Hormone Binding Globulin Latest Ref Range: 30 - 135 nmol/L  201 (H)    Testosterone Total Latest Ref Range: 8 - 60 ng/dL  35    Free Testosterone Calculated Latest Ref Range: 0.13 - 0.92 ng/dL  0.15          Assessment/Plan:   ED-NOS  Normal BMD  Low free T3    PLAN:  I have reviewed the DXA and labs with the patient.  I have recommended continued nutritional and psychological counseling.  I have explained the importance of adequate nutrition on bone health and recovery from training to prevent injuries.  Discussed calcium intake and I have recommended 1000mg per day in divide portions over the day.  If she is deficient, then the difference should be made up with a calcium supplement but food sources are preferred. RTC in 2 months for check on progress and repeat testing of her free T3.      > 25 min of total time spent in one-on-one evalution and discussion with patient regarding nature of problem, course, prior treatments, and therapeutic options, at least 50% of which was spent in counseling and coordination of care:        Ludy Betts MD, CAQ, FACSM, CCD  [facility]  Sports Medicine and Bone Health  Team Physician;  Athletics

## 2017-09-01 NOTE — MR AVS SNAPSHOT
After Visit Summary   9/1/2017    Maria De Jesus Hess    MRN: [id number]           Patient Information     Date Of Birth          1985        Visit Information        Provider Department      9/1/2017 9:40 AM Ludy Betts MD [facility]        Today's Diagnoses     Eating disorder, unspecified    -  1       Follow-ups after your visit        Your next 10 appointments already scheduled     Sep 13, 2017  1:30 PM CDT   NUTRITION VISIT with [facility] ([facility])    [address] [place] [id number] [phone]            Sep 20, 2017  1:00 PM CDT   NUTRITION VISIT with [facility] ([facility])    [address] [place] [id number] [phone]            Sep 27, 2017  1:00 PM CDT   NUTRITION VISIT with [facility] ([facility])    [address] [place] [id number] [phone]            Oct 04, 2017  1:30 PM CDT   NUTRITION VISIT with [facility] ([facility])    [address] [place] [id number] [phone]            Oct 11, 2017  1:00 PM CDT   NUTRITION VISIT with [facility] ([facility])    [address] [place] [id number] [phone]            Oct 18, 2017  1:00 PM CDT   NUTRITION VISIT with [facility] ([facility])    [address] [place] [id number] [phone]            Oct 25, 2017  1:00 PM CDT   NUTRITION VISIT with [facility] ([facility]  "[facility])    [address] [facility]  [address] [facility] [address] [phone]              Who to contact     Please call your clinic at [phone] to:    Ask questions about your health    Make or cancel appointments    Discuss your medicines    Learn about your test results    Speak to your doctor   If you have compliments or concerns about an experience at your clinic, or if you wish to file a complaint, please contact [facility] Patient Relations at [phone] or email us at [email or web address]         Additional Information About Your Visit        [employer] Information     [employer] gives you secure access to your electronic health record. If you see a primary care provider, you can also send messages to your care team and make appointments. If you have questions, please call your primary care clinic.  If you do not have a primary care provider, please call [phone] and they will assist you.      [employer] is an electronic gateway that provides easy, online access to your medical records. With [employer], you can request a clinic appointment, read your test results, renew a prescription or communicate with your care team.     To access your existing account, please contact your [facility] or call [phone] for assistance.        Care EveryWhere ID     This is your Care EveryWhere ID. This could be used by other organizations to access your [facility] medical records  [id number]        Your Vitals Were     Respirations Height BMI (Body Mass Index)             16 5' 4\" (1.626 m) 20.77 kg/m2          Blood Pressure from Last 3 Encounters:   08/23/17 116/80   02/02/17 138/87    Weight from Last 3 Encounters:   09/06/17 118 lb 3.2 oz (53.6 kg)   09/01/17 121 lb (54.9 kg)   08/30/17 121 lb 6.4 oz (55.1 kg)              Today, you had the following     No orders found for display       Primary Care Provider Office Phone # Fax #    " Majo Doherty [phone]       [address] [facility] [address]        Equal Access to Services     CESAR SHI : Desi Bonilla, lexi tsang, paulzaria kasusanda dawoodcale, waxfatmata luis ein hayaaedouard seaytae hernandezfiliberto dinh. So [facility] [phone].    ATENCIÓN: Si habla español, tiene a briscoe disposición servicios gratuitos de asistencia lingüística. Llame al [phone].    We comply with applicable federal civil rights laws and [place] laws. We do not discriminate on the basis of race, color, national origin, age, disability sex, sexual orientation or gender identity.            Thank you!     Thank you for choosing [facility]  for your care. Our goal is always to provide you with excellent care. Hearing back from our patients is one way we can continue to improve our services. Please take a few minutes to complete the written survey that you may receive in the mail after your visit with us. Thank you!             Your Updated Medication List - Protect others around you: Learn how to safely use, store and throw away your medicines at www.disposemymeds.org.      Notice  As of 9/1/2017 11:59 PM    You have not been prescribed any medications.

## 2017-09-06 ENCOUNTER — ALLIED HEALTH/NURSE VISIT (OUTPATIENT)
Dept: INTERNAL MEDICINE | Facility: CLINIC | Age: 32
End: 2017-09-06

## 2017-09-06 VITALS — WEIGHT: 118.2 LBS | BODY MASS INDEX: 20.29 KG/M2

## 2017-09-06 DIAGNOSIS — F50.019 ANOREXIA NERVOSA, RESTRICTING TYPE: ICD-10-CM

## 2017-09-06 DIAGNOSIS — Z71.3 DIETARY COUNSELING: Primary | ICD-10-CM

## 2017-09-06 NOTE — PROGRESS NOTES
Maria De Jesus Hess  is a 31 year old female presents today for follow-up nutrition consultation. She was able to ask for extra support from her therapist, and scheduled appointments twice per week.     Vitals:  Wt Readings from Last 4 Encounters:   09/06/17 118 lb 3.2 oz (53.6 kg)   09/01/17 121 lb (54.9 kg)   08/30/17 121 lb 6.4 oz (55.1 kg)   08/23/17 119 lb 4.8 oz (54.1 kg)       Nutrition History  Patient is on a regular  diet at home.  Recall:  7:00:  Greek yogurt, 1/4 cup oats, 1 tbsp pb, banana  14:00: Banana, Greek yogurt (chobani)  17:45:  Salad, salsa, hummus, cheese, tortilla chips (handful)    1,700 calorie deficit when comparing intake to energy expenditure (basal + exercise).     Bedtime snack: Banana, pb, greek yogurt, potato starch    Physical Activity  55 miles per week.      Time with Patient:  60 Minutes    Nutrition  DX:.   1. Dietary counseling    2. Anorexia nervosa, restricting type      She has severe eating disorder.  Needs higher level care, but even being able to come to nutrition and therapy appointments is a struggle.  Today she was able to verbalize the need for more frequent therapy and nutrition appointments.     Nutrition Goals:   1:   Developed contract (therapist, dietitian, and MD appointments to be scheduled regularly, 3 meals per day, and no weight checking) to ensure completion of goals, or recommending higher level of care if unable to complete these goals.  2: Provided balanced lunch options to eat everyday. Must bring to next appointment.  4: Continue tracking food intake via photos.   Concern about her health and well being.  She needs higher level care.  Expressed my concerns, but she will not be leaving her job for higher level care. Discussed that treatment is uncomfortable and will be uncomfortable.  When it is hardest to do the treatment it is the most important to come to it.    She is seeing an MD today as well.        Mary Song, MS, RD, CSSD, LD  [facility]

## 2017-09-06 NOTE — MR AVS SNAPSHOT
After Visit Summary   9/6/2017    Maria De Jesus Hess    MRN: [id number]           Patient Information     Date Of Birth          1985        Visit Information        Provider Department      9/6/2017 1:00 PM Mary Song RD [facility]        Today's Diagnoses     Dietary counseling    -  1    Anorexia nervosa, restricting type           Follow-ups after your visit        Your next 10 appointments already scheduled     Sep 13, 2017  1:30 PM CDT   NUTRITION VISIT with [facility] ([facility])    [address] [id number] [phone]            Sep 20, 2017  1:00 PM CDT   NUTRITION VISIT with [facility] ([facility])    [address] [place] [id number] [phone]            Sep 27, 2017  1:00 PM CDT   NUTRITION VISIT with [facility] ([facility])    [address] [place] [id number] [phone]            Oct 04, 2017  1:30 PM CDT   NUTRITION VISIT with [facility] ([facility])    [address] [id number] [phone]            Oct 11, 2017  1:00 PM CDT   NUTRITION VISIT with [facility] ([facility])    [address] [id number] [phone]            Oct 18, 2017  1:00 PM CDT   NUTRITION VISIT with [facility] ([facility])    [address] [id number] [phone]            Oct 25, 2017  1:00 PM CDT   NUTRITION VISIT with [facility] ([facility])    [address] [facility]  5th Floor  [facility] [id number] [phone]              Who to contact     Please call your clinic at [phone] to:    Ask questions about your health    Make or cancel appointments    Discuss your medicines    Learn about your test results    Speak to your doctor   If you have compliments or concerns about an experience at your clinic, or if you wish to file a complaint, please contact [facility] Patient Relations at [phone] or email us at [email or web address]         Additional Information About Your Visit        [employer] Information     [employer] gives you secure access to your electronic health record. If you see a primary care provider, you can also send messages to your care team and make appointments. If you have questions, please call your primary care clinic.  If you do not have a primary care provider, please call [phone] and they will assist you.      [employer] is an electronic gateway that provides easy, online access to your medical records. With [employer], you can request a clinic appointment, read your test results, renew a prescription or communicate with your care team.     To access your existing account, please contact your [facility] or call [phone] for assistance.        Care EveryWhere ID     This is your Care EveryWhere ID. This could be used by other organizations to access your [place] medical records  [id number]        Your Vitals Were     BMI (Body Mass Index)                   20.29 kg/m2            Blood Pressure from Last 3 Encounters:   08/23/17 116/80   02/02/17 138/87    Weight from Last 3 Encounters:   09/06/17 118 lb 3.2 oz (53.6 kg)   09/01/17 121 lb (54.9 kg)   08/30/17 121 lb 6.4 oz (55.1 kg)              We Performed the Following     MNT INDIVIDUAL F/U REASSESS, EA 15 MIN        Primary Care Provider Office Phone #  Fax #    Majo Doherty [phone]       [address] [facility] [address]        Equal Access to Services     CESAR SHI : Desi Bonilla, wabusterda prasannaadaha, qalesta kaalmada dawoodferda, delgado luis ein hayaaedouard seaytae carlylekarlyfiliberto dinh. So [facility] [phone].    ATENCIÓN: Si habla español, tiene a briscoe disposición servicios gratuitos de asistencia lingüística. Llame al [phone].    We comply with applicable federal civil rights laws and [place] laws. We do not discriminate on the basis of race, color, national origin, age, disability sex, sexual orientation or gender identity.            Thank you!     Thank you for choosing [facility]  for your care. Our goal is always to provide you with excellent care. Hearing back from our patients is one way we can continue to improve our services. Please take a few minutes to complete the written survey that you may receive in the mail after your visit with us. Thank you!             Your Updated Medication List - Protect others around you: Learn how to safely use, store and throw away your medicines at www.disposemymeds.org.      Notice  As of 9/6/2017  2:16 PM    You have not been prescribed any medications.

## 2017-09-13 ENCOUNTER — ALLIED HEALTH/NURSE VISIT (OUTPATIENT)
Dept: INTERNAL MEDICINE | Facility: CLINIC | Age: 32
End: 2017-09-13

## 2017-09-13 VITALS — BODY MASS INDEX: 20.22 KG/M2 | WEIGHT: 118.4 LBS | HEIGHT: 64 IN

## 2017-09-13 DIAGNOSIS — R63.0 ANOREXIA: ICD-10-CM

## 2017-09-13 DIAGNOSIS — Z71.3 DIETARY COUNSELING: Primary | ICD-10-CM

## 2017-09-13 NOTE — PROGRESS NOTES
Maria De Jesus Hess  is a 31 year old female presents today for follow-up nutrition consultation.   She wanted to quit her nutrition and therapy appointments.        Vitals:  Wt Readings from Last 4 Encounters:   09/13/17 53.7 kg (118 lb 6.4 oz)   09/06/17 53.6 kg (118 lb 3.2 oz)   09/01/17 54.9 kg (121 lb)   08/30/17 55.1 kg (121 lb 6.4 oz)       Nutrition History  Patient is on a regular  diet at home.  Recall:  7:00:  Half of described breakfast:  1c of [employer] milk, BiPro protein powder, 1/4c of oats, 1T of PB.   12:00:  Rx bar  17:40:  Green beans, salmon, chips and tortilla  20:00:  Yogurt, potato starch    1,700 calorie deficit when comparing intake to energy expenditure (basal + exercise).     Physical Activity  55 miles per week.      Time with Patient:45   Minutes    Nutrition  DX:.   1. Dietary counseling    2. Anorexia      She has severe eating disorder.  Needs higher level care, but even being able to come to nutrition and therapy appointments is a struggle.  Today she was able to verbalize the need for more frequent therapy and nutrition appointments.     Nutrition Goals:   1:   Developed contract (therapist, dietitian, and MD appointments to be scheduled regularly, 3 meals per day, and no weight checking) to ensure completion of goals, or recommending higher level of care if unable to complete these goals.  2: She will need to start searching for higher level care.  Provided info on local places as well as out of state.    Mary Song, MS, RD, CSSD, LD  [facility]

## 2017-09-20 ENCOUNTER — ALLIED HEALTH/NURSE VISIT (OUTPATIENT)
Dept: INTERNAL MEDICINE | Facility: CLINIC | Age: 32
End: 2017-09-20

## 2017-09-20 VITALS — WEIGHT: 119 LBS | HEIGHT: 64 IN | BODY MASS INDEX: 20.32 KG/M2

## 2017-09-20 DIAGNOSIS — F50.019 ANOREXIA NERVOSA, RESTRICTING TYPE: ICD-10-CM

## 2017-09-20 DIAGNOSIS — Z71.3 DIETARY COUNSELING: Primary | ICD-10-CM

## 2017-09-20 NOTE — PROGRESS NOTES
Maria De Jesus Hess  is a 31 year old female presents today for follow-up nutrition consultation.  She believes that she made a mistake by expressing wanting help.  She also seemed very deep into the eating disorder.     Vitals:  Wt Readings from Last 4 Encounters:   09/20/17 119 lb (54 kg)   09/13/17 118 lb 6.4 oz (53.7 kg)   09/06/17 118 lb 3.2 oz (53.6 kg)   09/01/17 121 lb (54.9 kg)       Nutrition History  Patient is on a regular  diet at home.  Recall on [employer] race.   9:00:  Kind granola bar  13:00:  11 mile leg of JESUS  15:00:  1 bagel with PB, BiPro  23:00:  6 mile leg of JESUS  00:00:  Bagel with PB, BiPro  5:00:  3 mile leg of JESUS  11:00:  Half of sandwitch     Based on above she is only consumed 8685-9733 kcals with is not enough to cover expenses of running JESUS and not sleeping.     Physical Activity  55 miles per week.      Time with Patient: 60 minutes    Nutrition  DX:.   1. Dietary counseling    2. Anorexia nervosa, restricting type      She has severe eating disorder.  Needs higher level care, but even being able to come to nutrition and therapy appointments is a struggle.  She expressed in the appointment that she may not want to continue to get help.  I have heard that before from her, but this time seemed different.  I think she may not want to struggle to recover.        Nutrition Goals:   1:  No checking weight.  2: Look into the places for higher level care.  Provided info on local places as well as out of state places to lear and read about their philosophy  3:  Expressed my concerns about the eating disorder and severity of eating disorder.   Suspect that client will not come back to outpatient appointment due to discomfort of going through eating disorder help.     Mary Song, MS, RD, CSSD, LD   HEALTH

## 2017-09-20 NOTE — MR AVS SNAPSHOT
After Visit Summary   9/20/2017    Maria De Jesus Hess    MRN: [id number]           Patient Information     Date Of Birth          1985        Visit Information        Provider Department      9/20/2017 1:00 PM Mary Song RD [facility]        Today's Diagnoses     Dietary counseling    -  1    Anorexia nervosa, restricting type           Follow-ups after your visit        Your next 10 appointments already scheduled     Sep 27, 2017  1:00 PM CDT   NUTRITION VISIT with CRISTINA Morales [facility] ([facility])    [address] [place] [id number] [phone]            Oct 04, 2017  1:30 PM CDT   NUTRITION VISIT with CRISTINA Morales [facility] ([facility])    [address] [place] [phone]            Oct 11, 2017  1:00 PM CDT   NUTRITION VISIT with CRISTINA Morales [facility] ([facility])    [address] [place] [phone] [phone]            Oct 18, 2017  1:00 PM CDT   NUTRITION VISIT with CRISTINA Morales [facility] ([facility])    [address] [place] [id number] [phone]            Oct 25, 2017  1:00 PM CDT   NUTRITION VISIT with CRISTINA Morales [facility] ([facility])    [address] [place] [id number] [phone]              Who to contact     Please call your clinic at [phone] to:    Ask questions about your health    Make or cancel appointments    Discuss your medicines    Learn about your test results    Speak to your doctor   If you have compliments or concerns about an experience at your clinic, or if you wish to file a complaint,  "please contact [facility] Patient Relations at [phone] or email us at [email or web address]         Additional Information About Your Visit        [employer] Information     [employer] gives you secure access to your electronic health record. If you see a primary care provider, you can also send messages to your care team and make appointments. If you have questions, please call your primary care clinic.  If you do not have a primary care provider, please call [phone] and they will assist you.      [employer] is an electronic gateway that provides easy, online access to your medical records. With [employer], you can request a clinic appointment, read your test results, renew a prescription or communicate with your care team.     To access your existing account, please contact your [facility] or call [phone] for assistance.        Care EveryWhere ID     This is your Care EveryWhere ID. This could be used by other organizations to access your [place] medical records  [id number]        Your Vitals Were     Height BMI (Body Mass Index)                5' 4\" (1.626 m) 20.43 kg/m2           Blood Pressure from Last 3 Encounters:   08/23/17 116/80   02/02/17 138/87    Weight from Last 3 Encounters:   09/20/17 119 lb (54 kg)   09/13/17 118 lb 6.4 oz (53.7 kg)   09/06/17 118 lb 3.2 oz (53.6 kg)              We Performed the Following     MNT INDIVIDUAL F/U REASSESS, EA 15 MIN        Primary Care Provider Office Phone # Fax #    Majo Doherty [phone]       [address] [facility] [address]        Equal Access to Services     CESAR SIH : Hadii aad radha hadashvance Soomaali, waaxda luqadaha, qaybta kaalmada delgado jones. So [facility] [phone].    ATENCIÓN: Si habla español, tiene a briscoe disposición servicios gratuitos de asistencia lingüística. Llame al [phone].    We comply " with applicable federal civil rights laws and [place] laws. We do not discriminate on the basis of race, color, national origin, age, disability sex, sexual orientation or gender identity.            Thank you!     Thank you for choosing [facility]  for your care. Our goal is always to provide you with excellent care. Hearing back from our patients is one way we can continue to improve our services. Please take a few minutes to complete the written survey that you may receive in the mail after your visit with us. Thank you!             Your Updated Medication List - Protect others around you: Learn how to safely use, store and throw away your medicines at www.disposemymeds.org.      Notice  As of 9/20/2017  3:13 PM    You have not been prescribed any medications.

## 2017-09-22 ENCOUNTER — OFFICE VISIT (OUTPATIENT)
Dept: ORTHOPEDICS | Facility: CLINIC | Age: 32
End: 2017-09-22

## 2017-09-22 VITALS
RESPIRATION RATE: 14 BRPM | BODY MASS INDEX: 20.74 KG/M2 | SYSTOLIC BLOOD PRESSURE: 136 MMHG | OXYGEN SATURATION: 99 % | WEIGHT: 121.5 LBS | HEIGHT: 64 IN | HEART RATE: 59 BPM | DIASTOLIC BLOOD PRESSURE: 91 MMHG

## 2017-09-22 DIAGNOSIS — M54.5 LOW BACK PAIN, UNSPECIFIED BACK PAIN LATERALITY, UNSPECIFIED CHRONICITY, WITH SCIATICA PRESENCE UNSPECIFIED: Primary | ICD-10-CM

## 2017-09-22 DIAGNOSIS — M54.16 PAIN, RADICULAR, LUMBAR: ICD-10-CM

## 2017-09-22 DIAGNOSIS — M76.899 HAMSTRING TENDINITIS: ICD-10-CM

## 2017-09-22 RX ORDER — METHYLPREDNISOLONE 4 MG
TABLET, DOSE PACK ORAL
Qty: 21 TABLET | Refills: 0 | Status: SHIPPED | OUTPATIENT
Start: 2017-09-22 | End: 2018-11-21

## 2017-09-22 NOTE — LETTER
9/22/2017       RE: Maria De Jesus Hess  [address] [place] [address] [id number]  [facility] [address]     Dear Colleague,    Thank you for referring your patient, Maria De Jesus Hess, to the [facility] at [facility]. Please see a copy of my visit note below.    HISTORY OF PRESENT ILLNESS  Ms. Hess is a pleasant 32 year old year old female who presents to clinic today with right hamstring and low back pain  Maria De Jesus explains that she wants a MRI for her hip. She has developed low back and hip pain for the past few weeks. She has been having more pain in her right hip and gluteal area for the past few weeks. She has had a long history of hamstring problems and had PRP  Location: right hip/low back  Quality:  achy pain    Severity: 4/10 at worst    Duration: a few weeks  Timing: occurs intermittently  Context: occurs while exercising and lifting  Modifying factors:  resting and non-use makes it better, movement and use makes it worse  Associated signs & symptoms: right hip and low back    Additional history: as documented    MEDICAL HISTORY  Patient Active Problem List   Diagnosis     Left hamstring injury, subsequent encounter       Current Outpatient Prescriptions   Medication Sig Dispense Refill     Acetaminophen (TYLENOL PO) Take 1,000 mg by mouth every 8 hours as needed for mild pain or fever         Allergies   Allergen Reactions     Sulfa Drugs Hives       No family history on file.    Additional medical/Social/Surgical histories reviewed in [facility] and updated as appropriate.     REVIEW OF SYSTEMS (9/22/2017)  10 point ROS of systems including Constitutional, Eyes, Respiratory, Cardiovascular, Gastroenterology, Genitourinary, Integumentary, Musculoskeletal, Psychiatric were all negative except for pertinent positives noted in my HPI.     PHYSICAL EXAM  Vitals:    09/22/17 1728   BP: (!) 136/91   BP Location: Right arm   Patient Position: Chair   Cuff Size: Adult Regular   Pulse:  "59   Resp: 14   SpO2: 99%   Weight: 55.1 kg (121 lb 8 oz)   Height: 1.622 m (5' 3.86\")     Vital Signs: BP (!) 136/91 (BP Location: Right arm, Patient Position: Chair, Cuff Size: Adult Regular)  Pulse 59  Resp 14  Ht 1.622 m (5' 3.86\")  Wt 55.1 kg (121 lb 8 oz)  SpO2 99%  BMI 20.95 kg/m2 Patient declined being weighed. Body mass index is 20.95 kg/(m^2).    General  - normal appearance, in no obvious distress  CV  - normal femoral pulse  Pulm  - normal respiratory pattern, non-labored  Musculoskeletal - right hip, low back  - stance: normal gait without limp, no obvious leg length discrepancy, normal heel and toe walk  - inspection: no swelling or effusion,  normal bone and joint alignment, no obvious deformity  - palpation: no lateral or anterior hip tenderness  - ROM: some pain with flexion and internal rotation of right low back, and has pain with extension in low back and flexion,  abduction, and adduction  - strength: 5/5 in all planes  - special tests:  (-) JESSA  (-) FADIR  Pain with SLR on right causes pain in right leg  no pain with axial femoral load  Neuro  - no sensory or motor deficit, grossly normal coordination, normal muscle tone  Skin  - no ecchymosis, erythema, warmth, or induration, no obvious rash  Psych  - interactive, appropriate, normal mood and affect    ASSESSMENT & PLAN  33 yo female with right hip pain due to hamstring tendinopathy and lumbar radicular pain  xrays lumbar spine reviewed: shows narrowing at L5/S1  Ordered MRI pelvis and lumbar spine at patient's request for further evaluation  Medrol dose pack  F/u by PHONE after MRIs to discuss further planning      Again, thank you for allowing me to participate in the care of your patient.      Sincerely,    Kunal Cantu MD      "

## 2017-09-22 NOTE — NURSING NOTE
"Reason For Visit:   Chief Complaint   Patient presents with     Musculoskeletal Problem     chronic right hip pain with increased pain over the past month       Primary MD: Majo Doherty  Ref. MD: self    ?  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  Work status?  Full time.  Date of injury: unknown  Type of injury: pain increased following race last weekend.  Date of surgery: none  Smoker: No    BP (!) 136/91 (BP Location: Right arm, Patient Position: Chair, Cuff Size: Adult Regular)  Pulse 59  Resp 14  Ht 1.622 m (5' 3.86\")  Wt 55.1 kg (121 lb 8 oz)  SpO2 99%  BMI 20.95 kg/m2    Pain Assessment  Patient Currently in Pain: Yes  0-10 Pain Scale: 5  Primary Pain Location: Hip  Pain Orientation: Right  Pain Descriptors: Sharp, Shooting  Alleviating Factors: Rest, Pain medication, Other (comment) (dry needling)  Aggravating Factors: Exercise       Shira Miranda LPN  "

## 2017-09-22 NOTE — MR AVS SNAPSHOT
After Visit Summary   9/22/2017    Maria De Jesus Hess    MRN: [id number]           Patient Information     Date Of Birth          1985        Visit Information        Provider Department      9/22/2017 6:00 PM Kunal Cantu MD [facility]        Today's Diagnoses     Low back pain, unspecified back pain laterality, unspecified chronicity, with sciatica presence unspecified    -  1    Pain, radicular, lumbar        Hamstring tendinitis           Follow-ups after your visit        Your next 10 appointments already scheduled     Sep 26, 2017  5:00 PM CDT   MR LUMBAR SPINE W/O CONTRAST with UUMR2   [facility], [place], MRI ([facility], [facility])    [address] [address] [phone]           Take your medicines as usual, unless your doctor tells you not to. Bring a list of your current medicines to your exam (including vitamins, minerals and over-the-counter drugs). Also bring the results of similar scans you may have had.  Please remove any body piercings and hair extensions before you arrive.  Follow your doctor s orders. If you do not, we may have to postpone your exam.  You will not have contrast for this exam. You do not need to do anything special to prepare.  The MRI machine uses a strong magnet. Please wear clothes without metal (snaps, zippers). A sweatsuit works well, or we may give you a hospital gown.   **IMPORTANT** THE INSTRUCTIONS BELOW ARE ONLY FOR THOSE PATIENTS WHO HAVE BEEN TOLD THEY WILL RECEIVE SEDATION OR GENERAL ANESTHESIA DURING THEIR MRI PROCEDURE:  IF YOU WILL RECEIVE SEDATION (take medicine to help you relax during your exam):   You must get the medicine from your doctor before you arrive. Bring the medicine to the exam. Do not take it at home.   Arrive one hour early. Bring someone who can take you home after the test. Your medicine will make you sleepy. After the exam, you may not drive,  take a bus or take a taxi by yourself.   No eating 8 hours before your exam. You may have clear liquids up until 4 hours before your exam. (Clear liquids include water, clear tea, black coffee and fruit juice without pulp.)  IF YOU WILL RECEIVE ANESTHESIA (be asleep for your exam):   Arrive 1 1/2 hours early. Bring someone who can take you home after the test. You may not drive, take a bus or take a taxi by yourself.   No eating 8 hours before your exam. You may have clear liquids up until 4 hours before your exam. (Clear liquids include water, clear tea, black coffee and fruit juice without pulp.)   You will spend four to five hours in the recovery room.  Please call the Imaging Department at your exam site with any questions.            Sep 26, 2017  6:00 PM CDT   MR PELVIS W/O CONTRAST with UUMR2   [facility], [place], [facility] ([facility])    [address] [address] [phone]           Take your medicines as usual, unless your doctor tells you not to. Bring a list of your current medicines to your exam (including vitamins, minerals and over-the-counter drugs). Also bring the results of similar scans you may have had.  Please remove any body piercings and hair extensions before you arrive.  Follow your doctor s orders. If you do not, we may have to postpone your exam.  You will not have contrast for this exam. You do not need to do anything special to prepare.  The MRI machine uses a strong magnet. Please wear clothes without metal (snaps, zippers). A sweatsuit works well, or we may give you a hospital gown.   **IMPORTANT** THE INSTRUCTIONS BELOW ARE ONLY FOR THOSE PATIENTS WHO HAVE BEEN TOLD THEY WILL RECEIVE SEDATION OR GENERAL ANESTHESIA DURING THEIR MRI PROCEDURE:  IF YOU WILL RECEIVE SEDATION (take medicine to help you relax during your exam):   You must get the medicine from your doctor before you arrive. Bring the medicine to the exam.  Do not take it at home.   Arrive one hour early. Bring someone who can take you home after the test. Your medicine will make you sleepy. After the exam, you may not drive, take a bus or take a taxi by yourself.   No eating 8 hours before your exam. You may have clear liquids up until 4 hours before your exam. (Clear liquids include water, clear tea, black coffee and fruit juice without pulp.)  IF YOU WILL RECEIVE ANESTHESIA (be asleep for your exam):   Arrive 1 1/2 hours early. Bring someone who can take you home after the test. You may not drive, take a bus or take a taxi by yourself.   No eating 8 hours before your exam. You may have clear liquids up until 4 hours before your exam. (Clear liquids include water, clear tea, black coffee and fruit juice without pulp.)   You will spend four to five hours in the recovery room.  Please call the Imaging Department at your exam site with any questions.            Sep 27, 2017  1:00 PM CDT   NUTRITION VISIT with [facility]STINA Morales [facility] ([facility])    [address] [place] [id number] [phone]            Oct 04, 2017  1:30 PM CDT   NUTRITION VISIT with [facility]STINA [facility] ([facility])    [address] [place] [address]   [phone]            Oct 11, 2017  1:00 PM CDT   NUTRITION VISIT with CRISTINA [facility]ales [facility] ([facility])    [address] [place] [address]   [phone]            Oct 18, 2017  1:00 PM CDT   NUTRITION VISIT with CRISTINA Morales [facility] ([facility])    [address] [place] [address]   [phone]            Oct 25, 2017  1:00 PM CDT   NUTRITION VISIT with [facility]ST[facility]  "([facility])    [address] [facility] [id number] [phone]              Future tests that were ordered for you today     Open Future Orders        Priority Expected Expires Ordered    MR Lumbar Spine w/o Contrast Routine  9/22/2018 9/22/2017    MR Pelvis w/o Contrast Routine  9/22/2018 9/22/2017            Who to contact     Please call your clinic at [phone] to:    Ask questions about your health    Make or cancel appointments    Discuss your medicines    Learn about your test results    Speak to your doctor   If you have compliments or concerns about an experience at your clinic, or if you wish to file a complaint, please contact [facility] Patient Relations at [phone] or email us at [email or web address]         Additional Information About Your Visit        [facility] Information     [employer] gives you secure access to your electronic health record. If you see a primary care provider, you can also send messages to your care team and make appointments. If you have questions, please call your primary care clinic.  If you do not have a primary care provider, please call [phone] and they will assist you.      [employer] is an electronic gateway that provides easy, online access to your medical records. With [employer], you can request a clinic appointment, read your test results, renew a prescription or communicate with your care team.     To access your existing account, please contact your [facility] or call [phone] for assistance.        Care EveryWhere ID     This is your Care EveryWhere ID. This could be used by other organizations to access your [place] medical records  [id number]        Your Vitals Were     Pulse Respirations Height Pulse Oximetry BMI (Body Mass Index)       59 14 1.622 m (5' 3.86\") 99% 20.95 kg/m2        Blood Pressure from Last 3 Encounters:   09/22/17 (!) " 136/91   08/23/17 116/80   02/02/17 138/87    Weight from Last 3 Encounters:   09/22/17 55.1 kg (121 lb 8 oz)   09/20/17 54 kg (119 lb)   09/13/17 53.7 kg (118 lb 6.4 oz)                 Today's Medication Changes          These changes are accurate as of: 9/22/17 11:59 PM.  If you have any questions, ask your nurse or doctor.               Start taking these medicines.        Dose/Directions    methylPREDNISolone 4 MG tablet   Commonly known as:  MEDROL DOSEPAK   Used for:  Low back pain, unspecified back pain laterality, unspecified chronicity, with sciatica presence unspecified, Pain, radicular, lumbar   Started by:  Kunal Cantu MD        Follow package instructions   Quantity:  21 tablet   Refills:  0            Where to get your medicines      These medications were sent to [facility] [address] [place], [place] - [address]  [address] [facility] [address]     Phone:  [phone]     methylPREDNISolone 4 MG tablet                Primary Care Provider Office Phone # Fax #    Majo Doherty [phone]       [address] [facility] [address]        Equal Access to Services     JACKELYN SHI AH: Hadii lalo garcia hadamario Sojamie, waaxda luqadaha, qaybta kaalmada adeegyada, delgado dinh. So [facility] [phone].    ATENCIÓN: Si habla español, tiene a briscoe disposición servicios gratuitos de asistencia lingüística. [facility] [phone].    We comply with applicable federal civil rights laws and [place] laws. We do not discriminate on the basis of race, color, national origin, age, disability sex, sexual orientation or gender identity.            Thank you!     Thank you for choosing [facility]  for your care. Our goal is always to provide you with excellent care. Hearing back from our patients is one way we can continue to improve our services. Please take a few minutes to complete the written survey that you may  receive in the mail after your visit with us. Thank you!             Your Updated Medication List - Protect others around you: Learn how to safely use, store and throw away your medicines at www.disposemymeds.org.          This list is accurate as of: 9/22/17 11:59 PM.  Always use your most recent med list.                   Brand Name Dispense Instructions for use Diagnosis    methylPREDNISolone 4 MG tablet    MEDROL DOSEPAK    21 tablet    Follow package instructions    Low back pain, unspecified back pain laterality, unspecified chronicity, with sciatica presence unspecified, Pain, radicular, lumbar       TYLENOL PO      Take 1,000 mg by mouth every 8 hours as needed for mild pain or fever

## 2017-09-22 NOTE — PROGRESS NOTES
"HISTORY OF PRESENT ILLNESS  Ms. Hess is a pleasant 32 year old year old female who presents to clinic today with right hamstring and low back pain  Maria De Jesus explains that she wants a MRI for her hip. She has developed low back and hip pain for the past few weeks. She has been having more pain in her right hip and gluteal area for the past few weeks. She has had a long history of hamstring problems and had PRP  Location: right hip/low back  Quality:  achy pain    Severity: 4/10 at worst    Duration: a few weeks  Timing: occurs intermittently  Context: occurs while exercising and lifting  Modifying factors:  resting and non-use makes it better, movement and use makes it worse  Associated signs & symptoms: right hip and low back    Additional history: as documented    MEDICAL HISTORY  Patient Active Problem List   Diagnosis     Left hamstring injury, subsequent encounter       Current Outpatient Prescriptions   Medication Sig Dispense Refill     Acetaminophen (TYLENOL PO) Take 1,000 mg by mouth every 8 hours as needed for mild pain or fever         Allergies   Allergen Reactions     Sulfa Drugs Hives       No family history on file.    Additional medical/Social/Surgical histories reviewed in [facility] and updated as appropriate.     REVIEW OF SYSTEMS (9/22/2017)  10 point ROS of systems including Constitutional, Eyes, Respiratory, Cardiovascular, Gastroenterology, Genitourinary, Integumentary, Musculoskeletal, Psychiatric were all negative except for pertinent positives noted in my HPI.     PHYSICAL EXAM  Vitals:    09/22/17 1728   BP: (!) 136/91   BP Location: Right arm   Patient Position: Chair   Cuff Size: Adult Regular   Pulse: 59   Resp: 14   SpO2: 99%   Weight: 55.1 kg (121 lb 8 oz)   Height: 1.622 m (5' 3.86\")     Vital Signs: BP (!) 136/91 (BP Location: Right arm, Patient Position: Chair, Cuff Size: Adult Regular)  Pulse 59  Resp 14  Ht 1.622 m (5' 3.86\")  Wt 55.1 kg (121 lb 8 oz)  SpO2 99%  BMI 20.95 kg/m2 " Patient declined being weighed. Body mass index is 20.95 kg/(m^2).    General  - normal appearance, in no obvious distress  CV  - normal femoral pulse  Pulm  - normal respiratory pattern, non-labored  Musculoskeletal - right hip, low back  - stance: normal gait without limp, no obvious leg length discrepancy, normal heel and toe walk  - inspection: no swelling or effusion,  normal bone and joint alignment, no obvious deformity  - palpation: no lateral or anterior hip tenderness  - ROM: some pain with flexion and internal rotation of right low back, and has pain with extension in low back and flexion,  abduction, and adduction  - strength: 5/5 in all planes  - special tests:  (-) JESSA  (-) FADIR  Pain with SLR on right causes pain in right leg  no pain with axial femoral load  Neuro  - no sensory or motor deficit, grossly normal coordination, normal muscle tone  Skin  - no ecchymosis, erythema, warmth, or induration, no obvious rash  Psych  - interactive, appropriate, normal mood and affect    ASSESSMENT & PLAN  33 yo female with right hip pain due to hamstring tendinopathy and lumbar radicular pain  xrays lumbar spine reviewed: shows narrowing at L5/S1  Ordered MRI pelvis and lumbar spine at patient's request for further evaluation  Medrol dose pack  F/u by PHONE after MRIs to discuss further planning      Kunal Cantu MD, CAQSM

## 2017-09-26 ENCOUNTER — HOSPITAL ENCOUNTER (OUTPATIENT)
Dept: MRI IMAGING | Facility: CLINIC | Age: 32
End: 2017-09-26
Attending: PREVENTIVE MEDICINE
Payer: COMMERCIAL

## 2017-09-26 ENCOUNTER — HOSPITAL ENCOUNTER (OUTPATIENT)
Dept: MRI IMAGING | Facility: CLINIC | Age: 32
Discharge: HOME | End: 2017-09-26
Attending: PREVENTIVE MEDICINE | Admitting: PREVENTIVE MEDICINE
Payer: COMMERCIAL

## 2017-09-26 DIAGNOSIS — M54.16 PAIN, RADICULAR, LUMBAR: ICD-10-CM

## 2017-09-26 DIAGNOSIS — M76.899 HAMSTRING TENDINITIS: ICD-10-CM

## 2017-09-26 DIAGNOSIS — M54.5 LOW BACK PAIN, UNSPECIFIED BACK PAIN LATERALITY, UNSPECIFIED CHRONICITY, WITH SCIATICA PRESENCE UNSPECIFIED: ICD-10-CM

## 2017-09-26 PROCEDURE — 73721 MRI JNT OF LWR EXTRE W/O DYE: CPT | Mod: RT

## 2017-09-26 PROCEDURE — 72148 MRI LUMBAR SPINE W/O DYE: CPT

## 2017-09-26 PROCEDURE — 73718 MRI LOWER EXTREMITY W/O DYE: CPT | Mod: RT

## 2017-09-27 ENCOUNTER — ALLIED HEALTH/NURSE VISIT (OUTPATIENT)
Dept: INTERNAL MEDICINE | Facility: CLINIC | Age: 32
End: 2017-09-27

## 2017-09-27 DIAGNOSIS — Z71.3 DIETARY COUNSELING: Primary | ICD-10-CM

## 2017-09-27 DIAGNOSIS — F50.019 ANOREXIA NERVOSA, RESTRICTING TYPE: ICD-10-CM

## 2017-09-27 NOTE — MR AVS SNAPSHOT
After Visit Summary   9/27/2017    Maria De Jesus Hess    MRN: [id number]           Patient Information     Date Of Birth          1985        Visit Information        Provider Department      9/27/2017 1:00 PM Mary Song RD [facility]        Today's Diagnoses     Dietary counseling    -  1    Anorexia nervosa, restricting type           Follow-ups after your visit        Your next 10 appointments already scheduled     Oct 04, 2017  1:30 PM CDT   NUTRITION VISIT with CRISTINA Morales [facility] ([facility])    [address] [place] [id number] [phone]            Oct 11, 2017  1:00 PM CDT   NUTRITION VISIT with CRISTINA Morales [facility] ([facility])    [address] [place] [id number] [phone]            Oct 18, 2017  1:00 PM CDT   NUTRITION VISIT with CRISTINA Morales [facility] ([facility])    [address] [place] [id number] [phone]            Oct 25, 2017  1:00 PM CDT   NUTRITION VISIT with CRISTINA Morales [facility] ([facility])    [address] [place] [address] [phone]              Who to contact     Please call your clinic at [phone] to:    Ask questions about your health    Make or cancel appointments    Discuss your medicines    Learn about your test results    Speak to your doctor   If you have compliments or concerns about an experience at your clinic, or if you wish to file a complaint, please contact [facility] Patient Relations at [phone] or email us at [email or web address]         Additional Information About Your Visit        MyChart Information     [employer] gives you  "secure access to your electronic health record. If you see a primary care provider, you can also send messages to your care team and make appointments. If you have questions, please call your primary care clinic.  If you do not have a primary care provider, please call [phone] and they will assist you.      [employer] is an electronic gateway that provides easy, online access to your medical records. With [employer], you can request a clinic appointment, read your test results, renew a prescription or communicate with your care team.     To access your existing account, please contact your [facility] or call [phone] for assistance.        Care EveryWhere ID     This is your Care EveryWhere ID. This could be used by other organizations to access your [place] medical records  [id number]        Your Vitals Were     Height BMI (Body Mass Index)                1.622 m (5' 3.86\") 20.24 kg/m2           Blood Pressure from Last 3 Encounters:   09/22/17 (!) 136/91   08/23/17 116/80   02/02/17 138/87    Weight from Last 3 Encounters:   09/28/17 53.3 kg (117 lb 6.4 oz)   09/22/17 55.1 kg (121 lb 8 oz)   09/20/17 54 kg (119 lb)              We Performed the Following     MNT INDIVIDUAL F/U REASSESS, EA 15 MIN        Primary Care Provider Office Phone # Fax #    Majo Doherty [phone]       [address] [facility] [place] [address]        Equal Access to Services     CESAR SHI : Hadii aad ku hadasho Soomaali, waaxda luqadaha, qaybta kaalmada adeegyada, delgado idiin haybrandynn desi moreno . So [facility] [place] [phone].    ATENCIÓN: Si habla español, tiene a briscoe disposición servicios gratuitos de asistencia lingüística. Llame al [phone].    We comply with applicable federal civil rights laws and [place] laws. We do not discriminate on the basis of race, color, national origin, age, disability sex, sexual orientation or gender identity.            Thank " you!     Thank you for choosing [facility] AND [facility]  for your care. Our goal is always to provide you with excellent care. Hearing back from our patients is one way we can continue to improve our services. Please take a few minutes to complete the written survey that you may receive in the mail after your visit with us. Thank you!             Your Updated Medication List - Protect others around you: Learn how to safely use, store and throw away your medicines at www.disposemymeds.org.          This list is accurate as of: 9/27/17 11:59 PM.  Always use your most recent med list.                   Brand Name Dispense Instructions for use Diagnosis    methylPREDNISolone 4 MG tablet    MEDROL DOSEPAK    21 tablet    Follow package instructions    Low back pain, unspecified back pain laterality, unspecified chronicity, with sciatica presence unspecified, Pain, radicular, lumbar       TYLENOL PO      Take 1,000 mg by mouth every 8 hours as needed for mild pain or fever

## 2017-09-28 ENCOUNTER — TELEPHONE (OUTPATIENT)
Dept: ORTHOPEDICS | Facility: CLINIC | Age: 32
End: 2017-09-28

## 2017-09-28 VITALS — HEIGHT: 64 IN | WEIGHT: 117.4 LBS | BODY MASS INDEX: 20.04 KG/M2

## 2017-09-28 NOTE — PROGRESS NOTES
Maria De Jesus Hess  is a 31 year old female presents today for follow-up nutrition consultation.  She wanted to stop treatment again.  However, she is also realizing that she would be open to higher level care.  She is changing from not wanting to be in care to wanting to be in care.   She did some research on inpatient treatments fr eating disorder.   She also can not run.   She just received MRI to rule out stress fracture.     Vitals:  Wt Readings from Last 4 Encounters:   09/28/17 53.3 kg (117 lb 6.4 oz)   09/22/17 55.1 kg (121 lb 8 oz)   09/20/17 54 kg (119 lb)   09/13/17 53.7 kg (118 lb 6.4 oz)       Nutrition History  Patient is on a regular  diet at home.    1/2 banana  2 string cheeses  Bagel with PB and honey and peach  Greek yogurt and banana      Physical Activity  55 miles per week.      Time with Patient: 60 minutes    Nutrition  DX:.   1. Dietary counseling    2. Anorexia nervosa, restricting type      She has severe eating disorder.  Needs higher level care.  Coming to nutrition and therapy appointments is a struggle.  She knows that she needs more help.      Nutrition Goals:   1:  No checking weight.  2:  Follow this food intake (which is deficient, but more than what was reported)  Breakfast:  Banana and PB  Lunch:  Greek yogurt and RX bar  Dinner:  Salad that she typically has before last week.   See my notes from previous appointments.     Mary Song, MS, RD, CSSD, LD  [facility]

## 2017-09-28 NOTE — TELEPHONE ENCOUNTER
9-28-17 @ 816am patient called stating she saw Dr manzo 9-22-17 and had MRI scan on 9-26-17. Patients needs results today for her physical therapy appointment as she is planning on running the marathon this weekend.  Andria Alvarez, Admin Coord. II, Orthopaedics

## 2017-10-04 ENCOUNTER — ALLIED HEALTH/NURSE VISIT (OUTPATIENT)
Dept: INTERNAL MEDICINE | Facility: CLINIC | Age: 32
End: 2017-10-04

## 2017-10-04 DIAGNOSIS — F50.019 ANOREXIA NERVOSA, RESTRICTING TYPE: ICD-10-CM

## 2017-10-04 DIAGNOSIS — Z71.3 DIETARY COUNSELING: Primary | ICD-10-CM

## 2017-10-04 NOTE — PROGRESS NOTES
Maria De Jesus Hess  is a 31 year old female presents today for follow-up nutrition consultation.      Vitals:  Wt Readings from Last 4 Encounters:   09/28/17 53.3 kg (117 lb 6.4 oz)   09/22/17 55.1 kg (121 lb 8 oz)   09/20/17 54 kg (119 lb)   09/13/17 53.7 kg (118 lb 6.4 oz)       Nutrition History  Patient is on a regular  diet at home.    6:30: Banana  9:00: Vanilla latte  13:30: Fage 0% and trail mix  16:00: Two servings of trail mix and 32 oz Powerade Zero  19:00: Cheese (2 oz), spinach, cucumbers  20:00: Fage 0% and rx bar  21:00: Banana and 1 tbsp PB     States that she is fine. States she ran a marathon over the weekend. States she is not running currently, so she feels she does not need as many calories.     Physical Activity  55 miles per week.      Time with Patient: 60 minutes    Nutrition  DX:.   1. Dietary counseling    2. Anorexia nervosa, restricting type      She has severe eating disorder.  Needs higher level care.  Coming to nutrition and therapy appointments is a struggle.  She knows that she needs more help. If she does not follow through with the agreement (listed in goals below) she agrees to admit herself to higher level care.       Nutrition Goals:   1:  No checking weight.  2:  Follow this food intake (which is deficient, but more than what was reported)  Breakfast:  Banana and PB  Lunch:  Greek yogurt and RX bar  Dinner:  Salad that she typically has before last week.   See my notes from previous appointments.     Mary Song, MS, RD, CSSD, LD  [facility]

## 2017-10-04 NOTE — MR AVS SNAPSHOT
After Visit Summary   10/4/2017    Maria De Jesus Hess    MRN: [id number]           Patient Information     Date Of Birth          1985        Visit Information        Provider Department      10/4/2017 1:30 PM Mary Song RD [facility]        Today's Diagnoses     Dietary counseling    -  1    Anorexia nervosa, restricting type           Follow-ups after your visit        Your next 10 appointments already scheduled     Oct 11, 2017  1:00 PM CDT   NUTRITION VISIT with CRISTINA [facility] ([facility])    [address] [place] [id number] [phone]            Oct 18, 2017  1:00 PM CDT   NUTRITION VISIT with CRISTINA [facility] ([facility])    [address] [place] [phone]            Oct 25, 2017  1:00 PM CDT   NUTRITION VISIT with [facility]RISTINA [facility] ([facility])    [address] [place] [phone]            Nov 08, 2017  2:00 PM CST   NUTRITION VISIT with [email or web address] ([facility])    [address] [place] [phone] [phone]            Nov 15, 2017  1:00 PM CST   NUTRITION VISIT with [facility]RISTINA [facility] ([facility])    [address] [place] [phone]            Nov 22, 2017  1:00 PM CST   NUTRITION VISIT with [email or web address] ([facility])    [address] [phone]            Nov 29, 2017  1:00 PM CST   NUTRITION VISIT with [facility]ST[email or web address] ([facility])    [address] [facility]  5th Floor  [facility] [address] [phone]              Who to contact     Please call your clinic at [phone] to:    Ask questions about your health    Make or cancel appointments    Discuss your medicines    Learn about your test results    Speak to your doctor   If you have compliments or concerns about an experience at your clinic, or if you wish to file a complaint, please contact [facility] Patient Relations at [phone] or email us at [email or web address]         Additional Information About Your Visit        [employer] Information     [employer] gives you secure access to your electronic health record. If you see a primary care provider, you can also send messages to your care team and make appointments. If you have questions, please call your primary care clinic.  If you do not have a primary care provider, please call [phone] and they will assist you.      [employer] is an electronic gateway that provides easy, online access to your medical records. With [employer], you can request a clinic appointment, read your test results, renew a prescription or communicate with your care team.     To access your existing account, please contact your [facility] or call [phone] for assistance.        Care EveryWhere ID     This is your Care EveryWhere ID. This could be used by other organizations to access your [place] medical records  [id number]         Blood Pressure from Last 3 Encounters:   09/22/17 (!) 136/91   08/23/17 116/80   02/02/17 138/87    Weight from Last 3 Encounters:   09/28/17 53.3 kg (117 lb 6.4 oz)   09/22/17 55.1 kg (121 lb 8 oz)   09/20/17 54 kg (119 lb)              We Performed the Following     MNT INDIVIDUAL F/U REASSESS, EA 15 MIN        Primary Care Provider Office Phone # Fax #    Majo Doherty [phone]        [address] [facility] [address]        Equal Access to Services     CESAR SHI : Hadii aad ku hadamarivance Sojamie, wabusterda luqadaha, qaybta kasusanyenni weekskelseyyenni, waxay idiin haybrandynedouard tadeokarlyfiliberto dinh. So [facility] [phone].    ATENCIÓN: Si habla español, tiene a briscoe disposición servicios gratuitos de asistencia lingüística. Llame al [phone].    We comply with applicable federal civil rights laws and [place] laws. We do not discriminate on the basis of race, color, national origin, age, disability, sex, sexual orientation, or gender identity.            Thank you!     Thank you for choosing [facility]  for your care. Our goal is always to provide you with excellent care. Hearing back from our patients is one way we can continue to improve our services. Please take a few minutes to complete the written survey that you may receive in the mail after your visit with us. Thank you!             Your Updated Medication List - Protect others around you: Learn how to safely use, store and throw away your medicines at www.disposemymeds.org.          This list is accurate as of: 10/4/17  2:32 PM.  Always use your most recent med list.                   Brand Name Dispense Instructions for use Diagnosis    methylPREDNISolone 4 MG tablet    MEDROL DOSEPAK    21 tablet    Follow package instructions    Low back pain, unspecified back pain laterality, unspecified chronicity, with sciatica presence unspecified, Pain, radicular, lumbar       TYLENOL PO      Take 1,000 mg by mouth every 8 hours as needed for mild pain or fever

## 2017-10-11 ENCOUNTER — ALLIED HEALTH/NURSE VISIT (OUTPATIENT)
Dept: INTERNAL MEDICINE | Facility: CLINIC | Age: 32
End: 2017-10-11

## 2017-10-11 VITALS — WEIGHT: 121.25 LBS | BODY MASS INDEX: 20.7 KG/M2 | HEIGHT: 64 IN

## 2017-10-11 DIAGNOSIS — F50.019 ANOREXIA NERVOSA, RESTRICTING TYPE: ICD-10-CM

## 2017-10-11 DIAGNOSIS — Z71.3 DIETARY COUNSELING: Primary | ICD-10-CM

## 2017-10-12 NOTE — PROGRESS NOTES
Maria De Jesus Hess  is a 31 year old female presents today for follow-up nutrition consultation.   She provided verbal permission to talk to her PT.  In addition her friend who she signed CINDY called this week with many concerns about her health and functioning and her health.   She is reporting to take off about 1 week from running.        Vitals:  Wt Readings from Last 4 Encounters:   10/11/17 121 lb 4.1 oz (55 kg)   09/28/17 117 lb 6.4 oz (53.3 kg)   09/22/17 121 lb 8 oz (55.1 kg)   09/20/17 119 lb (54 kg)       Nutrition History  Patient is on a regular  diet at home.  8:40:  Kombucha  12:30:  2 string cheese, caramel Machiato from [employer]  15:30:  Greek yogurt  17:45:  2sl of bread, turkey, slice of cheese, lettuce, tomatoes, onions  19:30:  1c of greek yogurt, PB and banana    Physical Activity  Ran 20 miles per week    Time with Patient: 60 minutes    Nutrition  DX:.   1. Dietary counseling    2. Anorexia nervosa, restricting type       She did not follow through her goals.  She checked her weight as well as restricted.    She has severe eating disorder.  Needs higher level care.  Coming to nutrition and therapy appointments is a struggle.  She knows that she needs more help. If she does not follow through with the agreement (listed in goals below) she agrees to admit herself to higher level care.       Nutrition Goals:   1: Call to higher level care and make plans to take off from work for higher level care.   Discussed to make an appointment with MD. Called therapist to discuss the plan.          Mary Song, MS, RD, CSSD, LD  [facility]

## 2017-10-18 ENCOUNTER — ALLIED HEALTH/NURSE VISIT (OUTPATIENT)
Dept: INTERNAL MEDICINE | Facility: CLINIC | Age: 32
End: 2017-10-18

## 2017-10-18 DIAGNOSIS — Z71.3 DIETARY COUNSELING: Primary | ICD-10-CM

## 2017-10-18 DIAGNOSIS — F50.019 ANOREXIA NERVOSA, RESTRICTING TYPE: ICD-10-CM

## 2017-10-19 NOTE — PROGRESS NOTES
Maria De Jesus Hess  is a 31 year old female presents today for follow-up nutrition consultation.   She did make a call to Mary Jo Aguirre and Miriam.  She also reports some sweelling, not certain if it is emotional or true.  Concern about edema related to lack of nutrition.   She is also realizing that her running is connected with eating disorder, which is the first time I heard her say it.        Vitals:  Wt Readings from Last 4 Encounters:   10/11/17 55 kg (121 lb 4.1 oz)   09/28/17 53.3 kg (117 lb 6.4 oz)   09/22/17 55.1 kg (121 lb 8 oz)   09/20/17 54 kg (119 lb)       Nutrition History  Patient is on a regular  diet at home.  8:00:  Banana  13:30:  Apple and 2 string cheeses  17:45:  Very small amount of spinach, humus, and cheese.    19:40:  1c of Greek yogurt, 1 banana, 2 T of PB and 2T of potato starch.     This is an intake on a better day of food intake.  Restriction is more severe on other days.     Physical Activity  Ran 25 miles per week    Time with Patient: 60 minutes    Nutrition  DX:.   1. Dietary counseling    2. Anorexia nervosa, restricting type       She did not follow through her goals.  She checked her weight as well as restricted.    She has severe eating disorder.  Needs higher level care.  Coming to nutrition and therapy appointments is a struggle.      Nutrition Goals:   1: Call to higher level care and make plans to take off from work for higher level care.   Discussed to make an appointment with MD. Called therapist to discuss the plan.          Mary Song, MS, RD, CSSD, LD  [facility]

## 2017-10-25 ENCOUNTER — ALLIED HEALTH/NURSE VISIT (OUTPATIENT)
Dept: INTERNAL MEDICINE | Facility: CLINIC | Age: 32
End: 2017-10-25

## 2017-10-25 DIAGNOSIS — F50.019 ANOREXIA NERVOSA, RESTRICTING TYPE: ICD-10-CM

## 2017-10-25 DIAGNOSIS — Z71.3 DIETARY COUNSELING: Primary | ICD-10-CM

## 2017-10-25 NOTE — PROGRESS NOTES
Maria De Jesus Hess  is a 31 year old female presents today for follow-up nutrition consultation.   She [facility], [facility], one [facility]. Expressed my concerns about her eating disorder and minimizing the symptoms.         Vitals:  Wt Readings from Last 4 Encounters:   10/11/17 55 kg (121 lb 4.1 oz)   09/28/17 53.3 kg (117 lb 6.4 oz)   09/22/17 55.1 kg (121 lb 8 oz)   09/20/17 54 kg (119 lb)       Nutrition History  Patient is on a regular  diet at home.  13:55: Coffee  18:15:  Salad with mushrooms, cheese, oats, hummus  19:13:  Banana and PB    This is an intake on a better day of food intake.  Restriction is more severe on other days.     Physical Activity  Ran 25 miles per week    Time with Patient: 60 minutes    Nutrition  DX:.   1. Dietary counseling    2. Anorexia nervosa, restricting type       She did not follow through her goals.  She checked her weight as well as restricted.    She has severe eating disorder.  Needs higher level care.  Coming to nutrition and therapy appointments is a struggle.      Nutrition Goals:   1: Go through both intakes at [facility], and follow their recommendations.  2: 3 meals per day.       Mary Song, MS, RD, CSSD, LD   HEALTH

## 2017-10-25 NOTE — MR AVS SNAPSHOT
After Visit Summary   10/25/2017    Maria De Jesus Hess    MRN: [id number]           Patient Information     Date Of Birth          1985        Visit Information        Provider Department      10/25/2017 1:00 PM Mary Song RD [facility]        Today's Diagnoses     Dietary counseling    -  1    Anorexia nervosa, restricting type           Follow-ups after your visit        Your next 10 appointments already scheduled     Nov 01, 2017  2:20 PM CDT   (Arrive by 2:05 PM)   Return Visit with Ludy Betts MD   [facility] ([facility])    [address] [place] [phone]   [phone]            Nov 08, 2017  2:00 PM CST   NUTRITION VISIT with CRISTINA Morales [facility] ([facility])    [address] [place] [phone]            Nov 15, 2017  1:00 PM CST   NUTRITION VISIT with CRISTINA Morales [facility] ([facility])    [address] [place] [phone]            Nov 22, 2017  1:00 PM CST   NUTRITION VISIT with CRISTINA Morales [facility] ([facility])    [address] [place] [phone]            Nov 29, 2017  1:00 PM CST   NUTRITION VISIT with CRISTINA Morales [facility] ([facility])    [address] [place] [address] [id number] [phone]              Who to contact     Please call your clinic at [phone] to:    Ask questions about your health    Make or cancel appointments    Discuss your medicines    Learn about your test results    Speak to your doctor   If you have compliments or concerns about an experience at your clinic, or if you wish  to file a complaint, please contact [facility] Patient Relations at [phone] or email us at [email or web address]         Additional Information About Your Visit        [employer] Information     [employer] gives you secure access to your electronic health record. If you see a primary care provider, you can also send messages to your care team and make appointments. If you have questions, please call your primary care clinic.  If you do not have a primary care provider, please call [phone] and they will assist you.      [employer] is an electronic gateway that provides easy, online access to your medical records. With [employer], you can request a clinic appointment, read your test results, renew a prescription or communicate with your care team.     To access your existing account, please contact your [facility] or call [phone] for assistance.        Care EveryWhere ID     This is your Care EveryWhere ID. This could be used by other organizations to access your [place] medical records  [id number]         Blood Pressure from Last 3 Encounters:   09/22/17 (!) 136/91   08/23/17 116/80   02/02/17 138/87    Weight from Last 3 Encounters:   10/11/17 55 kg (121 lb 4.1 oz)   09/28/17 53.3 kg (117 lb 6.4 oz)   09/22/17 55.1 kg (121 lb 8 oz)              We Performed the Following     MNT INDIVIDUAL F/U REASSESS, EA 15 MIN        Primary Care Provider Office Phone # Fax #    Majo Doherty [phone]        [facility] [address]        Equal Access to Services     CESAR SHI : Hadii aad ku hadasho Soomaali, waaxda luqadaha, qaybta kaalmada adeegyada, delgado dinh. So [facility] [phone].    ATENCIÓN: Si habla español, tiene a briscoe disposición servicios gratuitos de asistencia lingüística. Llame al [phone].    We comply with applicable federal civil rights laws and [place]  laws. We do not discriminate on the basis of race, color, national origin, age, disability, sex, sexual orientation, or gender identity.            Thank you!     Thank you for choosing [facility]  for your care. Our goal is always to provide you with excellent care. Hearing back from our patients is one way we can continue to improve our services. Please take a few minutes to complete the written survey that you may receive in the mail after your visit with us. Thank you!             Your Updated Medication List - Protect others around you: Learn how to safely use, store and throw away your medicines at www.disposemymeds.org.          This list is accurate as of: 10/25/17 11:59 PM.  Always use your most recent med list.                   Brand Name Dispense Instructions for use Diagnosis    methylPREDNISolone 4 MG tablet    MEDROL DOSEPAK    21 tablet    Follow package instructions    Low back pain, unspecified back pain laterality, unspecified chronicity, with sciatica presence unspecified, Pain, radicular, lumbar       TYLENOL PO      Take 1,000 mg by mouth every 8 hours as needed for mild pain or fever

## 2017-11-01 ENCOUNTER — OFFICE VISIT (OUTPATIENT)
Dept: ORTHOPEDICS | Facility: CLINIC | Age: 32
End: 2017-11-01

## 2017-11-01 DIAGNOSIS — F50.9 EATING DISORDER: Primary | ICD-10-CM

## 2017-11-01 DIAGNOSIS — F50.9 EATING DISORDER: ICD-10-CM

## 2017-11-01 DIAGNOSIS — M53.3 SACRAL PAIN: ICD-10-CM

## 2017-11-01 LAB
ALBUMIN SERPL-MCNC: 4.3 G/DL (ref 3.4–5)
ALP SERPL-CCNC: 62 U/L (ref 40–150)
ALT SERPL W P-5'-P-CCNC: 42 U/L (ref 0–50)
ANION GAP SERPL CALCULATED.3IONS-SCNC: 9 MMOL/L (ref 3–14)
AST SERPL W P-5'-P-CCNC: 32 U/L (ref 0–45)
BASOPHILS # BLD AUTO: 0 10E9/L (ref 0–0.2)
BASOPHILS NFR BLD AUTO: 0.5 %
BILIRUB DIRECT SERPL-MCNC: 0.2 MG/DL (ref 0–0.2)
BILIRUB SERPL-MCNC: 1.2 MG/DL (ref 0.2–1.3)
BUN SERPL-MCNC: 11 MG/DL (ref 7–30)
CALCIUM SERPL-MCNC: 8.9 MG/DL (ref 8.5–10.1)
CHLORIDE SERPL-SCNC: 103 MMOL/L (ref 94–109)
CO2 SERPL-SCNC: 25 MMOL/L (ref 20–32)
CREAT SERPL-MCNC: 0.79 MG/DL (ref 0.52–1.04)
DIFFERENTIAL METHOD BLD: NORMAL
EOSINOPHIL # BLD AUTO: 0 10E9/L (ref 0–0.7)
EOSINOPHIL NFR BLD AUTO: 0.5 %
ERYTHROCYTE [DISTWIDTH] IN BLOOD BY AUTOMATED COUNT: 11.6 % (ref 10–15)
ESTRADIOL SERPL-MCNC: 171 PG/ML
FERRITIN SERPL-MCNC: 57 NG/ML (ref 12–150)
FSH SERPL-ACNC: 7.8 IU/L
GFR SERPL CREATININE-BSD FRML MDRD: 83 ML/MIN/1.7M2
GLUCOSE SERPL-MCNC: 73 MG/DL (ref 70–99)
HCT VFR BLD AUTO: 44.4 % (ref 35–47)
HGB BLD-MCNC: 15.1 G/DL (ref 11.7–15.7)
IMM GRANULOCYTES # BLD: 0 10E9/L (ref 0–0.4)
IMM GRANULOCYTES NFR BLD: 0.3 %
LH SERPL-ACNC: 7.9 IU/L
LYMPHOCYTES # BLD AUTO: 1.7 10E9/L (ref 0.8–5.3)
LYMPHOCYTES NFR BLD AUTO: 28 %
MCH RBC QN AUTO: 32.5 PG (ref 26.5–33)
MCHC RBC AUTO-ENTMCNC: 34 G/DL (ref 31.5–36.5)
MCV RBC AUTO: 96 FL (ref 78–100)
MONOCYTES # BLD AUTO: 0.4 10E9/L (ref 0–1.3)
MONOCYTES NFR BLD AUTO: 7 %
NEUTROPHILS # BLD AUTO: 3.9 10E9/L (ref 1.6–8.3)
NEUTROPHILS NFR BLD AUTO: 63.7 %
NRBC # BLD AUTO: 0 10*3/UL
NRBC BLD AUTO-RTO: 0 /100
PLATELET # BLD AUTO: 224 10E9/L (ref 150–450)
POTASSIUM SERPL-SCNC: 3.9 MMOL/L (ref 3.4–5.3)
PROT SERPL-MCNC: 7.6 G/DL (ref 6.8–8.8)
RBC # BLD AUTO: 4.64 10E12/L (ref 3.8–5.2)
SODIUM SERPL-SCNC: 137 MMOL/L (ref 133–144)
T3FREE SERPL-MCNC: 2 PG/ML (ref 2.3–4.2)
T4 FREE SERPL-MCNC: 0.98 NG/DL (ref 0.76–1.46)
TSH SERPL DL<=0.005 MIU/L-ACNC: 0.45 MU/L (ref 0.4–4)
WBC # BLD AUTO: 6.1 10E9/L (ref 4–11)

## 2017-11-01 NOTE — MR AVS SNAPSHOT
After Visit Summary   11/1/2017    Maria De Jesus Hess    MRN: [id number]           Patient Information     Date Of Birth          1985        Visit Information        Provider Department      11/1/2017 2:20 PM Ludy Betts MD [facility]        Today's Diagnoses     Eating disorder    -  1    Sacral pain           Follow-ups after your visit        Your next 10 appointments already scheduled     Nov 29, 2017  1:00 PM CST   NUTRITION VISIT with Mary Song RD   [facility] ([facility] [place])    [address] [facility]  5th [facility] [phone] [phone]            Nov 30, 2017  5:00 PM CST   MR PELVIS W/O CONTRAST with UUMR2   [facility] [place], MRI ([facility])    [address] [facility] [phone]           Take your medicines as usual, unless your doctor tells you not to. Bring a list of your current medicines to your exam (including vitamins, minerals and over-the-counter drugs). Also bring the results of similar scans you may have had.  Please remove any body piercings and hair extensions before you arrive.  Follow your doctor s orders. If you do not, we may have to postpone your exam.  You will not have contrast for this exam. You do not need to do anything special to prepare.  The MRI machine uses a strong magnet. Please wear clothes without metal (snaps, zippers). A sweatsuit works well, or we may give you a hospital gown.   **IMPORTANT** THE INSTRUCTIONS BELOW ARE ONLY FOR THOSE PATIENTS WHO HAVE BEEN TOLD THEY WILL RECEIVE SEDATION OR GENERAL ANESTHESIA DURING THEIR MRI PROCEDURE:  IF YOU WILL RECEIVE SEDATION (take medicine to help you relax during your exam):   You must get the medicine from your doctor before you arrive. Bring the medicine to the exam. Do not take it at home.   Arrive one hour early. Bring someone who can  take you home after the test. Your medicine will make you sleepy. After the exam, you may not drive, take a bus or take a taxi by yourself.   No eating 8 hours before your exam. You may have clear liquids up until 4 hours before your exam. (Clear liquids include water, clear tea, black coffee and fruit juice without pulp.)  IF YOU WILL RECEIVE ANESTHESIA (be asleep for your exam):   Arrive 1 1/2 hours early. Bring someone who can take you home after the test. You may not drive, take a bus or take a taxi by yourself.   No eating 8 hours before your exam. You may have clear liquids up until 4 hours before your exam. (Clear liquids include water, clear tea, black coffee and fruit juice without pulp.)   You will spend four to five hours in the recovery room.  Please call the Imaging Department at your exam site with any questions.            Dec 07, 2017  1:30 PM CST   NUTRITION VISIT with CRISTINA Morales [facility] ([facility])    [address] [place] [address] [id number] [phone]            Dec 13, 2017  1:00 PM CST   NUTRITION VISIT with CRISTINA Morales [facility] ([facility])    [address] [place] [phone]            Dec 21, 2017  1:30 PM CST   NUTRITION VISIT with CRISTINA Morales [facility] ([facility])    [address] [place] [phone]            Dec 27, 2017  2:30 PM CST   NUTRITION VISIT with CRISTINA Morales [facility] ([facility])    [address] [place] [address] [phone]              Who to contact     Please call your clinic at [phone] to:    Ask questions about your health    Make or cancel appointments    Discuss your medicines    Learn about  your test results    Speak to your doctor   If you have compliments or concerns about an experience at your clinic, or if you wish to file a complaint, please contact [facility] Patient Relations at [phone] or email us at [email or web address]         Additional Information About Your Visit        [employer] Information     [employer] gives you secure access to your electronic health record. If you see a primary care provider, you can also send messages to your care team and make appointments. If you have questions, please call your primary care clinic.  If you do not have a primary care provider, please call [phone] and they will assist you.      [employer] is an electronic gateway that provides easy, online access to your medical records. With [employer], you can request a clinic appointment, read your test results, renew a prescription or communicate with your care team.     To access your existing account, please contact your [facility] or call [phone] for assistance.        Care EveryWhere ID     This is your Care EveryWhere ID. This could be used by other organizations to access your [place] medical records  [id number]         Blood Pressure from Last 3 Encounters:   09/22/17 (!) 136/91   08/23/17 116/80   02/02/17 138/87    Weight from Last 3 Encounters:   11/08/17 119 lb 3.2 oz (54.1 kg)   10/11/17 121 lb 4.1 oz (55 kg)   09/28/17 117 lb 6.4 oz (53.3 kg)               Primary Care Provider Office Phone # Fax #    Majo Doherty [phone]       No primary provider on file.        Equal Access to Services     CESAR SHI : Hadii lalo Bonilla, wabusterda sharan, qaybta joonaldelgado sanchez. So [facility] [phone].    ATENCIÓN: Si habla español, tiene a briscoe disposición servicios gratuitos de asistencia lingüística. Llame al [phone].    We comply with  applicable federal civil rights laws and [place] laws. We do not discriminate on the basis of race, color, national origin, age, disability, sex, sexual orientation, or gender identity.            Thank you!     Thank you for choosing [facility]  for your care. Our goal is always to provide you with excellent care. Hearing back from our patients is one way we can continue to improve our services. Please take a few minutes to complete the written survey that you may receive in the mail after your visit with us. Thank you!             Your Updated Medication List - Protect others around you: Learn how to safely use, store and throw away your medicines at www.disposemymeds.org.          This list is accurate as of: 11/1/17 11:59 PM.  Always use your most recent med list.                   Brand Name Dispense Instructions for use Diagnosis    methylPREDNISolone 4 MG tablet    MEDROL DOSEPAK    21 tablet    Follow package instructions    Low back pain, unspecified back pain laterality, unspecified chronicity, with sciatica presence unspecified, Pain, radicular, lumbar       TYLENOL PO      Take 1,000 mg by mouth every 8 hours as needed for mild pain or fever

## 2017-11-01 NOTE — PROGRESS NOTES
Subjective:   Maria De Jesus Hess is a 32 year old female with ED-NOS who is here to review her eating disorder treatment progress and she also is having continued pain in the R sacrum with running and some with ambulation.  This pain started prior to the [employer] [place] which was the first week in Oct.  It started in early Sept. Her PT , Brittani, was worried about a sacral stress fracture.   She was seen in [facility] by Dr. Cantu and MRIs of the lumbar spine, hip and R thigh were ordered.  The patient reports a h/o of chronic proximal hamstring issues but this pain was different than that pain.  The hamstring pain has not been too much of an issues for her while preparing for the marathon.  The patient reports feeling frustrated that a MRI of the pelvis was not ordered.  She ended up running the marathon but it did not go well due to pain.  She decreased her running some since that time but continues to have pain.     -Running 4-5 miles per day with 1 day off per week and also doing strength training.   -Intensive outpatient at [facility] should be starting soon.  Still seeing Mary Caceres and a Psychologist who both feel that she needs the more intensive eating disorder treatment.   -ALLERGIES: She is allergic to sulfa drugs.    CURRENT MEDICATIONS: None  Take Vit C, iron supplement 3-4 times per week.         Exam:   NAD  There were no vitals taken for this visit.      Lumbar;  Nttp, from, neg SLR testing  Pelvis:  +ttp over the R sacrum but not on the SI joint but just medial to it.   Hips; from and nttp  SI joint testing;  Pain with sacral thrust, neg posterior thigh thrust, neg FABERs and FADIRs. Pain with R illaic compression testing.  Neg distriaction testing.     Mild ttp over the hamstring origin on the RIGHT, hamstring strength 5-/5 with mild discomfort but weaker with isolating the medial hamstrings 4/5 on the RIGHT.     Pain with single leg hop     Study Result    Exam: MRI of the right hip and right lower extremity dated 9/26/2017.     COMPARISON: None.     CLINICAL HISTORY: Chronic right hamstring injury.     TECHNIQUE: Multiplanar, multisequence MR imaging of the right  hip/right thigh was obtained using standard sequences in 3 orthogonal  planes without the use of intravenous or intra-articular gadolinium  contrast.     FINDINGS:      No significant fluid in the right hip joint.     No marrow signal abnormalities in the right hip or right femur to  suggest fracture, osteonecrosis, or marrow infiltration.     The muscle bulk is intact without significant atrophy.     Small amount of free fluid within the pelvis.      There is a high-grade to full-thickness tear of the right hamstring  tendons at the origin of the ischial tuberosity, involving all 3  tendons. Retraction of the torn tendons by approximately 1.5 cm.  Irregularity at the ischial tuberosity. Surrounding soft tissue edema.     There is insertional tendinopathy of the gluteus minimus and gluteus  medius tendons, with soft tissue edema in the region of the  trochanteric bursa, surrounding the gluteus medius muscle. No other  full-thickness tear or tendon retraction. Sciatic nerve is  unremarkable. No lymphadenopathy.     No evidence of full-thickness cartilage loss or subchondral edema.  Blunting of the anterior superior labrum.     Minimal fluid in the right knee joint.         IMPRESSION:  1. High-grade to full-thickness complete tear of the right hamstring  tendons at the origin on the ischial tuberosity. Retraction of the  torn tendons by approximately 1.5 cm. Surrounding soft tissue edema  and bony irregularity in the ischial tuberosity.  2. No marrow signal abnormalities to suggest fracture, osteonecrosis,  or marrow infiltration.  3. No significant right hip joint effusion. Minimal fluid in the right  knee joint.  4. Mild insertional tendinopathy of the gluteus minimus and gluteus  medius, with minimal  fluid in the region of the trochanteric bursa.     RUPALI MANDUJANO MD     MR LUMBAR SPINE W/O CONTRAST 9/26/2017 5:15 PM     History: Low back pain and right hip pain     Comparison: Lumbar spine radiographs 9/22/2017     Technique: Sagittal STIR, diffusion weighted, T1-weighted, 3-D  volumetric T2-weighted and axial reconstructed T2-weighted images of  the lumbar spine were obtained without intravenous contrast.      Findings: In keeping with the convention used on the prior lumbar  spine radiographs, there are 5 lumbar-type vertebrae with accessory L1  ribs and lumbarization of S1. The tip of the conus medullaris is at  L2.  The lumbar vertebral column appears normally aligned.  Intervertebral disc heights are maintained. Small incidental  hemangioma in the L3 vertebral body.     On a level by level basis:     L1-2: No spinal canal or neural foraminal stenosis.     L2-3: No spinal canal or neural foraminal stenosis.     L3-4: No spinal canal or neural foraminal stenosis.     L4-5: Mild facet hypertrophy without spinal canal or neural foraminal  stenosis.     L5-S1: Bilateral facet hypertrophy and ligamentum flavum thickening.  Posterior annular fissuring. No spinal canal or neural foraminal  stenosis.         Impression:  1. In keeping with the previous numbering convention, 5 lumbar-type  vertebrae are assumed with lumbarization of S1.  2. Mild lumbar spondylosis without spinal canal or neural foraminal  stenosis.     I have personally reviewed the examination and initial interpretation  and I agree with the findings.     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    PLAN:  Repeat lab testing today.    I have reviewed all of her MRI testing done in late Sept. I could not really see much of her sacrum on any of these images.    I have recommended a MRI of her pelvis to assess  for a sacral bone stress injury since she continues to have persistent sacral pain with running despite decreasing her running volumes.  I have recommended no running until she has the imaging.  She is nervous to have it and wants to wait a couple of weeks and not run and see how she feels.  She agrees to get it if not greatly improved. She continues to work with a PT as well, who is also concerned. RTC to review the MRI and lab results.         Ludy Betts MD, CAQ, FACSM, CCD  [facility]  Sports Medicine and Bone Health  Team Physician;  Athletics

## 2017-11-01 NOTE — LETTER
11/1/2017      RE: Maria De Jesus Hess  [address]  [place] [facility] [address]        Subjective:   Maria De Jesus Hess is a 32 year old female with ED-NOS who is here to review her eating disorder treatment progress and she also is having continued pain in the R sacrum with running and some with ambulation.  This pain started prior to the [employer] [facility] which was the first week in Oct.  It started in early Sept. Her PT , Brittani, was worried about a sacral stress fracture.   She was [facility] by Dr. Cantu and MRIs of the lumbar spine, hip and R thigh were ordered.  The patient reports a h/o of chronic proximal hamstring issues but this pain was different than that pain.  The hamstring pain has not been too much of an issues for her while preparing for the marathon.  The patient reports feeling frustrated that a MRI of the pelvis was not ordered.  She ended up running the marathon but it did not go well due to pain.  She decreased her running some since that time but continues to have pain.     -Running 4-5 miles per day with 1 day off per week and also doing strength training.   -Intensive outpatient at [facility] should be starting soon.  Still seeing Mary Caceres and a Psychologist who both feel that she needs the more intensive eating disorder treatment.   -ALLERGIES: She is allergic to sulfa drugs.    CURRENT MEDICATIONS: None  Take Vit C, iron supplement 3-4 times per week.         Exam:   NAD  There were no vitals taken for this visit.      Lumbar;  Nttp, from, neg SLR testing  Pelvis:  +ttp over the R sacrum but not on the SI joint but just medial to it.   Hips; from and nttp  SI joint testing;  Pain with sacral thrust, neg posterior thigh thrust, neg FABERs and FADIRs. Pain with R illaic compression testing.  Neg distriaction testing.     Mild ttp over the hamstring origin on the RIGHT, hamstring strength 5-/5 with mild discomfort but weaker with isolating  the medial hamstrings 4/5 on the RIGHT.     Pain with single leg hop     Study Result   Exam: MRI of the right hip and right lower extremity dated 9/26/2017.     COMPARISON: None.     CLINICAL HISTORY: Chronic right hamstring injury.     TECHNIQUE: Multiplanar, multisequence MR imaging of the right  hip/right thigh was obtained using standard sequences in 3 orthogonal  planes without the use of intravenous or intra-articular gadolinium  contrast.     FINDINGS:      No significant fluid in the right hip joint.     No marrow signal abnormalities in the right hip or right femur to  suggest fracture, osteonecrosis, or marrow infiltration.     The muscle bulk is intact without significant atrophy.     Small amount of free fluid within the pelvis.      There is a high-grade to full-thickness tear of the right hamstring  tendons at the origin of the ischial tuberosity, involving all 3  tendons. Retraction of the torn tendons by approximately 1.5 cm.  Irregularity at the ischial tuberosity. Surrounding soft tissue edema.     There is insertional tendinopathy of the gluteus minimus and gluteus  medius tendons, with soft tissue edema in the region of the  trochanteric bursa, surrounding the gluteus medius muscle. No other  full-thickness tear or tendon retraction. Sciatic nerve is  unremarkable. No lymphadenopathy.     No evidence of full-thickness cartilage loss or subchondral edema.  Blunting of the anterior superior labrum.     Minimal fluid in the right knee joint.         IMPRESSION:  1. High-grade to full-thickness complete tear of the right hamstring  tendons at the origin on the ischial tuberosity. Retraction of the  torn tendons by approximately 1.5 cm. Surrounding soft tissue edema  and bony irregularity in the ischial tuberosity.  2. No marrow signal abnormalities to suggest fracture, osteonecrosis,  or marrow infiltration.  3. No significant right hip joint effusion. Minimal fluid in the right  knee joint.  4.  Mild insertional tendinopathy of the gluteus minimus and gluteus  medius, with minimal fluid in the region of the trochanteric bursa.     RUPALI MANDUJANO MD     MR LUMBAR SPINE W/O CONTRAST 9/26/2017 5:15 PM     History: Low back pain and right hip pain     Comparison: Lumbar spine radiographs 9/22/2017     Technique: Sagittal STIR, diffusion weighted, T1-weighted, 3-D  volumetric T2-weighted and axial reconstructed T2-weighted images of  the lumbar spine were obtained without intravenous contrast.      Findings: In keeping with the convention used on the prior lumbar  spine radiographs, there are 5 lumbar-type vertebrae with accessory L1  ribs and lumbarization of S1. The tip of the conus medullaris is at  L2.  The lumbar vertebral column appears normally aligned.  Intervertebral disc heights are maintained. Small incidental  hemangioma in the L3 vertebral body.     On a level by level basis:     L1-2: No spinal canal or neural foraminal stenosis.     L2-3: No spinal canal or neural foraminal stenosis.     L3-4: No spinal canal or neural foraminal stenosis.     L4-5: Mild facet hypertrophy without spinal canal or neural foraminal  stenosis.     L5-S1: Bilateral facet hypertrophy and ligamentum flavum thickening.  Posterior annular fissuring. No spinal canal or neural foraminal  stenosis.         Impression:  1. In keeping with the previous numbering convention, 5 lumbar-type  vertebrae are assumed with lumbarization of S1.  2. Mild lumbar spondylosis without spinal canal or neural foraminal  stenosis.     I have personally reviewed the examination and initial interpretation  and I agree with the findings.     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    PLAN:  Repeat lab testing today.    I have reviewed all of her MRI testing done in late Sept. I could not really see much of  her sacrum on any of these images.    I have recommended a MRI of her pelvis to assess for a sacral bone stress injury since she continues to have persistent sacral pain with running despite decreasing her running volumes.  I have recommended no running until she has the imaging.  She is nervous to have it and wants to wait a couple of weeks and not run and see how she feels.  She agrees to get it if not greatly improved. She continues to work with a PT as well, who is also concerned. RTC to review the MRI and lab results.         Ludy Betts MD, CAQ, FACSM, CCD  [facility]  Sports Medicine and Bone Health  Team Physician;  Athletics

## 2017-11-02 LAB — DEPRECATED CALCIDIOL+CALCIFEROL SERPL-MC: 33 UG/L (ref 20–75)

## 2017-11-08 ENCOUNTER — ALLIED HEALTH/NURSE VISIT (OUTPATIENT)
Dept: INTERNAL MEDICINE | Facility: CLINIC | Age: 32
End: 2017-11-08

## 2017-11-08 VITALS — BODY MASS INDEX: 20.35 KG/M2 | HEIGHT: 64 IN | WEIGHT: 119.2 LBS

## 2017-11-08 DIAGNOSIS — F50.9 EATING DISORDER: ICD-10-CM

## 2017-11-08 DIAGNOSIS — Z71.3 DIETARY COUNSELING: Primary | ICD-10-CM

## 2017-11-08 NOTE — MR AVS SNAPSHOT
After Visit Summary   11/8/2017    Maria De Jesus Hess    MRN: [id number]           Patient Information     Date Of Birth          1985        Visit Information        Provider Department      11/8/2017 2:00 PM Mary Song [facility]        Today's Diagnoses     Dietary counseling    -  1    Eating disorder           Follow-ups after your visit        Your next 10 appointments already scheduled     Nov 15, 2017  1:00 PM CST   NUTRITION VISIT with [facility] ([facility])    [address] [id number] [phone]            Nov 22, 2017  1:00 PM CST   NUTRITION VISIT with [facility] ([facility])    [address] [place] [id number] [phone]            Nov 29, 2017  1:00 PM CST   NUTRITION VISIT with [facility] ([facility])    [address] [id number] [phone]            Dec 07, 2017  1:30 PM CST   NUTRITION VISIT with [facility] ([facility] [place] [facility])    [address] [id number] [phone]            Dec 13, 2017  1:00 PM CST   NUTRITION VISIT with [facility] ([facility])    [address] [id number] [phone]            Dec 21, 2017  1:30 PM CST   NUTRITION VISIT with [facility] ([facility] [place])    [address] [id number] [phone]            Dec 27, 2017  2:30 PM CST   NUTRITION VISIT with [facility] ([facility]  [facility])    [address] [facility] [address]  [address]th Floor  [facility] [address] [phone]              Who to contact     Please call your clinic at [phone] to:    Ask questions about your health    Make or cancel appointments    Discuss your medicines    Learn about your test results    Speak to your doctor   If you have compliments or concerns about an experience at your clinic, or if you wish to file a complaint, please contact [facility] Patient Relations at [phone] or email us at [email or web address]         Additional Information About Your Visit        [email or web address] Information     [email or web address] gives you secure access to your electronic health record. If you see a primary care provider, you can also send messages to your care team and make appointments. If you have questions, please call your primary care clinic.  If you do not have a primary care provider, please call [phone] and they will assist you.      [email or web address] is an electronic gateway that provides easy, online access to your medical records. With [email or web address], you can request a clinic appointment, read your test results, renew a prescription or communicate with your care team.     To access your existing account, please contact your [facility] or call [phone] for assistance.        Care EveryWhere ID     This is your Care EveryWhere ID. This could be used by other organizations to access your [place] medical records  [id number]        Your Vitals Were     Height BMI (Body Mass Index)                5' 3.86\" (1.622 m) 20.55 kg/m2           Blood Pressure from Last 3 Encounters:   09/22/17 (!) 136/91   08/23/17 116/80   02/02/17 138/87    Weight from Last 3 Encounters:   11/08/17 119 lb 3.2 oz (54.1 kg)   10/11/17 121 lb 4.1 oz (55 kg)   09/28/17 117 lb 6.4 oz (53.3 kg)              We Performed the Following     MNT INDIVIDUAL F/U REASSESS, EA 15 MIN        Primary " Care Provider Office Phone # Fax #    Majo Doherty [phone]        [facility] [address]        Equal Access to Services     CSEAR SHI : Hadii lalo ku hadamario Sokailashali, waaxda luqadaha, qaybta kaalmada adeferda, delgado teague laPedro Pablojolynn dinh. So [facility] [phone].    ATENCIÓN: Si habla español, tiene a briscoe disposición servicios gratuitos de asistencia lingüística. Llame al [phone].    We comply with applicable federal civil rights laws and [place] laws. We do not discriminate on the basis of race, color, national origin, age, disability, sex, sexual orientation, or gender identity.            Thank you!     Thank you for choosing [facility]  for your care. Our goal is always to provide you with excellent care. Hearing back from our patients is one way we can continue to improve our services. Please take a few minutes to complete the written survey that you may receive in the mail after your visit with us. Thank you!             Your Updated Medication List - Protect others around you: Learn how to safely use, store and throw away your medicines at www.disposemymeds.org.          This list is accurate as of: 11/8/17  3:51 PM.  Always use your most recent med list.                   Brand Name Dispense Instructions for use Diagnosis    methylPREDNISolone 4 MG tablet    MEDROL DOSEPAK    21 tablet    Follow package instructions    Low back pain, unspecified back pain laterality, unspecified chronicity, with sciatica presence unspecified, Pain, radicular, lumbar       TYLENOL PO      Take 1,000 mg by mouth every 8 hours as needed for mild pain or fever

## 2017-11-08 NOTE — PROGRESS NOTES
Maria De Jesus Hess  is a 31 year old female presents today for follow-up nutrition consultation.   She started at the [facility], but having really hard time with the program.  Did not finish meals and had to be supplemented.     She has meal plan as well.    Indicated that potatoes, milk is preference not eating disorder. Will continue helping her.        Vitals:  Wt Readings from Last 4 Encounters:   11/08/17 119 lb 3.2 oz (54.1 kg)   10/11/17 121 lb 4.1 oz (55 kg)   09/28/17 117 lb 6.4 oz (53.3 kg)   09/22/17 121 lb 8 oz (55.1 kg)       Nutrition History  Patient is on a regular  diet at home.  9:45: Caramel Machiato  14:00: Greek yogurt  18:15:  Chicken, Theodore sprouts, 16 oz of Ensure       Physical Activity  Ran 25 miles per week    Time with Patient: 60 minutes    Nutrition  DX:.   1. Dietary counseling    2. Eating disorder           Nutrition Goals:   1: Start having banana and PB for breakfast and greek yogurt for lunch  2:  Follow meal plan at the [facility].      Will call [facility] to communicate.     Mary Song, MS, RD, CSSD, LD  [facility]

## 2017-11-15 ENCOUNTER — ALLIED HEALTH/NURSE VISIT (OUTPATIENT)
Dept: INTERNAL MEDICINE | Facility: CLINIC | Age: 32
End: 2017-11-15

## 2017-11-15 DIAGNOSIS — R63.0 ANOREXIA: ICD-10-CM

## 2017-11-15 DIAGNOSIS — Z71.3 DIETARY COUNSELING: Primary | ICD-10-CM

## 2017-11-15 NOTE — PROGRESS NOTES
Maria De Jesus Hess  is a 31 year old female presents today for follow-up nutrition consultation.   Her insurance approved only 1 week in IOP at the [facility].  However, her team is recommending higher level care (residential).  Will continue to communicate with [facility].    Releases are signed at the [facility] for us to talk.     Indicated that potatoes, milk is preference not eating disorder. Will continue helping her.  Will need to explore more eating disorder versus preference.        Vitals:  Wt Readings from Last 4 Encounters:   11/08/17 119 lb 3.2 oz (54.1 kg)   10/11/17 121 lb 4.1 oz (55 kg)   09/28/17 117 lb 6.4 oz (53.3 kg)   09/22/17 121 lb 8 oz (55.1 kg)       Nutrition History  Patient is on a regular  diet at home.  9:45: Apple  14:00: Caramel Machiato  18:15:  Chicken, 16 oz of Ensure     Physical Activity  Ran 30 miles last week    Time with Patient: 60 minutes    Nutrition  DX:.   1. Dietary counseling    2. Anorexia           Nutrition Goals:   1: Every morning she will have 2 food groups.   2: Follow through with recommendation at the [facility].       Mary Song, MS, RD, CSSD, LD  [facility]

## 2017-11-15 NOTE — MR AVS SNAPSHOT
After Visit Summary   11/15/2017    Maria De Jesus Hess    MRN: [id number]           Patient Information     Date Of Birth          1985        Visit Information        Provider Department      11/15/2017 1:00 PM Mary Song RD [facility]        Today's Diagnoses     Dietary counseling    -  1    Anorexia           Follow-ups after your visit        Your next 10 appointments already scheduled     Nov 22, 2017  1:00 PM CST   NUTRITION VISIT with CRISTINA [facility] ([facility])    [address] [place] [id number] [phone]            Nov 29, 2017  1:00 PM CST   NUTRITION VISIT with CRISTINA [facility]ales [facility] ([facility])    [address] [place] [address] [id number] [phone]            Dec 07, 2017  1:30 PM CST   NUTRITION VISIT with CRISTINA [facility] ([facility])    [address] [place] [address] [id number] [phone]            Dec 13, 2017  1:00 PM CST   NUTRITION VISIT with CRISTINA [facility] ([facility])    [address] [place] [address] [id number] [phone]            Dec 21, 2017  1:30 PM CST   NUTRITION VISIT with CRISTINA [facility] ([facility])    [address] [place] [address] [phone] [phone]            Dec 27, 2017  2:30 PM CST   NUTRITION VISIT with [facility]RI[facility]INA [facility] ([facility])    [address] [place] [address] [id number] [phone]              Who to contact     Please call your clinic at [phone] to:    Ask questions about your health    Make or cancel  appointments    Discuss your medicines    Learn about your test results    Speak to your doctor   If you have compliments or concerns about an experience at your clinic, or if you wish to file a complaint, please contact [facility] Patient Relations at [phone] or email us at [email or web address]         Additional Information About Your Visit        KannaLife Scienceshart Information     Kanna[employer] Scienceshart gives you secure access to your electronic health record. If you see a primary care provider, you can also send messages to your care team and make appointments. If you have questions, please call your primary care clinic.  If you do not have a primary care provider, please call [phone] and they will assist you.      Invidio is an electronic gateway that provides easy, online access to your medical records. With Invidio, you can request a clinic appointment, read your test results, renew a prescription or communicate with your care team.     To access your existing account, please contact your [facility] or call [phone] for assistance.        Care EveryWhere ID     This is your Care EveryWhere ID. This could be used by other organizations to access your [place] medical records  [id number]         Blood Pressure from Last 3 Encounters:   09/22/17 (!) 136/91   08/23/17 116/80   02/02/17 138/87    Weight from Last 3 Encounters:   11/08/17 119 lb 3.2 oz (54.1 kg)   10/11/17 121 lb 4.1 oz (55 kg)   09/28/17 117 lb 6.4 oz (53.3 kg)              We Performed the Following     MNT INDIVIDUAL F/U REASSESS, EA 15 MIN        Primary Care Provider Office Phone # Fax #    Majo Doherty [phone]       [address] [facility] [address]        Equal Access to Services     CESAR SHI : Desi Bonilla, lexi tsang, delgado gomez. So [facility]  [phone].    ATENCIÓN: Si eilzabeth bowling, tiene a briscoe disposición servicios gratuitos de asistencia lingüística. Zev moreno [phone].    We comply with applicable federal civil rights laws and [place] laws. We do not discriminate on the basis of race, color, national origin, age, disability, sex, sexual orientation, or gender identity.            Thank you!     Thank you for choosing [facility]  for your care. Our goal is always to provide you with excellent care. Hearing back from our patients is one way we can continue to improve our services. Please take a few minutes to complete the written survey that you may receive in the mail after your visit with us. Thank you!             Your Updated Medication List - Protect others around you: Learn how to safely use, store and throw away your medicines at www.disposemymeds.org.          This list is accurate as of: 11/15/17  3:30 PM.  Always use your most recent med list.                   Brand Name Dispense Instructions for use Diagnosis    methylPREDNISolone 4 MG tablet    MEDROL DOSEPAK    21 tablet    Follow package instructions    Low back pain, unspecified back pain laterality, unspecified chronicity, with sciatica presence unspecified, Pain, radicular, lumbar       TYLENOL PO      Take 1,000 mg by mouth every 8 hours as needed for mild pain or fever

## 2017-11-22 ENCOUNTER — ALLIED HEALTH/NURSE VISIT (OUTPATIENT)
Dept: INTERNAL MEDICINE | Facility: CLINIC | Age: 32
End: 2017-11-22

## 2017-11-22 DIAGNOSIS — R63.0 ANOREXIA: ICD-10-CM

## 2017-11-22 DIAGNOSIS — Z71.3 DIETARY COUNSELING: Primary | ICD-10-CM

## 2017-11-22 NOTE — MR AVS SNAPSHOT
After Visit Summary   11/22/2017    Maria De Jesus Hess    MRN: [id number]           Patient Information     Date Of Birth          1985        Visit Information        Provider Department      11/22/2017 1:00 PM Mary Song RD [facility]        Today's Diagnoses     Dietary counseling    -  1    Anorexia           Follow-ups after your visit        Your next 10 appointments already scheduled     Nov 29, 2017  1:00 PM CST   NUTRITION VISIT with CRISTINA Morales [facility] ([facility])    [address] [facility]  [address] [facility] [phone]            Nov 30, 2017  5:00 PM CST   MR PELVIS W/O CONTRAST with UUMR2   [facility] [place], MRI ([facility])    [address] [facility] [phone]           Take your medicines as usual, unless your doctor tells you not to. Bring a list of your current medicines to your exam (including vitamins, minerals and over-the-counter drugs). Also bring the results of similar scans you may have had.  Please remove any body piercings and hair extensions before you arrive.  Follow your doctor s orders. If you do not, we may have to postpone your exam.  You will not have contrast for this exam. You do not need to do anything special to prepare.  The MRI machine uses a strong magnet. Please wear clothes without metal (snaps, zippers). A sweatsuit works well, or we may give you a hospital gown.   **IMPORTANT** THE INSTRUCTIONS BELOW ARE ONLY FOR THOSE PATIENTS WHO HAVE BEEN TOLD THEY WILL RECEIVE SEDATION OR GENERAL ANESTHESIA DURING THEIR MRI PROCEDURE:  IF YOU WILL RECEIVE SEDATION (take medicine to help you relax during your exam):   You must get the medicine from your doctor before you arrive. Bring the medicine to the exam. Do not take it at home.   Arrive one hour early. Bring someone who can take  you home after the test. Your medicine will make you sleepy. After the exam, you may not drive, take a bus or take a taxi by yourself.   No eating 8 hours before your exam. You may have clear liquids up until 4 hours before your exam. (Clear liquids include water, clear tea, black coffee and fruit juice without pulp.)  IF YOU WILL RECEIVE ANESTHESIA (be asleep for your exam):   Arrive 1 1/2 hours early. Bring someone who can take you home after the test. You may not drive, take a bus or take a taxi by yourself.   No eating 8 hours before your exam. You may have clear liquids up until 4 hours before your exam. (Clear liquids include water, clear tea, black coffee and fruit juice without pulp.)   You will spend four to five hours in the recovery room.  Please call the Imaging Department at your exam site with any questions.            Dec 07, 2017  1:30 PM CST   NUTRITION VISIT with CRISTINA Morales [facility] ([facility])    [address] [place] [phone]            Dec 13, 2017  1:00 PM CST   NUTRITION VISIT with CRISTINA Morales [facility] ([facility])    [address] [place] [address] [phone]            Dec 21, 2017  1:30 PM CST   NUTRITION VISIT with CRISTINA Morales [facility] ([facility])    [address] [place] [address] [phone]            Dec 27, 2017  2:30 PM CST   NUTRITION VISIT with CRISTINA Morales [facility] ([facility])    [address] [place] [address] [phone]              Who to contact     Please call your clinic at [phone] to:    Ask questions about your health    Make or cancel appointments    Discuss your medicines    Learn about your  test results    Speak to your doctor   If you have compliments or concerns about an experience at your clinic, or if you wish to file a complaint, please contact [facility] Patient Relations at [phone] or email us at [email or web address]         Additional Information About Your Visit        [employer] Information     Clipikt gives you secure access to your electronic health record. If you see a primary care provider, you can also send messages to your care team and make appointments. If you have questions, please call your primary care clinic.  If you do not have a primary care provider, please call [phone] and they will assist you.      Neocase Software is an electronic gateway that provides easy, online access to your medical records. With [employer], you can request a clinic appointment, read your test results, renew a prescription or communicate with your care team.     To access your existing account, please contact your [facility] or call [phone] for assistance.        Care EveryWhere ID     This is your Care EveryWhere ID. This could be used by other organizations to access your [place] medical records  [id number]         Blood Pressure from Last 3 Encounters:   09/22/17 (!) 136/91   08/23/17 116/80   02/02/17 138/87    Weight from Last 3 Encounters:   11/08/17 119 lb 3.2 oz (54.1 kg)   10/11/17 121 lb 4.1 oz (55 kg)   09/28/17 117 lb 6.4 oz (53.3 kg)              We Performed the Following     MNT INDIVIDUAL F/U REASSESS, EA 15 MIN        Primary Care Provider    None Specified       No primary provider on file.        Equal Access to Services     CESAR SHI : Hadii lalo gamingo Sojamie, waaxda luqadaha, qaybta kaalmada delgado jones . So [facility] [phone].    ATENCIÓN: Si habla español, tiene a briscoe disposición servicios gratuitos de asistencia lingüística. Llame al [phone].    We comply  with applicable federal civil rights laws and [place] laws. We do not discriminate on the basis of race, color, national origin, age, disability, sex, sexual orientation, or gender identity.            Thank you!     Thank you for choosing [facility]  for your care. Our goal is always to provide you with excellent care. Hearing back from our patients is one way we can continue to improve our services. Please take a few minutes to complete the written survey that you may receive in the mail after your visit with us. Thank you!             Your Updated Medication List - Protect others around you: Learn how to safely use, store and throw away your medicines at www.disposemymeds.org.          This list is accurate as of: 11/22/17  3:46 PM.  Always use your most recent med list.                   Brand Name Dispense Instructions for use Diagnosis    methylPREDNISolone 4 MG tablet    MEDROL DOSEPAK    21 tablet    Follow package instructions    Low back pain, unspecified back pain laterality, unspecified chronicity, with sciatica presence unspecified, Pain, radicular, lumbar       TYLENOL PO      Take 1,000 mg by mouth every 8 hours as needed for mild pain or fever

## 2017-11-22 NOTE — PROGRESS NOTES
Maria De Jesus Hess  is a 31 year old female presents today for follow-up nutrition consultation.   She is waiting for residential treatment at the [facility].  The biggest difficulty to know that insurance may not cover her treatment. She may find out more about that tomorrow.     Indicated that potatoes, milk is preference not eating disorder. Will continue helping her.  Will need to explore more eating disorder versus preference.        Vitals:  Wt Readings from Last 4 Encounters:   11/08/17 119 lb 3.2 oz (54.1 kg)   10/11/17 121 lb 4.1 oz (55 kg)   09/28/17 117 lb 6.4 oz (53.3 kg)   09/22/17 121 lb 8 oz (55.1 kg)       Nutrition History  Patient is on a regular  diet at home.  9:45: Banana and PB  14:00:  Kombucha  18:15:   Salad, salsa, hummus, cheese, tortilla chips (handful)  Bedtime snack: Banana, greek yogurt,     Physical Activity  Ran 30 miles last week    Time with Patient: 60 minutes    Nutrition  DX:.   1. Dietary counseling    2. Anorexia           Nutrition Goals:   1: Every morning she will have 2 food groups.   Will ask for accountability from her friend and try to connect with her every morning to make sure she is doing this goa.   2: Follow through with recommendation at the [facility].   Long discussion on why she needs higher level of care and more help.       Mary Song, MS, RD, CSSD, LD  [facility]

## 2017-11-26 ENCOUNTER — HEALTH MAINTENANCE LETTER (OUTPATIENT)
Age: 32
End: 2017-11-26

## 2017-11-29 ENCOUNTER — ALLIED HEALTH/NURSE VISIT (OUTPATIENT)
Dept: INTERNAL MEDICINE | Facility: CLINIC | Age: 32
End: 2017-11-29

## 2017-11-29 DIAGNOSIS — R63.0 ANOREXIA: ICD-10-CM

## 2017-11-29 DIAGNOSIS — Z71.3 DIETARY COUNSELING: Primary | ICD-10-CM

## 2017-11-29 NOTE — MR AVS SNAPSHOT
After Visit Summary   11/29/2017    Maria De Jesus Hess    MRN: [id number]           Patient Information     Date Of Birth          1985        Visit Information        Provider Department      11/29/2017 1:00 PM Mary Song RD [facility]        Today's Diagnoses     Dietary counseling    -  1    Anorexia           Follow-ups after your visit        Your next 10 appointments already scheduled     Dec 07, 2017  1:30 PM CST   NUTRITION VISIT with CRISTINA Morales [facility] ([facility])    [address] [place] [address] [phone]            Dec 13, 2017  1:00 PM CST   NUTRITION VISIT with CRISTINA Morales [facility] ([facility])    [address] [place] [address] [phone]            Dec 21, 2017  1:30 PM CST   NUTRITION VISIT with CRISTINA Morales [facility] ([facility])    [address] [place] [address] [phone]            Dec 27, 2017  2:30 PM CST   NUTRITION VISIT with CRISTINA Morales [facility] ([facility])    [address] [place] [address] [phone]              Who to contact     Please call your clinic at [phone] to:    Ask questions about your health    Make or cancel appointments    Discuss your medicines    Learn about your test results    Speak to your doctor   If you have compliments or concerns about an experience at your clinic, or if you wish to file a complaint, please contact [facility] Patient Relations at [phone] or email us at [email or web address]         Additional Information About Your Visit        MyChart Information     adQuotahart gives you secure access to your  electronic health record. If you see a primary care provider, you can also send messages to your care team and make appointments. If you have questions, please call your primary care clinic.  If you do not have a primary care provider, please call [phone] and they will assist you.      XDx is an electronic gateway that provides easy, online access to your medical records. With XDx, you can request a clinic appointment, read your test results, renew a prescription or communicate with your care team.     To access your existing account, please contact your [facility] or call [phone] for assistance.        Care EveryWhere ID     This is your Care EveryWhere ID. This could be used by other organizations to access your [place] medical records  [id number]         Blood Pressure from Last 3 Encounters:   09/22/17 (!) 136/91   08/23/17 116/80   02/02/17 138/87    Weight from Last 3 Encounters:   11/08/17 119 lb 3.2 oz (54.1 kg)   10/11/17 121 lb 4.1 oz (55 kg)   09/28/17 117 lb 6.4 oz (53.3 kg)              We Performed the Following     MNT INDIVIDUAL F/U REASSESS, EA 15 MIN        Primary Care Provider    None Specified       No primary provider on file.        Equal Access to Services     CESAR SHI : Hadii lalo gamingo Sojamie, waaxda luqadaha, qaybta kaalmada adetaeyada, delgado dinh. So [facility] [phone].    ATENCIÓN: Si habla español, tiene a briscoe disposición servicios gratuitos de asistencia lingüística. Llame al [phone].    We comply with applicable federal civil rights laws and [place] laws. We do not discriminate on the basis of race, color, national origin, age, disability, sex, sexual orientation, or gender identity.            Thank you!     Thank you for choosing [facility]  for your care. Our goal is always to provide you with excellent care. Hearing back from our patients is one way we can  continue to improve our services. Please take a few minutes to complete the written survey that you may receive in the mail after your visit with us. Thank you!             Your Updated Medication List - Protect others around you: Learn how to safely use, store and throw away your medicines at www.disposemymeds.org.          This list is accurate as of: 11/29/17  3:01 PM.  Always use your most recent med list.                   Brand Name Dispense Instructions for use Diagnosis    methylPREDNISolone 4 MG tablet    MEDROL DOSEPAK    21 tablet    Follow package instructions    Low back pain, unspecified back pain laterality, unspecified chronicity, with sciatica presence unspecified, Pain, radicular, lumbar       TYLENOL PO      Take 1,000 mg by mouth every 8 hours as needed for mild pain or fever

## 2017-11-29 NOTE — PROGRESS NOTES
Maria De Jesus Hess  is a 31 year old female presents today for follow-up nutrition consultation.   Maria De Jesus is figuring out her work situation for treatment at the [facility].   She will be starting in 2 weeks.   Indicated that potatoes, milk is preference not eating disorder. Will continue helping her.  Will need to explore more eating disorder versus preference.        Vitals:  Wt Readings from Last 4 Encounters:   11/08/17 119 lb 3.2 oz (54.1 kg)   10/11/17 121 lb 4.1 oz (55 kg)   09/28/17 117 lb 6.4 oz (53.3 kg)   09/22/17 121 lb 8 oz (55.1 kg)       Nutrition History  Patient is on a regular  diet at home.  9:45: Banana and PB  14:00:  Kombucha  18:15:   Salad, salsa, hummus, cheese, tortilla chips (handful)  Bedtime snack: Banana, greek yogurt    Physical Activity  Ran 35 miles last week    Time with Patient: 60 minutes    Nutrition  DX:.   1. Dietary counseling    2. Anorexia           Nutrition Goals:   1: Every morning she will eat banana on her way to work.     2: Follow through with recommendation at the [facility].   Long discussion on why she needs higher level of care and more help.       Mary Song, MS, RD, CSSD, LD  [facility]

## 2017-12-07 ENCOUNTER — ALLIED HEALTH/NURSE VISIT (OUTPATIENT)
Dept: INTERNAL MEDICINE | Facility: CLINIC | Age: 32
End: 2017-12-07

## 2017-12-07 DIAGNOSIS — F50.9 EATING DISORDER: ICD-10-CM

## 2017-12-07 DIAGNOSIS — Z71.3 DIETARY COUNSELING: Primary | ICD-10-CM

## 2017-12-07 DIAGNOSIS — E63.8 NUTRITION DEFICIENCY DUE TO A PARTICULAR KIND OF FOOD: ICD-10-CM

## 2017-12-07 NOTE — PROGRESS NOTES
Maria De Jesus Hess  is a 31 year old female presents today for follow-up nutrition consultation.   She will be starting [facility] 12/18/17.  She is scared.    Food preferences:  No red meat, no pork, no meat with skins and bones, no regular milk, no colon.  Ok with other meat, almond milk, Fairlife milk, miracle whilp  Challenge foods:  Grains, deserts, processed foods, rice, potatoes, salad dressing.  Meal plan total:  Grain=8, Protein=9, milk=3, veggies=3, fruit=3, fat=3, dessert=1  Energy needs:  5126-6930 kcals, 360-420g carbs, 96-120g protein, 50-65g fat.        Vitals:  Wt Readings from Last 4 Encounters:   11/08/17 119 lb 3.2 oz (54.1 kg)   10/11/17 121 lb 4.1 oz (55 kg)   09/28/17 117 lb 6.4 oz (53.3 kg)   09/22/17 121 lb 8 oz (55.1 kg)       Nutrition History  Patient is on a regular  diet at home.  9:45: Banana and PB  14:00:  Kombucha  18:15:   Salad, salsa, hummus, cheese, tortilla chips (handful)  Bedtime snack: Banana, greek yogurt    Physical Activity  Ran 35 miles last week    Time with Patient: 60 minutes    Nutrition  DX:.   1. Dietary counseling    2. Eating disorder    3. Nutrition deficiency due to a particular kind of food (H)           Nutrition Goals:   1: Every morning she will eat banana on her way to work.     2: Follow through with recommendation at the [facility].   Long discussion on why she needs higher level of care and more help.       Mary Song, MS, RD, CSSD, LD  [facility]

## 2017-12-07 NOTE — MR AVS SNAPSHOT
After Visit Summary   12/7/2017    Maria De Jesus Hess    MRN: [id number]           Patient Information     Date Of Birth          1985        Visit Information        Provider Department      12/7/2017 1:30 PM Mary Song RD M [employer] [facility] [facility]        Today's Diagnoses     Dietary counseling    -  1    Eating disorder        Nutrition deficiency due to a particular kind of food (H)           Follow-ups after your visit        Your next 10 appointments already scheduled     Dec 13, 2017  1:00 PM CST   NUTRITION VISIT with CRISTINA Morales [employer] [facility] ([facility])    [address] [place] [address] [phone]            Dec 21, 2017  1:30 PM CST   NUTRITION VISIT with CRISTINA Morales [employer] [facility] [employer] [facility] ([facility])    [address] [place] [address] [phone]            Dec 27, 2017  2:30 PM CST   NUTRITION VISIT with CRISTINA Morales [employer] [facility] [employer] [facility] ([facility])    [address] [place] [address] [phone]              Who to contact     Please call your clinic at [phone] to:    Ask questions about your health    Make or cancel appointments    Discuss your medicines    Learn about your test results    Speak to your doctor   If you have compliments or concerns about an experience at your clinic, or if you wish to file a complaint, please contact [facility] Patient Relations at [phone] or email us at [email or web address]         Additional Information About Your Visit        MyChart Information     Xcel[employer] gives you secure access to your electronic health record. If you see a primary care provider, you can also send messages to your care team and make appointments. If you have questions, please call  your primary care clinic.  If you do not have a primary care provider, please call [phone] and they will assist you.      [employer] is an electronic gateway that provides easy, online access to your medical records. With [employer], you can request a clinic appointment, read your test results, renew a prescription or communicate with your care team.     To access your existing account, please contact your [facility] or call [phone] for assistance.        Care EveryWhere ID     This is your Care EveryWhere ID. This could be used by other organizations to access your [place] medical records  [id number]         Blood Pressure from Last 3 Encounters:   09/22/17 (!) 136/91   08/23/17 116/80   02/02/17 138/87    Weight from Last 3 Encounters:   11/08/17 119 lb 3.2 oz (54.1 kg)   10/11/17 121 lb 4.1 oz (55 kg)   09/28/17 117 lb 6.4 oz (53.3 kg)              We Performed the Following     MNT INDIVIDUAL F/U REASSESS, EA 15 MIN        Primary Care Provider    None Specified       No primary provider on file.        Equal Access to Services     [facility] Hospital[facility] : Hadii lalo Bonilla, wabusterda sharan, qazaria jones, delgado moreno . So [facility] [phone].    ATENCIÓN: Si habla español, tiene a briscoe disposición servicios gratuitos de asistencia lingüística. Llame al [phone].    We comply with applicable federal civil rights laws and [place] laws. We do not discriminate on the basis of race, color, national origin, age, disability, sex, sexual orientation, or gender identity.            Thank you!     Thank you for choosing [facility]  for your care. Our goal is always to provide you with excellent care. Hearing back from our patients is one way we can continue to improve our services. Please take a few minutes to complete the written survey that you may receive in the mail after your visit with us. Thank you!              Your Updated Medication List - Protect others around you: Learn how to safely use, store and throw away your medicines at www.disposemymeds.org.          This list is accurate as of: 12/7/17  3:19 PM.  Always use your most recent med list.                   Brand Name Dispense Instructions for use Diagnosis    methylPREDNISolone 4 MG tablet    MEDROL DOSEPAK    21 tablet    Follow package instructions    Low back pain, unspecified back pain laterality, unspecified chronicity, with sciatica presence unspecified, Pain, radicular, lumbar       TYLENOL PO      Take 1,000 mg by mouth every 8 hours as needed for mild pain or fever

## 2017-12-13 ENCOUNTER — ALLIED HEALTH/NURSE VISIT (OUTPATIENT)
Dept: INTERNAL MEDICINE | Facility: CLINIC | Age: 32
End: 2017-12-13
Payer: COMMERCIAL

## 2017-12-13 DIAGNOSIS — E63.8 NUTRITION DEFICIENCY DUE TO A PARTICULAR KIND OF FOOD: ICD-10-CM

## 2017-12-13 DIAGNOSIS — R63.0 ANOREXIA: ICD-10-CM

## 2017-12-13 DIAGNOSIS — Z71.3 DIETARY COUNSELING: Primary | ICD-10-CM

## 2017-12-13 NOTE — MR AVS SNAPSHOT
After Visit Summary   12/13/2017    Maria De Jesus Hess    MRN: [id number]           Patient Information     Date Of Birth          1985        Visit Information        Provider Department      12/13/2017 1:00 PM Mary Song RD M [employer]        Today's Diagnoses     Dietary counseling    -  1    Anorexia        Nutrition deficiency due to a particular kind of food (H)           Follow-ups after your visit        Your next 10 appointments already scheduled     Dec 21, 2017  1:30 PM CST   NUTRITION VISIT with CRISTINA Morales [employer] [facility] [employer] ([facility])    [address] [place] [address] [phone]            Dec 27, 2017  2:30 PM CST   NUTRITION VISIT with CRISTINA Morales [employer] ([facility])    [address] [place] [address] [phone]              Who to contact     Please call your clinic at [phone] to:    Ask questions about your health    Make or cancel appointments    Discuss your medicines    Learn about your test results    Speak to your doctor   If you have compliments or concerns about an experience at your clinic, or if you wish to file a complaint, please contact [facility] Patient Relations at [phone] or email us at [email or web address]         Additional Information About Your Visit        MyChart Information     [employer] gives you secure access to your electronic health record. If you see a primary care provider, you can also send messages to your care team and make appointments. If you have questions, please call your primary care clinic.  If you do not have a primary care provider, please call [phone] and they will assist you.      [employer] is an electronic gateway that provides easy, online access to your medical records. With [employer], you  can request a clinic appointment, read your test results, renew a prescription or communicate with your care team.     To access your existing account, please contact your [facility] or call [phone] for assistance.        Care EveryWhere ID     This is your Care EveryWhere ID. This could be used by other organizations to access your [place] medical records  [id number]         Blood Pressure from Last 3 Encounters:   09/22/17 (!) 136/91   08/23/17 116/80   02/02/17 138/87    Weight from Last 3 Encounters:   11/08/17 119 lb 3.2 oz (54.1 kg)   10/11/17 121 lb 4.1 oz (55 kg)   09/28/17 117 lb 6.4 oz (53.3 kg)              We Performed the Following     MNT INDIVIDUAL F/U REASSESS, EA 15 MIN        Primary Care Provider    None Specified       No primary provider on file.        Equal Access to Services     [facility] Hospital[facility]YCE : Hadii lalo Bonilla, lexi tsang, avinash dupreealmayenni jones, delgado moreno . So [facility] [phone].    ATENCIÓN: Si habla español, tiene a briscoe disposición servicios gratuitos de asistencia lingüística. Llame al [phone].    We comply with applicable federal civil rights laws and [place] laws. We do not discriminate on the basis of race, color, national origin, age, disability, sex, sexual orientation, or gender identity.            Thank you!     Thank you for choosing [facility]  for your care. Our goal is always to provide you with excellent care. Hearing back from our patients is one way we can continue to improve our services. Please take a few minutes to complete the written survey that you may receive in the mail after your visit with us. Thank you!             Your Updated Medication List - Protect others around you: Learn how to safely use, store and throw away your medicines at www.disposemymeds.org.          This list is accurate as of: 12/13/17  3:50 PM.  Always use your most recent  med list.                   Brand Name Dispense Instructions for use Diagnosis    methylPREDNISolone 4 MG tablet    MEDROL DOSEPAK    21 tablet    Follow package instructions    Low back pain, unspecified back pain laterality, unspecified chronicity, with sciatica presence unspecified, Pain, radicular, lumbar       TYLENOL PO      Take 1,000 mg by mouth every 8 hours as needed for mild pain or fever

## 2017-12-13 NOTE — PROGRESS NOTES
Maria De Jesus Hess  is a 31 year old female presents today for follow-up nutrition consultation.   [facility]  She is now doubting that she needs to go to the house.  Did not have therapy appointment.     Food preferences:  No red meat, no pork, no meat with skins and bones, no regular milk, no colon.  Ok with other meat, almond milk, Fairlife milk, miracle whilp  Challenge foods:  Grains, deserts, processed foods, rice, potatoes, salad dressing.  Meal plan total:  Grain=8, Protein=9, milk=3, veggies=3, fruit=3, fat=3, dessert=1  Energy needs:  3603-0455 kcals, 360-420g carbs, 96-120g protein, 50-65g fat.        Vitals:  Wt Readings from Last 4 Encounters:   11/08/17 119 lb 3.2 oz (54.1 kg)   10/11/17 121 lb 4.1 oz (55 kg)   09/28/17 117 lb 6.4 oz (53.3 kg)   09/22/17 121 lb 8 oz (55.1 kg)       Nutrition History  Patient is on a regular  diet at home.  9:45:  none  14:00:  Kombucha  18:15:  Turkey sandwitch, granola bar, string cheese   Bedtime snack: Banana, Greek yogurt, 1 Tbsp PB, and 1 Tbsp potato starch     Physical Activity  Ran 35 miles last week    Time with Patient: 60 minutes    Nutrition  DX:.   1. Dietary counseling    2. Anorexia    3. Nutrition deficiency due to a particular kind of food (H)           Nutrition Goals:   1: Every morning she will have 2 waffles with PB and for every lunch she will have greek yogurt or Rx bar. We will be modifying physical activity if nutrition goals are not complete.  2: Follow through with recommendation at the [facility].   Long discussion on why she needs higher level of care and more help.       Mary Song, MS, RD, CSSD, LD  [facility]

## 2017-12-21 ENCOUNTER — ALLIED HEALTH/NURSE VISIT (OUTPATIENT)
Dept: INTERNAL MEDICINE | Facility: CLINIC | Age: 32
End: 2017-12-21
Payer: COMMERCIAL

## 2017-12-21 DIAGNOSIS — Z71.3 DIETARY COUNSELING: Primary | ICD-10-CM

## 2017-12-21 DIAGNOSIS — F50.019 ANOREXIA NERVOSA, RESTRICTING TYPE: ICD-10-CM

## 2017-12-21 NOTE — MR AVS SNAPSHOT
After Visit Summary   12/21/2017    Maria De Jesus Hess    MRN: [id number]           Patient Information     Date Of Birth          1985        Visit Information        Provider Department      12/21/2017 1:30 PM [facility] Song RD [facility]        Today's Diagnoses     Dietary counseling    -  1    Anorexia nervosa, restricting type           Follow-ups after your visit        Your next 10 appointments already scheduled     Dec 27, 2017  8:00 AM CST   (Arrive by 7:45 AM)   MR PELVIS W/O CONTRAST with UUMR2   [facility] [place], MRI ([facility])    [address] [address] [phone]           Take your medicines as usual, unless your doctor tells you not to. Bring a list of your current medicines to your exam (including vitamins, minerals and over-the-counter drugs). Also bring the results of similar scans you may have had.  Please remove any body piercings and hair extensions before you arrive.  Follow your doctor s orders. If you do not, we may have to postpone your exam.  You will not have contrast for this exam. You do not need to do anything special to prepare.  The MRI machine uses a strong magnet. Please wear clothes without metal (snaps, zippers). A sweatsuit works well, or we may give you a hospital gown.   **IMPORTANT** THE INSTRUCTIONS BELOW ARE ONLY FOR THOSE PATIENTS WHO HAVE BEEN TOLD THEY WILL RECEIVE SEDATION OR GENERAL ANESTHESIA DURING THEIR MRI PROCEDURE:  IF YOU WILL RECEIVE SEDATION (take medicine to help you relax during your exam):   You must get the medicine from your doctor before you arrive. Bring the medicine to the exam. Do not take it at home.   Arrive one hour early. Bring someone who can take you home after the test. Your medicine will make you sleepy. After the exam, you may not drive, take a bus or take a taxi by yourself.   No eating 8 hours before your exam. You may  have clear liquids up until 4 hours before your exam. (Clear liquids include water, clear tea, black coffee and fruit juice without pulp.)  IF YOU WILL RECEIVE ANESTHESIA (be asleep for your exam):   Arrive 1 1/2 hours early. Bring someone who can take you home after the test. You may not drive, take a bus or take a taxi by yourself.   No eating 8 hours before your exam. You may have clear liquids up until 4 hours before your exam. (Clear liquids include water, clear tea, black coffee and fruit juice without pulp.)   You will spend four to five hours in the recovery room.  Please call the Imaging Department at your exam site with any questions.            Dec 27, 2017  2:30 PM CST   NUTRITION VISIT with CRISTINA Morales [facility] ([facility])    [address] [place] [address]   [phone]            Jan 02, 2018 11:45 AM CST   (Arrive by 11:30 AM)   Return Nurtrition Mary with CRISTINA Morales [facility] ([facility])    [address] [place] [address]   [phone]            Eddie 10, 2018  2:30 PM CST   NUTRITION VISIT with CRISTINA Morales [facility] ([facility])    [address] [place] [address]   [phone]            Jan 25, 2018  1:00 PM CST   NUTRITION VISIT with CRISTINA Morales [facility] ([facility])    [address] [place] [address]   [phone]            Jan 31, 2018  2:00 PM CST   NUTRITION VISIT with CRISTINA Morales [employer] [facility] ([facility])    [address] [place] [address]   [phone]            Feb 07, 2018  1:00 PM CST   NUTRITION VISIT with CRISTINA Morales [employer] [facility] ([facility]    [address] [place] [id number] [phone]            Feb 14, 2018  1:30 PM CST   NUTRITION VISIT with CRISTINA Morales [employer] ([facility])    [address] [place] [id number] [phone]            Feb 21, 2018  1:00 PM CST   NUTRITION VISIT with CRISTINA Morales [employer] ([facility])    [address] [place] [id number] [phone]            Feb 28, 2018  1:00 PM CST   NUTRITION VISIT with CRISTINA Morales [employer] ([facility])    [address] [place] [id number] [phone]              Who to contact     Please call your clinic at [phone] to:    Ask questions about your health    Make or cancel appointments    Discuss your medicines    Learn about your test results    Speak to your doctor   If you have compliments or concerns about an experience at your clinic, or if you wish to file a complaint, please contact [facility] Patient Relations at [phone] or email us at [email or web address]         Additional Information About Your Visit        [employer] Information     [employer] gives you secure access to your electronic health record. If you see a primary care provider, you can also send messages to your care team and make appointments. If you have questions, please call your primary care clinic.  If you do not have a primary care provider, please call [phone] and they will assist you.      [employer] is an electronic gateway that provides easy, online access to your medical records. With [employer], you can request a clinic appointment, read your test results, renew a prescription or communicate with your care team.     To access your existing account, please  contact your [facility] or call [phone] for assistance.        Care EveryWhere ID     This is your Care EveryWhere ID. This could be used by other organizations to access your [place] medical records  [id number]         Blood Pressure from Last 3 Encounters:   09/22/17 (!) 136/91   08/23/17 116/80   02/02/17 138/87    Weight from Last 3 Encounters:   11/08/17 54.1 kg (119 lb 3.2 oz)   10/11/17 55 kg (121 lb 4.1 oz)   09/28/17 53.3 kg (117 lb 6.4 oz)              We Performed the Following     MNT INDIVIDUAL F/U REASSESS, EA 15 MIN        Primary Care Provider    None Specified       No primary provider on file.        Equal Access to Services     CESAR SHI : Hadii lalo gamingo Sojamie, waaxda luqadaha, qaybta kaalmada adetaeyayenni, delgado moreno . So [facility] [phone].    ATENCIÓN: Si habla español, tiene a briscoe disposición servicios gratuitos de asistencia lingüística. Llame al [phone].    We comply with applicable federal civil rights laws and [place] laws. We do not discriminate on the basis of race, color, national origin, age, disability, sex, sexual orientation, or gender identity.            Thank you!     Thank you for choosing [facility]  for your care. Our goal is always to provide you with excellent care. Hearing back from our patients is one way we can continue to improve our services. Please take a few minutes to complete the written survey that you may receive in the mail after your visit with us. Thank you!             Your Updated Medication List - Protect others around you: Learn how to safely use, store and throw away your medicines at www.disposemymeds.org.          This list is accurate as of: 12/21/17  2:33 PM.  Always use your most recent med list.                   Brand Name Dispense Instructions for use Diagnosis    methylPREDNISolone 4 MG tablet    MEDROL DOSEPAK    21 tablet    Follow  package instructions    Low back pain, unspecified back pain laterality, unspecified chronicity, with sciatica presence unspecified, Pain, radicular, lumbar       TYLENOL PO      Take 1,000 mg by mouth every 8 hours as needed for mild pain or fever

## 2017-12-21 NOTE — PROGRESS NOTES
"Maria De Jesus Hess  is a 31 year old female presents today for follow-up nutrition consultation.   [facility]  She is now doubting that she needs to go to the house.  Did not have therapy appointment.     Food preferences:  No red meat, no pork, no meat with skins and bones, no regular milk, no colon.  Ok with other meat, almond milk, Fairlife milk, miracle whilp  Challenge foods:  Grains, deserts, processed foods, rice, potatoes, salad dressing.  Meal plan total:  Grain=8, Protein=9, milk=3, veggies=3, fruit=3, fat=3, dessert=1  Energy needs:  8712-1165 kcals, 360-420g carbs, 96-120g protein, 50-65g fat.        Vitals:  Wt Readings from Last 4 Encounters:   11/08/17 54.1 kg (119 lb 3.2 oz)   10/11/17 55 kg (121 lb 4.1 oz)   09/28/17 53.3 kg (117 lb 6.4 oz)   09/22/17 55.1 kg (121 lb 8 oz)       Nutrition History  Patient is on a regular  diet at home.  9:45:  none  14:00:  Fabienneucha  18:15:  Tur[place] sandwitch, granola bar, string cheese   Bedtime snack: Banana, Greek yogurt, 1 Tbsp PB, and 1 Tbsp potato starch     Physical Activity  Ran 35 miles last week    Time with Patient: 60 minutes    Nutrition  DX:.   1. Dietary counseling    2. Anorexia nervosa, restricting type           Nutrition Goals:   1: She will have 5 times \"something\" for breakfast which include some carbohydrate (eg banana, grain, any other fruit)  2: Follow through with recommendation at the [facility].   Long discussion on why she needs higher level of care and more help.       Mary Song, MS, RD, CSSD, LD  [facility]    "

## 2017-12-27 ENCOUNTER — ALLIED HEALTH/NURSE VISIT (OUTPATIENT)
Dept: INTERNAL MEDICINE | Facility: CLINIC | Age: 32
End: 2017-12-27
Payer: COMMERCIAL

## 2017-12-27 DIAGNOSIS — R63.0 ANOREXIA: ICD-10-CM

## 2017-12-27 DIAGNOSIS — Z71.3 DIETARY COUNSELING: Primary | ICD-10-CM

## 2017-12-27 NOTE — PROGRESS NOTES
"Maria De Jesus Hess  is a 31 year old female presents today for follow-up nutrition consultation.   [facility]  She is now doubting that she needs to go to the house.      Food preferences:  No red meat, no pork, no meat with skins and bones, no regular milk, no colon.  Ok with other meat, almond milk, Fairlife milk, miracle whilp  Challenge foods:  Grains, deserts, processed foods, rice, potatoes, salad dressing.  Meal plan total:  Grain=8, Protein=9, milk=3, veggies=3, fruit=3, fat=3, dessert=1  Energy needs:  1498-9060 kcals, 360-420g carbs, 96-120g protein, 50-65g fat.        Vitals:  Wt Readings from Last 4 Encounters:   11/08/17 54.1 kg (119 lb 3.2 oz)   10/11/17 55 kg (121 lb 4.1 oz)   09/28/17 53.3 kg (117 lb 6.4 oz)   09/22/17 55.1 kg (121 lb 8 oz)       Nutrition History  Patient is on a regular  diet at home.  9:45:  none  14:00:  Kombucha  18:15:  Turkey sandwitch, granola bar, string cheese   Bedtime snack: Banana, Greek yogurt, 1 Tbsp PB, and 1 Tbsp potato starch     Physical Activity  Ran 35 miles last week    Time with Patient: 60 minutes    Nutrition  DX:.   1. Dietary counseling    2. Anorexia           Nutrition Goals:   1: She will have 6 times breakfast with 4 food groups \"[place] [employer]\"  2: Follow through with recommendation at the [facility].   Long discussion on why she needs higher level of care and more help.   Continues to use running as a distraction for not going into the house.  Discussed importance to be open minded and to learn how to fuel her body.      Mary Song, MS, RD, CSSD, LD  [facility]    "

## 2017-12-27 NOTE — MR AVS SNAPSHOT
After Visit Summary   12/27/2017    Maria De Jesus Hess    MRN: [id number]           Patient Information     Date Of Birth          1985        Visit Information        Provider Department      12/27/2017 2:30 PM Mary Song RD [facility]        Today's Diagnoses     Dietary counseling    -  1    Anorexia           Follow-ups after your visit        Your next 10 appointments already scheduled     Jan 02, 2018 11:45 AM CST   (Arrive by 11:30 AM)   Return Nurtrition Mary with [facility] ([facility])    [address] [place] [address]   [phone]            Eddie 10, 2018  2:30 PM CST   NUTRITION VISIT with [facility] ([facility])    [address] [place] [address] [phone]            Jan 25, 2018  1:00 PM CST   NUTRITION VISIT with [facility] ([facility])    [address] [place] [address] [phone]            Jan 31, 2018  2:00 PM CST   NUTRITION VISIT with [facility] ([facility])    [address] [place] [address] [phone]            Feb 07, 2018  1:00 PM CST   NUTRITION VISIT with [facility] ([facility])    [address] [place] [address] [phone]            Feb 14, 2018  1:30 PM CST   NUTRITION VISIT with [facility] ([facility])    [address] [place] [id number] [phone]            Feb 21, 2018  1:00 PM CST   NUTRITION VISIT with [facility]  ([facility])    [address] [facility]  5th [facility] [id number] [phone]            Feb 28, 2018  1:00 PM CST   NUTRITION VISIT with Mary Song RD   [facility] ([facility])    [address] [facility]  5th [facility] [id number] [phone]              Who to contact     Please call your clinic at [phone] to:    Ask questions about your health    Make or cancel appointments    Discuss your medicines    Learn about your test results    Speak to your doctor   If you have compliments or concerns about an experience at your clinic, or if you wish to file a complaint, please contact [facility] Patient Relations at [phone] or email us at [email or web address]         Additional Information About Your Visit        Streetlifehart Information     [email or web address] gives you secure access to your electronic health record. If you see a primary care provider, you can also send messages to your care team and make appointments. If you have questions, please call your primary care clinic.  If you do not have a primary care provider, please call [phone] and they will assist you.      StoryBlender is an electronic gateway that provides easy, online access to your medical records. With StoryBlender, you can request a clinic appointment, read your test results, renew a prescription or communicate with your care team.     To access your existing account, please contact your [facility] or call [phone] for assistance.        Care EveryWhere ID     This is your Care EveryWhere ID. This could be used by other organizations to access your [place] medical records  [id number]         Blood Pressure from Last 3 Encounters:   09/22/17 (!) 136/91   08/23/17 116/80   02/02/17 138/87    Weight from Last 3 Encounters:   11/08/17 54.1 kg (119 lb 3.2 oz)   10/11/17 55 kg (121 lb 4.1  oz)   09/28/17 53.3 kg (117 lb 6.4 oz)              We Performed the Following     MNT INDIVIDUAL F/U REASSESS, EA 15 MIN        Primary Care Provider    None Specified       No primary provider on file.        Equal Access to Services     CESAR SHI : Hadii lalo ku mekhio Sokailashali, wabusterda luqadaha, qaybta kaalmada karen, delgado kimblejolynn dinh. So [facility] [phone].    ATENCIÓN: Si habla español, tiene a briscoe disposición servicios gratuitos de asistencia lingüística. Llame al [phone].    We comply with applicable federal civil rights laws and [place] laws. We do not discriminate on the basis of race, color, national origin, age, disability, sex, sexual orientation, or gender identity.            Thank you!     Thank you for choosing [facility]  for your care. Our goal is always to provide you with excellent care. Hearing back from our patients is one way we can continue to improve our services. Please take a few minutes to complete the written survey that you may receive in the mail after your visit with us. Thank you!             Your Updated Medication List - Protect others around you: Learn how to safely use, store and throw away your medicines at www.disposemymeds.org.          This list is accurate as of: 12/27/17  4:02 PM.  Always use your most recent med list.                   Brand Name Dispense Instructions for use Diagnosis    methylPREDNISolone 4 MG tablet    MEDROL DOSEPAK    21 tablet    Follow package instructions    Low back pain, unspecified back pain laterality, unspecified chronicity, with sciatica presence unspecified, Pain, radicular, lumbar       TYLENOL PO      Take 1,000 mg by mouth every 8 hours as needed for mild pain or fever

## 2018-01-02 ENCOUNTER — OFFICE VISIT (OUTPATIENT)
Dept: ORTHOPEDICS | Facility: CLINIC | Age: 33
End: 2018-01-02
Payer: COMMERCIAL

## 2018-01-02 DIAGNOSIS — R63.0 ANOREXIA: ICD-10-CM

## 2018-01-02 DIAGNOSIS — Z71.3 DIETARY COUNSELING: Primary | ICD-10-CM

## 2018-01-02 NOTE — MR AVS SNAPSHOT
After Visit Summary   1/2/2018    Maria De Jesus Hess    MRN: [id number]           Patient Information     Date Of Birth          1985        Visit Information        Provider Department      1/2/2018 11:45 AM [facility]        Today's Diagnoses     Dietary counseling    -  1    Anorexia           Follow-ups after your visit        Your next 10 appointments already scheduled     Eddie 10, 2018  2:30 PM CST   NUTRITION VISIT with CRISTINA [facility]ales [facility] ([facility])    [address] [place] [id number] [phone]            Jan 25, 2018  1:00 PM CST   NUTRITION VISIT with CRISTINA Morales [facility] ([facility])    [address] [place] [id number] [phone]            Jan 31, 2018  2:00 PM CST   NUTRITION VISIT with CRISTINA Morales [facility] ([facility])    [address] [address] [phone]            Feb 07, 2018  1:00 PM CST   NUTRITION VISIT with CRISTINA Morales [facility] ([facility])    [address] [address] [phone]            Feb 14, 2018  1:30 PM CST   NUTRITION VISIT with CRISTINA Morales [facility] ([facility])    [address] [address] [phone]            Feb 21, 2018  1:00 PM CST   NUTRITION VISIT with CRISTINA Morales [facility] ([facility])    [address] [address] [phone]            Feb 28, 2018  1:00 PM CST   NUTRITION VISIT with CRISTINA Morales [facility] ([facility])    [address] [facility]  5th [facility] [address] [phone]              Who to contact     Please call your clinic at [phone] to:    Ask questions about your health    Make or cancel appointments    Discuss your medicines    Learn about your test results    Speak to your doctor   If you have compliments or concerns about an experience at your clinic, or if you wish to file a complaint, please contact [facility] Patient Relations at [phone] or email us at [email or web address]         Additional Information About Your Visit        TravelTriangleharMercury Puzzle Information     Tilkee gives you secure access to your electronic health record. If you see a primary care provider, you can also send messages to your care team and make appointments. If you have questions, please call your primary care clinic.  If you do not have a primary care provider, please call [phone] and they will assist you.      Tilkee is an electronic gateway that provides easy, online access to your medical records. With Tilkee, you can request a clinic appointment, read your test results, renew a prescription or communicate with your care team.     To access your existing account, please contact your [facility] or call [phone] for assistance.        Care EveryWhere ID     This is your Care EveryWhere ID. This could be used by other organizations to access your [place] medical records  [id number]         Blood Pressure from Last 3 Encounters:   09/22/17 (!) 136/91   08/23/17 116/80   02/02/17 138/87    Weight from Last 3 Encounters:   11/08/17 54.1 kg (119 lb 3.2 oz)   10/11/17 55 kg (121 lb 4.1 oz)   09/28/17 53.3 kg (117 lb 6.4 oz)              We Performed the Following     MNT INDIVIDUAL F/U REASSESS, EA 15 MIN        Primary Care Provider    None Specified       No primary provider on file.        Equal Access to Services     CESAR MCCORD: Desi  lalo Bonilla, wabusterda luwilliamsadaha, qaybta kalorraine jones, waxfatmata issac tadeokarlyfiliberto moreno allegra. So [facility] [phone].    ATENCIÓN: Si habla papitoañol, tiene a briscoe disposición servicios gratuitos de asistencia lingüística. Llame al [phone].    We comply with applicable federal civil rights laws and [place] laws. We do not discriminate on the basis of race, color, national origin, age, disability, sex, sexual orientation, or gender identity.            Thank you!     Thank you for choosing [facility]  for your care. Our goal is always to provide you with excellent care. Hearing back from our patients is one way we can continue to improve our services. Please take a few minutes to complete the written survey that you may receive in the mail after your visit with us. Thank you!             Your Updated Medication List - Protect others around you: Learn how to safely use, store and throw away your medicines at www.disposemymeds.org.          This list is accurate as of: 1/2/18 11:59 PM.  Always use your most recent med list.                   Brand Name Dispense Instructions for use Diagnosis    methylPREDNISolone 4 MG tablet    MEDROL DOSEPAK    21 tablet    Follow package instructions    Low back pain, unspecified back pain laterality, unspecified chronicity, with sciatica presence unspecified, Pain, radicular, lumbar       TYLENOL PO      Take 1,000 mg by mouth every 8 hours as needed for mild pain or fever

## 2018-01-02 NOTE — LETTER
"  1/2/2018      RE: Maria De Jesus Hess  [address]  [place] [address]       Maria De Jesus Hess  is a 31 year old female presents today for follow-up nutrition consultation.   [facility]  She is now doubting that she needs to go to the house.      Food preferences:  No red meat, no pork, no meat with skins and bones, no regular milk, no colon.  Ok with other meat, almond milk, Fairlife milk, miracle whilp  Challenge foods:  Grains, deserts, processed foods, rice, potatoes, salad dressing.  Meal plan total:  Grain=8, Protein=9, milk=3, veggies=3, fruit=3, fat=3, dessert=1  Energy needs:  0387-8820 kcals, 360-420g carbs, 96-120g protein, 50-65g fat.        Vitals:  Wt Readings from Last 4 Encounters:   11/08/17 54.1 kg (119 lb 3.2 oz)   10/11/17 55 kg (121 lb 4.1 oz)   09/28/17 53.3 kg (117 lb 6.4 oz)   09/22/17 55.1 kg (121 lb 8 oz)       Nutrition History  Patient is on a regular  diet at home.  9:45:  none  12:30:  2 eggs with salsa  17:30:  Salad:  Spinach, salsa, cheese, 1/2 avocado, 2 string  Cheese  Bedtime snack: Greek yogurt, almonds, and 3 Tbsp potato starch     Physical Activity  Ran 50 miles last week    Time with Patient: 60 minutes    Nutrition  DX:.   1. Dietary counseling    2. Anorexia           Nutrition Goals:   1: She will have \"something\" for breakfast tomorrow as well as something before her medical appointment [facility].  2: Follow through with recommendation at the [facility].   Long discussion on why she needs higher level of care and more help.   Continues to use running as a distraction for not going into the house.  Discussed importance to be open minded and to learn how to fuel her body.      Mary Song, MS, RD, CSSD, LD  [facility]      "

## 2018-01-02 NOTE — PROGRESS NOTES
"Maria De Jesus Hess  is a 31 year old female presents today for follow-up nutrition consultation.   [facility].  She is now doubting that she needs to go to the house.      Food preferences:  No red meat, no pork, no meat with skins and bones, no regular milk, no colon.  Ok with other meat, almond milk, Fairlife milk, miracle whilp  Challenge foods:  Grains, deserts, processed foods, rice, potatoes, salad dressing.  Meal plan total:  Grain=8, Protein=9, milk=3, veggies=3, fruit=3, fat=3, dessert=1  Energy needs:  7338-0585 kcals, 360-420g carbs, 96-120g protein, 50-65g fat.        Vitals:  Wt Readings from Last 4 Encounters:   11/08/17 54.1 kg (119 lb 3.2 oz)   10/11/17 55 kg (121 lb 4.1 oz)   09/28/17 53.3 kg (117 lb 6.4 oz)   09/22/17 55.1 kg (121 lb 8 oz)       Nutrition History  Patient is on a regular  diet at home.  9:45:  none  12:30:  2 eggs with salsa  17:30:  Salad:  Spinach, salsa, cheese, 1/2 avocado, 2 string  Cheese  Bedtime snack: Greek yogurt, almonds, and 3 Tbsp potato starch     Physical Activity  Ran 50 miles last week    Time with Patient: 60 minutes    Nutrition  DX:.   1. Dietary counseling    2. Anorexia           Nutrition Goals:   1: She will have \"something\" for breakfast tomorrow as well as something before her medical appointment [facility].  2: Follow through with recommendation at [facility].   Long discussion on why she needs higher level of care and more help.   Continues to use running as a distraction for not going into the house.  Discussed importance to be open minded and to learn how to fuel her body.      Mary Song, MS, RD, CSSD, LD   HEALTH    "

## 2018-01-31 ENCOUNTER — ALLIED HEALTH/NURSE VISIT (OUTPATIENT)
Dept: INTERNAL MEDICINE | Facility: CLINIC | Age: 33
End: 2018-01-31
Payer: COMMERCIAL

## 2018-01-31 DIAGNOSIS — F50.019 ANOREXIA NERVOSA, RESTRICTING TYPE: ICD-10-CM

## 2018-01-31 DIAGNOSIS — Z71.3 DIETARY COUNSELING: Primary | ICD-10-CM

## 2018-01-31 NOTE — MR AVS SNAPSHOT
After Visit Summary   1/31/2018    Maria De Jesus Hess    MRN: [id number]           Patient Information     Date Of Birth          1985        Visit Information        Provider Department      1/31/2018 2:00 PM [facility]        Today's Diagnoses     Dietary counseling    -  1    Anorexia nervosa, restricting type           Follow-ups after your visit        Your next 10 appointments already scheduled     Feb 07, 2018  1:00 PM CST   NUTRITION VISIT with [facility] ([facility])    [address] [place] [address] [phone]            Feb 14, 2018 12:00 PM CST   NUTRITION VISIT with [facility] ([facility])    [address] [place] [id number] [phone]            Feb 21, 2018  1:00 PM CST   NUTRITION VISIT with [facility] ([facility])    [address] [place] [id number] [phone]            Mar 07, 2018  1:30 PM CST   NUTRITION VISIT with [facility] ([facility])    [address] [place] [id number] [phone]            Mar 14, 2018  1:00 PM CDT   NUTRITION VISIT with [facility] ([facility])    [address] [place] [phone] [phone]            Mar 21, 2018  1:30 PM CDT   NUTRITION VISIT with [facility] ([facility])    [address] [phone]            Mar 28, 2018  1:00 PM CDT   NUTRITION VISIT with [facility] ([facility])    [address] [facility]  5th Floor  [facility] [address] [phone]              Who to contact     Please call your clinic at [phone] to:    Ask questions about your health    Make or cancel appointments    Discuss your medicines    Learn about your test results    Speak to your doctor   If you have compliments or concerns about an experience at your clinic, or if you wish to file a complaint, please contact [facility] Patient Relations at [phone] or email us at [email or web address]         Additional Information About Your Visit        Userscout Information     Userscout gives you secure access to your electronic health record. If you see a primary care provider, you can also send messages to your care team and make appointments. If you have questions, please call your primary care clinic.  If you do not have a primary care provider, please call [phone] and they will assist you.      Userscout is an electronic gateway that provides easy, online access to your medical records. With Userscout, you can request a clinic appointment, read your test results, renew a prescription or communicate with your care team.     To access your existing account, please contact your [facility] or call [phone] for assistance.        Care EveryWhere ID     This is your Care EveryWhere ID. This could be used by other organizations to access your [place] medical records  [id number]         Blood Pressure from Last 3 Encounters:   09/22/17 (!) 136/91   08/23/17 116/80   02/02/17 138/87    Weight from Last 3 Encounters:   11/08/17 54.1 kg (119 lb 3.2 oz)   10/11/17 55 kg (121 lb 4.1 oz)   09/28/17 53.3 kg (117 lb 6.4 oz)              We Performed the Following     MNT INDIVIDUAL F/U REASSESS, EA 15 MIN        Primary Care Provider Fax #    Physician No Ref-Primary [phone]       No address on  file        Equal Access to Services     JACKELYN JOSE GUADALUPE : Hadii lalo garcia carlos enrique Bonilla, wabusterda luqhudson, qazaria joonsusanyenni jones, delgado tadeokarlyfiliberto dinh. So [facility] [phone].    ATENCIÓN: Si habla español, tiene a briscoe disposición servicios gratuitos de asistencia lingüística. Llame al [phone].    We comply with applicable federal civil rights laws and [place] laws. We do not discriminate on the basis of race, color, national origin, age, disability, sex, sexual orientation, or gender identity.            Thank you!     Thank you for choosing [facility]  for your care. Our goal is always to provide you with excellent care. Hearing back from our patients is one way we can continue to improve our services. Please take a few minutes to complete the written survey that you may receive in the mail after your visit with us. Thank you!             Your Updated Medication List - Protect others around you: Learn how to safely use, store and throw away your medicines at www.disposemymeds.org.          This list is accurate as of 1/31/18  3:11 PM.  Always use your most recent med list.                   Brand Name Dispense Instructions for use Diagnosis    methylPREDNISolone 4 MG tablet    MEDROL DOSEPAK    21 tablet    Follow package instructions    Low back pain, unspecified back pain laterality, unspecified chronicity, with sciatica presence unspecified, Pain, radicular, lumbar       TYLENOL PO      Take 1,000 mg by mouth every 8 hours as needed for mild pain or fever

## 2018-01-31 NOTE — PROGRESS NOTES
"Maria De Jesus Hess  is a 31 year old female presents today for follow-up nutrition consultation.   [facility] [place] 1/3/18 and stayed for two weeks. She has had a very difficult month and still struggles with over exercising and restricting.      Food preferences:  No red meat, no pork, no meat with skins and bones, no regular milk, no colon.  Ok with other meat, almond milk, Fairlife milk, miracle whilp  Challenge foods:  Grains, deserts, processed foods, rice, potatoes, salad dressing.  Meal plan total:  Grain=8, Protein=9, milk=3, veggies=3, fruit=3, fat=3, dessert=1  Energy needs:  3382-7761 kcals, 360-420g carbs, 96-120g protein, 50-65g fat.        Vitals:  Wt Readings from Last 4 Encounters:   11/08/17 54.1 kg (119 lb 3.2 oz)   10/11/17 55 kg (121 lb 4.1 oz)   09/28/17 53.3 kg (117 lb 6.4 oz)   09/22/17 55.1 kg (121 lb 8 oz)       Nutrition History  Patient is on a regular  diet at home.  9:45:  none  12:30:  2 eggs with salsa  17:30:  Salad:  Spinach, salsa, cheese, 1/2 avocado, 2 string  Cheese  Bedtime snack: Greek yogurt, almonds, and 3 Tbsp potato starch     Physical Activity  Technically not allowed to run but has been running when she gets a pass    Time with Patient: 60 minutes    Nutrition  DX:.   1. Dietary counseling    2. Anorexia nervosa, restricting type           Nutrition Goals:   1: She will have \"something\" for breakfast tomorrow.  2: Follow through with [facility].   Long discussion on why she needs higher level of care and more help.   Continues to use running as a distraction for not going into the house.  Discussed importance to be open minded and to learn how to fuel her body.      Mary Song, MS, RD, CSSD, LD  [facility]    "

## 2018-02-07 ENCOUNTER — ALLIED HEALTH/NURSE VISIT (OUTPATIENT)
Dept: INTERNAL MEDICINE | Facility: CLINIC | Age: 33
End: 2018-02-07
Payer: COMMERCIAL

## 2018-02-07 DIAGNOSIS — F50.019 ANOREXIA NERVOSA, RESTRICTING TYPE: ICD-10-CM

## 2018-02-07 DIAGNOSIS — Z71.3 DIETARY COUNSELING: Primary | ICD-10-CM

## 2018-02-07 NOTE — PROGRESS NOTES
"Maria De Jesus Hess  is a 31 year old female presents today for follow-up nutrition consultation.   [facility] [place] 1/3/18 and stayed for two weeks. She has had a very difficult month and still struggles with over exercising and restricting.  Maria De Jesus continues to run despite abnormal lab values.    Food preferences:  No red meat, no pork, no meat with skins and bones, no regular milk, no colon.  Ok with other meat, almond milk, Fairlife milk, miracle whilp  Challenge foods:  Grains, deserts, processed foods, rice, potatoes, salad dressing.  Meal plan total:  Grain=8, Protein=9, milk=3, veggies=3, fruit=3, fat=3, dessert=1  Energy needs:  9997-2110 kcals, 360-420g carbs, 96-120g protein, 50-65g fat.        Vitals:  Wt Readings from Last 4 Encounters:   11/08/17 119 lb 3.2 oz (54.1 kg)   10/11/17 121 lb 4.1 oz (55 kg)   09/28/17 117 lb 6.4 oz (53.3 kg)   09/22/17 121 lb 8 oz (55.1 kg)       Nutrition History  Patient is on a regular  diet at home.  9:45:  none  12:30:  2 eggs with salsa  17:30:  Salad:  Spinach, salsa, cheese, 1/2 avocado, 2 string  Cheese  Bedtime snack: Greek yogurt, almonds, and 3 Tbsp potato starch     Physical Activity  Technically not allowed to run but has been running when she gets a pass    Time with Patient: 60 minutes    Nutrition  DX:.   1. Dietary counseling    2. Anorexia nervosa, restricting type           Nutrition Goals:   1: She will have \"something\" for breakfast tomorrow.  2: Follow through with recommendation of the medical team on running and eating.    Mary Song, MS, RD, CSSD, LD  [facility]    "

## 2018-02-07 NOTE — MR AVS SNAPSHOT
After Visit Summary   2/7/2018    Maria De Jesus Hess    MRN: [id number]           Patient Information     Date Of Birth          1985        Visit Information        Provider Department      2/7/2018 1:00 PM Mary Song RD [facility]        Today's Diagnoses     Dietary counseling    -  1    Anorexia nervosa, restricting type           Follow-ups after your visit        Your next 10 appointments already scheduled     Feb 14, 2018 12:00 PM CST   NUTRITION VISIT with [facility]INA [facility] ([facility])    [address] [place] [phone]            Feb 21, 2018  1:00 PM CST   NUTRITION VISIT with CRISTINA [facility] ([facility])    [address] [place] [phone]            Mar 07, 2018  1:30 PM CST   NUTRITION VISIT with [facility]INA [facility] ([facility])    [address] [place] [address] [phone]            Mar 14, 2018  1:00 PM CDT   NUTRITION VISIT with [facility]INA [facility] ([facility])    [address] [place] [address] [phone]            Mar 21, 2018  1:30 PM CDT   NUTRITION VISIT with [facility]RISTINA [facility] ([facility])    [address] [place] [address] [phone]            Mar 28, 2018  1:00 PM CDT   NUTRITION VISIT with [facility]INA [facility] ([facility])    [address] [place] [address] [phone]              Who to contact     Please call your clinic at [phone] to:    Ask questions about your  health    Make or cancel appointments    Discuss your medicines    Learn about your test results    Speak to your doctor   If you have compliments or concerns about an experience at your clinic, or if you wish to file a complaint, please contact [facility] Patient Relations at [phone] or email us at [email or web address]         Additional Information About Your Visit        Qooplhart Information     [employer] gives you secure access to your electronic health record. If you see a primary care provider, you can also send messages to your care team and make appointments. If you have questions, please call your primary care clinic.  If you do not have a primary care provider, please call [phone] and they will assist you.      [employer] is an electronic gateway that provides easy, online access to your medical records. With [employer], you can request a clinic appointment, read your test results, renew a prescription or communicate with your care team.     To access your existing account, please contact your [facility] or call [phone] for assistance.        Care EveryWhere ID     This is your Care EveryWhere ID. This could be used by other organizations to access your [place] medical records  [id number]         Blood Pressure from Last 3 Encounters:   09/22/17 (!) 136/91   08/23/17 116/80   02/02/17 138/87    Weight from Last 3 Encounters:   11/08/17 119 lb 3.2 oz (54.1 kg)   10/11/17 121 lb 4.1 oz (55 kg)   09/28/17 117 lb 6.4 oz (53.3 kg)              We Performed the Following     MNT INDIVIDUAL F/U REASSESS, EA 15 MIN        Primary Care Provider Fax #    Physician No Ref-Primary [phone]       No address on file        Equal Access to Services     CESAR SHI : Desi Bonilla, lexi tsang, delgado gomez. So [facility] [phone].    ATENCIÓN: nia Billings  a briscoe disposición servicios gratuitos de asistencia lingüística. Zev moreno [phone].    We comply with applicable federal civil rights laws and [place] laws. We do not discriminate on the basis of race, color, national origin, age, disability, sex, sexual orientation, or gender identity.            Thank you!     Thank you for choosing [facility]  for your care. Our goal is always to provide you with excellent care. Hearing back from our patients is one way we can continue to improve our services. Please take a few minutes to complete the written survey that you may receive in the mail after your visit with us. Thank you!             Your Updated Medication List - Protect others around you: Learn how to safely use, store and throw away your medicines at www.disposemymeds.org.          This list is accurate as of 2/7/18  2:18 PM.  Always use your most recent med list.                   Brand Name Dispense Instructions for use Diagnosis    methylPREDNISolone 4 MG tablet    MEDROL DOSEPAK    21 tablet    Follow package instructions    Low back pain, unspecified back pain laterality, unspecified chronicity, with sciatica presence unspecified, Pain, radicular, lumbar       TYLENOL PO      Take 1,000 mg by mouth every 8 hours as needed for mild pain or fever

## 2018-02-14 ENCOUNTER — ALLIED HEALTH/NURSE VISIT (OUTPATIENT)
Dept: INTERNAL MEDICINE | Facility: CLINIC | Age: 33
End: 2018-02-14
Payer: COMMERCIAL

## 2018-02-14 DIAGNOSIS — E46: ICD-10-CM

## 2018-02-14 DIAGNOSIS — R63.0 ANOREXIA: ICD-10-CM

## 2018-02-14 DIAGNOSIS — Z71.3 DIETARY COUNSELING: Primary | ICD-10-CM

## 2018-02-14 NOTE — MR AVS SNAPSHOT
After Visit Summary   2/14/2018    Maria De Jesus Hess    MRN: [id number]           Patient Information     Date Of Birth          1985        Visit Information        Provider Department      2/14/2018 12:00 PM Mary Song RD [facility]        Today's Diagnoses     Dietary counseling    -  1    Protein-calorie undernutrition (H)        Anorexia           Follow-ups after your visit        Your next 10 appointments already scheduled     Feb 21, 2018  1:00 PM CST   NUTRITION VISIT with CRISTINA Morales [facility] ([facility])    [address] [place] [phone]            Mar 07, 2018  1:30 PM CST   NUTRITION VISIT with CRISTINA Morales [facility] ([facility])    [address] [place] [phone]            Mar 14, 2018  1:00 PM CDT   NUTRITION VISIT with CRISTINA Morales [facility] ([facility])    [address] [place] [phone]            Mar 21, 2018  1:30 PM CDT   NUTRITION VISIT with CRISTINA Morales [facility] ([facility])    [address] [place] [address] [phone]            Mar 28, 2018  1:00 PM CDT   NUTRITION VISIT with CRISTINA Morales [facility] ([facility])    [address] [place] [phone]              Who to contact     Please call your clinic at [phone] to:    Ask questions about your health    Make or cancel appointments    Discuss your medicines    Learn about your test results    Speak to your doctor            Additional Information About Your Visit        MyChart Information     [employer] gives  you secure access to your electronic health record. If you see a primary care provider, you can also send messages to your care team and make appointments. If you have questions, please call your primary care clinic.  If you do not have a primary care provider, please call [phone] and they will assist you.      [employer] is an electronic gateway that provides easy, online access to your medical records. With [employer], you can request a clinic appointment, read your test results, renew a prescription or communicate with your care team.     To access your existing account, please contact your [facility] or call [phone] for assistance.        Care EveryWhere ID     This is your Care EveryWhere ID. This could be used by other organizations to access your [place] medical records  [id number]         Blood Pressure from Last 3 Encounters:   09/22/17 (!) 136/91   08/23/17 116/80   02/02/17 138/87    Weight from Last 3 Encounters:   11/08/17 54.1 kg (119 lb 3.2 oz)   10/11/17 55 kg (121 lb 4.1 oz)   09/28/17 53.3 kg (117 lb 6.4 oz)              We Performed the Following     MNT INDIVIDUAL F/U REASSESS, EA 15 MIN        Primary Care Provider Fax #    Physician No Ref-Primary [phone]       No address on file        Equal Access to Services     CESAR SHI : Hadii lalo ku hadasho Soomaali, waaxda luqadaha, qaybta kaalmada adeegyada, delgado moreno . So [facility] [phone].    ATENCIÓN: Si habla español, tiene a briscoe disposición servicios gratuitos de asistencia lingüística. Llame al [phone].    We comply with applicable federal civil rights laws and [place] laws. We do not discriminate on the basis of race, color, national origin, age, disability, sex, sexual orientation, or gender identity.            Thank you!     Thank you for choosing [facility]  for your care. Our goal is always to provide you with excellent care.  Hearing back from our patients is one way we can continue to improve our services. Please take a few minutes to complete the written survey that you may receive in the mail after your visit with us. Thank you!             Your Updated Medication List - Protect others around you: Learn how to safely use, store and throw away your medicines at www.disposemymeds.org.          This list is accurate as of 2/14/18  1:29 PM.  Always use your most recent med list.                   Brand Name Dispense Instructions for use Diagnosis    methylPREDNISolone 4 MG tablet    MEDROL DOSEPAK    21 tablet    Follow package instructions    Low back pain, unspecified back pain laterality, unspecified chronicity, with sciatica presence unspecified, Pain, radicular, lumbar       TYLENOL PO      Take 1,000 mg by mouth every 8 hours as needed for mild pain or fever

## 2018-02-19 DIAGNOSIS — M53.3 SACRAL PAIN: Primary | ICD-10-CM

## 2018-02-21 ENCOUNTER — ALLIED HEALTH/NURSE VISIT (OUTPATIENT)
Dept: INTERNAL MEDICINE | Facility: CLINIC | Age: 33
End: 2018-02-21
Payer: COMMERCIAL

## 2018-02-21 DIAGNOSIS — Z71.3 DIETARY COUNSELING: Primary | ICD-10-CM

## 2018-02-21 DIAGNOSIS — F50.019 ANOREXIA NERVOSA, RESTRICTING TYPE: ICD-10-CM

## 2018-02-21 NOTE — PROGRESS NOTES
Maria De Jesus Hess  is a 31 year old female presents today for follow-up nutrition consultation.   [facility] started [facility] [place] 1/3/18 and stayed for two weeks. She has had a very difficult month and still struggles with over exercising and restricting.  Maria De Jesus is still sturggling with underfueling and over exercising. She will be leaving IDP this week.    Food preferences:  No red meat, no pork, no meat with skins and bones, no regular milk, no colon.  Ok with other meat, almond milk, Fairlife milk, miracle whilp  Challenge foods:  Grains, deserts, processed foods, rice, potatoes, salad dressing.  Meal plan total:  Grain=8, Protein=9, milk=3, veggies=3, fruit=3, fat=3, dessert=1  Energy needs:  5899-3581 kcals, 360-420g carbs, 96-120g protein, 50-65g fat.        Vitals:  Wt Readings from Last 4 Encounters:   11/08/17 54.1 kg (119 lb 3.2 oz)   10/11/17 55 kg (121 lb 4.1 oz)   09/28/17 53.3 kg (117 lb 6.4 oz)   09/22/17 55.1 kg (121 lb 8 oz)       Nutrition History  Patient is on a regular  diet at home.  9:45:  none  12:30:  2 eggs with salsa  17:30:  Salad:  Spinach, salsa, cheese, 1/2 avocado, 2 string  Cheese  Bedtime snack: Greek yogurt, almonds, and 3 Tbsp potato starch     Physical Activity  Technically not allowed to run but has been running when she gets a pass    Time with Patient: 60 minutes    Nutrition  DX:.   1. Dietary counseling    2. Anorexia nervosa, restricting type           Nutrition Goals:   1: Read How to Eat  2: Follow through with recommendation of the medical team on running and eating.    Mary Song, MS, RD, CSSD, LD  [facility]

## 2018-02-21 NOTE — MR AVS SNAPSHOT
After Visit Summary   2/21/2018    Maria De Jesus Hess    MRN: [id number]           Patient Information     Date Of Birth          1985        Visit Information        Provider Department      2/21/2018 1:00 PM Mary Song RD [facility]        Today's Diagnoses     Dietary counseling    -  1    Anorexia nervosa, restricting type           Follow-ups after your visit        Your next 10 appointments already scheduled     Mar 07, 2018  1:30 PM CST   NUTRITION VISIT with CRISTINA Morales [facility] ([facility])    [address] [place] [address] [phone]            Mar 14, 2018  1:00 PM CDT   NUTRITION VISIT with CRISTINA Morales [facility] ([facility])    [address] [place] [address] [phone]            Mar 21, 2018  1:30 PM CDT   NUTRITION VISIT with CRISTINA Morales [facility] ([facility])    [address] [place] [address] [phone]            Mar 28, 2018  1:00 PM CDT   NUTRITION VISIT with CRISTINA Morales [facility] ([facility])    [address] [place] [phone]              Who to contact     Please call your clinic at [phone] to:    Ask questions about your health    Make or cancel appointments    Discuss your medicines    Learn about your test results    Speak to your doctor            Additional Information About Your Visit        Berry Kitchenhart Information     [employer] gives you secure access to your electronic health record. If you see a primary care provider, you can also send messages to your care team and make appointments. If you have questions, please call your primary care clinic.  If you do not have a primary  care provider, please call [phone] and they will assist you.      [employer]visor[employer]CT is an electronic gateway that provides easy, online access to your medical records. With [employer]CONNECT, you can request a clinic appointment, read your test results, renew a prescription or communicate with your care team.     To access your existing account, please contact your [facility] or call [phone] for assistance.        Care EveryWhere ID     This is your Care EveryWhere ID. This could be used by other organizations to access your [place] medical records  [id number]         Blood Pressure from Last 3 Encounters:   09/22/17 (!) 136/91   08/23/17 116/80   02/02/17 138/87    Weight from Last 3 Encounters:   11/08/17 54.1 kg (119 lb 3.2 oz)   10/11/17 55 kg (121 lb 4.1 oz)   09/28/17 53.3 kg (117 lb 6.4 oz)              We Performed the Following     MNT INDIVIDUAL F/U REASSESS, EA 15 MIN        Primary Care Provider Fax #    Physician No Ref-Primary [phone]       No address on file        Equal Access to Services     [facility] : Hadii lalo monaco Sojamie, waaxda ludelfino, qaybta kaalrosa maria jones, delgado moreno . So [facility] [phone].    ATENCIÓN: Si habla español, tiene a briscoe disposición servicios gratuitos de asistencia lingüística. Zev al [phone].    We comply with applicable federal civil rights laws and [place] laws. We do not discriminate on the basis of race, color, national origin, age, disability, sex, sexual orientation, or gender identity.            Thank you!     Thank you for choosing [facility]  for your care. Our goal is always to provide you with excellent care. Hearing back from our patients is one way we can continue to improve our services. Please take a few minutes to complete the written survey that you may receive in the mail after your visit with us. Thank you!             Your Updated Medication List -  Protect others around you: Learn how to safely use, store and throw away your medicines at www.disposemymeds.org.          This list is accurate as of 2/21/18  2:16 PM.  Always use your most recent med list.                   Brand Name Dispense Instructions for use Diagnosis    methylPREDNISolone 4 MG tablet    MEDROL DOSEPAK    21 tablet    Follow package instructions    Low back pain, unspecified back pain laterality, unspecified chronicity, with sciatica presence unspecified, Pain, radicular, lumbar       TYLENOL PO      Take 1,000 mg by mouth every 8 hours as needed for mild pain or fever

## 2018-03-07 ENCOUNTER — ALLIED HEALTH/NURSE VISIT (OUTPATIENT)
Dept: INTERNAL MEDICINE | Facility: CLINIC | Age: 33
End: 2018-03-07
Payer: COMMERCIAL

## 2018-03-07 DIAGNOSIS — F50.019 ANOREXIA NERVOSA, RESTRICTING TYPE: ICD-10-CM

## 2018-03-07 DIAGNOSIS — Z71.3 DIETARY COUNSELING: Primary | ICD-10-CM

## 2018-03-07 NOTE — PROGRESS NOTES
Maria De Jesus Hess  is a 31 year old female presents today for follow-up nutrition consultation. Called insurance and approved for 1 more week in [facility]. She is really struggling with food and physical activity. She has pain in pelvic area, same place as her old injury.       Food preferences:  No red meat, no pork, no meat with skins and bones, no regular milk, no colon.  Ok with other meat, almond milk, Fairlife milk, miracle whilp  Challenge foods:  Grains, deserts, processed foods, rice, potatoes, salad dressing.  Meal plan total:  Grain=8, Protein=9, milk=3, veggies=3, fruit=3, fat=3, dessert=1  Energy needs:  4811-5676 kcals, 360-420g carbs, 96-120g protein, 50-65g fat.        Vitals:  Wt Readings from Last 4 Encounters:   11/08/17 54.1 kg (119 lb 3.2 oz)   10/11/17 55 kg (121 lb 4.1 oz)   09/28/17 53.3 kg (117 lb 6.4 oz)   09/22/17 55.1 kg (121 lb 8 oz)       Nutrition History  Patient is on a regular  diet at home.  Recall made on Sunday when she purged for first time.   Breakfast: [employer]bucks [place] machiatto   10:00 BiPro (drink)  3:00:  2 eggs + Kombucha  6:30:  Salad:  Spinach, salsa, cheese, 1/2 avocado, 2 string  Cheese  Bedtime snack: Greek yogurt + Rx bar     Felt really uncomfortable and purge for first time.     Physical Activity  Running - 5-20 miles per day     Time with Patient: 60 minutes    Nutrition  DX:.   1. Dietary counseling    2. Anorexia nervosa, restricting type           Nutrition Goals:   1: Continue recommendations from [facility]  2: Communicate with therapist at [facility] weekly    Long Term Goals: Gain a healthy relationship with food and properly fuel for physical activity.     Mary Song, MS, RD, CSSD, LD  [facility]

## 2018-03-07 NOTE — MR AVS SNAPSHOT
After Visit Summary   3/7/2018    Maria De Jesus Hess    MRN: [id number]           Patient Information     Date Of Birth          1985        Visit Information        Provider Department      3/7/2018 1:30 PM Mary Song RD [facility]        Today's Diagnoses     Dietary counseling    -  1    Anorexia nervosa, restricting type           Follow-ups after your visit        Your next 10 appointments already scheduled     Mar 14, 2018  1:00 PM CDT   NUTRITION VISIT with CRISTINA Morales [facility] ([facility])    [address] [place] [address]  [address] [facility] [address]   [phone]            Mar 14, 2018  2:40 PM CDT   (Arrive by 2:25 PM)   Return Visit with Ludy Betts MD   [facility] ([facility])    [address] [place] [address]  [address] [facility] [address]   [phone]            Mar 23, 2018  4:30 PM CDT   (Arrive by 4:15 PM)   Return Nurtrition Mary with CRISTINA Morales [facility] ([facility])    [address] [place] [address]  [address] [facility] [address] [phone]            Mar 28, 2018  1:00 PM CDT   NUTRITION VISIT with CRISTINA Morales [facility] ([facility])    [address] [facility]  [address] [facility] [address]   [phone]            Apr 12, 2018  1:00 PM CDT   (Arrive by 12:45 PM)   Return Nurtrition Mary with CRISTINA Morales [facility] ([facility])    [address] [place] [address]  [address] [facility] [address]   [phone]            Apr 18, 2018  1:30 PM CDT   (Arrive by 1:15 PM)   Return Nurtrition Mary with CRISTINA Morales [facility] ([facility])    [address] [place] [address]  [address] [facility]  [phone]            Apr 25, 2018  1:30 PM CDT   (Arrive by 1:15 PM)   Return Amor Hernandez with CRISTINA Morales [facility] ([facility])    [address] [place] [phone]            May 02, 2018  1:30 PM CDT   (Arrive by 1:15 PM)   Return Amor Hernandez with CRISTINA Morales [facility] [employer] ([facility])    [address] [place] [phone] [phone]              Who to contact     Please call your clinic at [phone] to:    Ask questions about your health    Make or cancel appointments    Discuss your medicines    Learn about your test results    Speak to your doctor            Additional Information About Your Visit        Green [employer] Grow[employer] Information     [employer] gives you secure access to your electronic health record. If you see a primary care provider, you can also send messages to your care team and make appointments. If you have questions, please call your primary care clinic.  If you do not have a primary care provider, please call [phone] and they will assist you.      [employer] is an electronic gateway that provides easy, online access to your medical records. With [employer], you can request a clinic appointment, read your test results, renew a prescription or communicate with your care team.     To access your existing account, please contact your [facility] or call [phone] for assistance.        Care EveryWhere ID     This is your Care EveryWhere ID. This could be used by other organizations to access your [place] medical records  [id number]         Blood Pressure from Last 3 Encounters:   09/22/17 (!) 136/91   08/23/17 116/80   02/02/17 138/87    Weight from Last 3 Encounters:   11/08/17 54.1 kg (119 lb 3.2 oz)   10/11/17 55 kg (121 lb 4.1 oz)   09/28/17 53.3 kg (117 lb 6.4 oz)               We Performed the Following     MNT INDIVIDUAL F/U REASSESS, EA 15 MIN        Primary Care Provider Fax #    Physician No Ref-Primary [phone]       No address on file        Equal Access to Services     CESAR SHI : Desi lalo garcia carlos enrique Bonilla, lexi luqhudson, deniseta kasusanda karen, delgado dawnedouard . So [facility] [phone].    ATENCIÓN: Si habla español, tiene a briscoe disposición servicios gratuitos de asistencia lingüística. Llame al [phone].    We comply with applicable federal civil rights laws and [place] laws. We do not discriminate on the basis of race, color, national origin, age, disability, sex, sexual orientation, or gender identity.            Thank you!     Thank you for choosing [facility]  for your care. Our goal is always to provide you with excellent care. Hearing back from our patients is one way we can continue to improve our services. Please take a few minutes to complete the written survey that you may receive in the mail after your visit with us. Thank you!             Your Updated Medication List - Protect others around you: Learn how to safely use, store and throw away your medicines at www.disposemymeds.org.          This list is accurate as of 3/7/18  3:50 PM.  Always use your most recent med list.                   Brand Name Dispense Instructions for use Diagnosis    methylPREDNISolone 4 MG tablet    MEDROL DOSEPAK    21 tablet    Follow package instructions    Low back pain, unspecified back pain laterality, unspecified chronicity, with sciatica presence unspecified, Pain, radicular, lumbar       TYLENOL PO      Take 1,000 mg by mouth every 8 hours as needed for mild pain or fever

## 2018-03-14 ENCOUNTER — ALLIED HEALTH/NURSE VISIT (OUTPATIENT)
Dept: INTERNAL MEDICINE | Facility: CLINIC | Age: 33
End: 2018-03-14
Payer: COMMERCIAL

## 2018-03-14 DIAGNOSIS — F50.019 ANOREXIA NERVOSA, RESTRICTING TYPE: ICD-10-CM

## 2018-03-14 DIAGNOSIS — Z71.3 DIETARY COUNSELING: Primary | ICD-10-CM

## 2018-03-14 NOTE — PROGRESS NOTES
Maria De Jesus Hess  is a 31 year old female presents today for follow-up nutrition consultation. She recently got injured (in recurrent injury place in pelvis) and is painful to run/exercise. She only eats one meal or snack at home, otherwise most of her meals are eaten at [facility]. She doesn't feel she has to fuel now that she isn't running.     Food preferences:  No red meat, no pork, no meat with skins and bones, no regular milk, no colon.  Ok with other meat, almond milk, Fairlife milk, miracle whilp  Challenge foods:  Grains, deserts, processed foods, rice, potatoes, salad dressing.  Meal plan total:  Grain=8, Protein=9, milk=3, veggies=3, fruit=3, fat=3, dessert=1  Energy needs:  4983-3917 kcals, 360-420g carbs, 96-120g protein, 50-65g fat.        Vitals:  Wt Readings from Last 4 Encounters:   11/08/17 119 lb 3.2 oz (54.1 kg)   10/11/17 121 lb 4.1 oz (55 kg)   09/28/17 117 lb 6.4 oz (53.3 kg)   09/22/17 121 lb 8 oz (55.1 kg)       Nutrition History  Patient is on a regular  diet at home.    This recall was from last week:  Recall made on Sunday when she purged for first time.   Breakfast: [employer] [place] machiatto   10:00 BiPro (drink)  3:00:  2 eggs + Kombucha  6:30:  Salad:  Spinach, salsa, cheese, 1/2 avocado, 2 string  Cheese  Bedtime snack: Greek yogurt + Rx bar     No dietary recall done at this appointment.     Physical Activity  Running - 5-20 miles per day typically, but recently got injured and cannot run or exercise. Will be getting an MRI to find the etiology of injury.     Time with Patient: 60 minutes    Nutrition  DX:.   1. Dietary counseling    2. Anorexia nervosa, restricting type           Nutrition Goals:   1: Continue recommendations from [facility]  2: Communicate with therapist at [facility] weekly  3. Consume one meal/snack in the morning every day  4. Start using [facility]     Long Term Goals: Gain a healthy relationship with food and properly fuel for physical  activity.     Mary Song, MS, RD, CSSD, LD  [facility]

## 2018-03-14 NOTE — MR AVS SNAPSHOT
After Visit Summary   3/14/2018    Maria De Jesus Hess    MRN: [id number]           Patient Information     Date Of Birth          1985        Visit Information        Provider Department      3/14/2018 1:00 PM Mary Song RD M [facility]        Today's Diagnoses     Dietary counseling    -  1    Anorexia nervosa, restricting type           Follow-ups after your visit        Your next 10 appointments already scheduled     Mar 23, 2018  4:30 PM CDT   (Arrive by 4:15 PM)   Return Nurtrition Mary with CRISTINA Morales [facility] Urology and Inst for Prostate and Urologic Cancers ([facility])    [address] [place] [address]  [address] [facility] [address]   [phone]            Mar 28, 2018  1:00 PM CDT   NUTRITION VISIT with CRISTINA Morales [facility] ([facility])    [address] [place] [address]  [address] [facility] [address] [phone]            Apr 12, 2018  1:00 PM CDT   (Arrive by 12:45 PM)   Return Nurtrition Mary with CRISTINA Morales [facility] ([facility])    [address] [place] [address]  [address] [facility] [address] [phone]            Apr 18, 2018  1:30 PM CDT   (Arrive by 1:15 PM)   Return Nurtrition Mary with CRISTINA Morales [facility] ([facility])    [address]  [address] [facility] [address] [phone]            Apr 25, 2018  1:30 PM CDT   (Arrive by 1:15 PM)   Return Nurtrition Mary with CRISTINA Morales [facility] ([facility])    [address] [place] [address]  [address] [facility] [address] [phone]            May 02, 2018  1:30 PM CDT   (Arrive by 1:15 PM)   Return Nurtrition Mary with CRISTINA Morales [facility] ([facility])    [address] [place] [address]  [address]  [address] [facility] [address] [phone]              Who to contact     Please call your clinic at [phone] to:    Ask questions about your health    Make or cancel appointments    Discuss your medicines    Learn about your test results    Speak to your doctor            Additional Information About Your Visit        MyChart Information     Playhem gives you secure access to your electronic health record. If you see a primary care provider, you can also send messages to your care team and make appointments. If you have questions, please call your primary care clinic.  If you do not have a primary care provider, please call [phone] and they will assist you.      Playhem is an electronic gateway that provides easy, online access to your medical records. With Playhem, you can request a clinic appointment, read your test results, renew a prescription or communicate with your care team.     To access your existing account, please contact your [facility] or call [phone] for assistance.        Care EveryWhere ID     This is your Care EveryWhere ID. This could be used by other organizations to access your [place] medical records  [id number]         Blood Pressure from Last 3 Encounters:   09/22/17 (!) 136/91   08/23/17 116/80   02/02/17 138/87    Weight from Last 3 Encounters:   11/08/17 119 lb 3.2 oz (54.1 kg)   10/11/17 121 lb 4.1 oz (55 kg)   09/28/17 117 lb 6.4 oz (53.3 kg)              We Performed the Following     MNT INDIVIDUAL F/U REASSESS, EA 15 MIN        Primary Care Provider Fax #    Physician No Ref-Primary [phone]       No address on file        Equal Access to Services     CESAR SHI : Hadii aad ku hadashvance Sojamie, waaxda luqadaha, qaybta kaalmada karen, delgado moreno . So [facility] [phone].    ATENCIÓN: Si habla español, tiene a briscoe disposición servicios gratuitos de asistencia lingüística. Llame al  [phone].    We comply with applicable federal civil rights laws and [place] laws. We do not discriminate on the basis of race, color, national origin, age, disability, sex, sexual orientation, or gender identity.            Thank you!     Thank you for choosing [facility]  for your care. Our goal is always to provide you with excellent care. Hearing back from our patients is one way we can continue to improve our services. Please take a few minutes to complete the written survey that you may receive in the mail after your visit with us. Thank you!             Your Updated Medication List - Protect others around you: Learn how to safely use, store and throw away your medicines at www.disposemymeds.org.          This list is accurate as of 3/14/18  3:37 PM.  Always use your most recent med list.                   Brand Name Dispense Instructions for use Diagnosis    methylPREDNISolone 4 MG tablet    MEDROL DOSEPAK    21 tablet    Follow package instructions    Low back pain, unspecified back pain laterality, unspecified chronicity, with sciatica presence unspecified, Pain, radicular, lumbar       TYLENOL PO      Take 1,000 mg by mouth every 8 hours as needed for mild pain or fever

## 2018-03-22 ENCOUNTER — RADIANT APPOINTMENT (OUTPATIENT)
Dept: MRI IMAGING | Facility: CLINIC | Age: 33
End: 2018-03-22
Attending: FAMILY MEDICINE
Payer: COMMERCIAL

## 2018-03-22 DIAGNOSIS — M53.3 SACRAL PAIN: ICD-10-CM

## 2018-03-23 ENCOUNTER — OFFICE VISIT (OUTPATIENT)
Dept: UROLOGY | Facility: CLINIC | Age: 33
End: 2018-03-23
Payer: COMMERCIAL

## 2018-03-23 DIAGNOSIS — Z71.3 DIETARY COUNSELING: Primary | ICD-10-CM

## 2018-03-23 DIAGNOSIS — F50.019 ANOREXIA NERVOSA, RESTRICTING TYPE: ICD-10-CM

## 2018-03-23 NOTE — LETTER
"3/23/2018       RE: Maria De Jesus Hess  [address]  [facility] [address]     Dear Colleague,    Thank you for referring your patient, Maria De Jesus Hess, to the [facility] UROLOGY AND INST FOR PROSTATE AND UROLOGIC CANCERS at [facility]. Please see a copy of my visit note below.    Maria De Jesus Hess  is a 31 year old female presents today for follow-up nutrition consultation. She ate more regularly, breakfasts and lunches, because she needed take phosphorus medication with food X2 daily, but now that has stopped so she stopped eating breakfast. She mentioned she \"doesn't care\" about a lot of things in her life right now. Her body language indicated she was uncomfortable. She will start back to work next Monday, and said I'll will be the same, go back to the same pattern.     Food preferences:  No red meat, no pork, no meat with skins and bones, no regular milk, no colon.  Ok with other meat, almond milk, Fairlife milk, miracle whilp  Challenge foods:  Grains, deserts, processed foods, rice, potatoes, salad dressing.  Meal plan total:  Grain=8, Protein=9, milk=3, veggies=3, fruit=3, fat=3, dessert=1  Energy needs:  0655-8108 kcals, 360-420g carbs, 96-120g protein, 50-65g fat.        Vitals:  Wt Readings from Last 4 Encounters:   11/08/17 119 lb 3.2 oz (54.1 kg)   10/11/17 121 lb 4.1 oz (55 kg)   09/28/17 117 lb 6.4 oz (53.3 kg)   09/22/17 121 lb 8 oz (55.1 kg)       Nutrition History  Patient is on a regular  diet at home.    Diet recall:  13:00: kombucha, trail mix,  1750: spinach, salsa, 2 T hummus, 2 wayne, 1/2 C cottage cheese, 1/2 avocado  2000: 1 C greek yogurt, 2 T PB    Physical Activity  Decreased running to 5-6 miles per day due to injury but also doesn't find it brings her onofre.     Time with Patient: 60 minutes    Nutrition  DX:.   1. Dietary counseling    2. Anorexia nervosa, restricting type           Nutrition Goals:   1: [facility]  2: Communicate with therapist at [facility] weekly  3. Read book \"How to Eat\"    Long Term Goals: Gain a healthy relationship with food and properly fuel for physical activity.     Mary Song, MS, RD, CSSD, LD  [facility]    "

## 2018-03-23 NOTE — MR AVS SNAPSHOT
After Visit Summary   3/23/2018    Maria De Jesus Hess    MRN: [id number]           Patient Information     Date Of Birth          1985        Visit Information        Provider Department      3/23/2018 4:30 PM Mary Song RD M [facility] Urology and Inst for Prostate and Urologic Cancers        Today's Diagnoses     Dietary counseling    -  1    Anorexia nervosa, restricting type           Follow-ups after your visit        Your next 10 appointments already scheduled     Mar 28, 2018  1:00 PM CDT   NUTRITION VISIT with CRISTINA Morales [facility] ([facility])    [address] [place] [id number] [phone]            Apr 12, 2018  1:00 PM CDT   (Arrive by 12:45 PM)   Return Nurtrition Mary with CRISTINA Morales [facility] ([facility])    [address] [place] [id number] [phone]            Apr 18, 2018  1:30 PM CDT   (Arrive by 1:15 PM)   Return Nurtrition Mary with CRISTINA Morales [facility] ([facility])    [address] [place] [id number] [phone]            Apr 25, 2018  1:30 PM CDT   (Arrive by 1:15 PM)   Return Nurtrition Mary with CRISTINA Morales [facility] ([facility])    [address] [place] [id number] [phone]            May 02, 2018  1:30 PM CDT   (Arrive by 1:15 PM)   Return Nurtrition Mary with CRISTINA Morales [facility] ([facility])    [address] [place] [id number] [phone]              Who to contact     Please call your clinic at [phone] to:    Ask questions about your health    Make or cancel appointments    Discuss your medicines    Learn about your test  results    Speak to your doctor            Additional Information About Your Visit        [employer] Information     [employer] gives you secure access to your electronic health record. If you see a primary care provider, you can also send messages to your care team and make appointments. If you have questions, please call your primary care clinic.  If you do not have a primary care provider, please call [phone] and they will assist you.      [employer] is an electronic gateway that provides easy, online access to your medical records. With [employer], you can request a clinic appointment, read your test results, renew a prescription or communicate with your care team.     To access your existing account, please contact your [facility] or call [phone] for assistance.        Care EveryWhere ID     This is your Care EveryWhere ID. This could be used by other organizations to access your [place] medical records  [id number]         Blood Pressure from Last 3 Encounters:   09/22/17 (!) 136/91   08/23/17 116/80   02/02/17 138/87    Weight from Last 3 Encounters:   11/08/17 119 lb 3.2 oz (54.1 kg)   10/11/17 121 lb 4.1 oz (55 kg)   09/28/17 117 lb 6.4 oz (53.3 kg)              We Performed the Following     MNT INDIVIDUAL F/U REASSESS, EA 15 MIN        Primary Care Provider Fax #    Physician No Ref-Primary [phone]       No address on file        Equal Access to Services     CESAR SHI : Hadii lalo ku hadasho Soomaali, waaxda luqadaha, qaybta kaalmada karen, delgado moreno . So [place] [phone].    ATENCIÓN: Si habla español, tiene a briscoe disposición servicios gratuitos de asistencia lingüística. Llame al [phone].    We comply with applicable federal civil rights laws and [place] laws. We do not discriminate on the basis of race, color, national origin, age, disability, sex, sexual orientation, or gender identity.            Thank you!     Thank you  for choosing [facility] UROLOGY AND [facility] FOR PROSTATE AND UROLOGIC CANCERS  for your care. Our goal is always to provide you with excellent care. Hearing back from our patients is one way we can continue to improve our services. Please take a few minutes to complete the written survey that you may receive in the mail after your visit with us. Thank you!             Your Updated Medication List - Protect others around you: Learn how to safely use, store and throw away your medicines at www.disposemymeds.org.          This list is accurate as of 3/23/18  5:43 PM.  Always use your most recent med list.                   Brand Name Dispense Instructions for use Diagnosis    methylPREDNISolone 4 MG tablet    MEDROL DOSEPAK    21 tablet    Follow package instructions    Low back pain, unspecified back pain laterality, unspecified chronicity, with sciatica presence unspecified, Pain, radicular, lumbar       TYLENOL PO      Take 1,000 mg by mouth every 8 hours as needed for mild pain or fever

## 2018-03-23 NOTE — PROGRESS NOTES
"Maria De Jesus Hess  is a 31 year old female presents today for follow-up nutrition consultation. She ate more regularly, breakfasts and lunches, because she needed take phosphorus medication with food X2 daily, but now that has stopped so she stopped eating breakfast. She mentioned she \"doesn't care\" about a lot of things in her life right now. Her body language indicated she was uncomfortable. She will start back to work next Monday, and said I'll will be the same, go back to the same pattern.     Food preferences:  No red meat, no pork, no meat with skins and bones, no regular milk, no colon.  Ok with other meat, almond milk, Fairlife milk, miracle whilp  Challenge foods:  Grains, deserts, processed foods, rice, potatoes, salad dressing.  Meal plan total:  Grain=8, Protein=9, milk=3, veggies=3, fruit=3, fat=3, dessert=1  Energy needs:  8419-6948 kcals, 360-420g carbs, 96-120g protein, 50-65g fat.        Vitals:  Wt Readings from Last 4 Encounters:   11/08/17 119 lb 3.2 oz (54.1 kg)   10/11/17 121 lb 4.1 oz (55 kg)   09/28/17 117 lb 6.4 oz (53.3 kg)   09/22/17 121 lb 8 oz (55.1 kg)       Nutrition History  Patient is on a regular  diet at home.    Diet recall:  13:00: kombucha, trail mix,  1750: spinach, salsa, 2 T hummus, 2 wayne, 1/2 C cottage cheese, 1/2 avocado  2000: 1 C greek yogurt, 2 T PB    Physical Activity  Decreased running to 5-6 miles per day due to injury but also doesn't find it brings her onofre.     Time with Patient: 60 minutes    Nutrition  DX:.   1. Dietary counseling    2. Anorexia nervosa, restricting type           Nutrition Goals:   1: Continue [facility]  2: Communicate with therapist at [facility] weekly  3. Read book \"How to Eat\"    Long Term Goals: Gain a healthy relationship with food and properly fuel for physical activity.     Mary Song, MS, RD, CSSD, LD  [facility]    "

## 2018-03-28 ENCOUNTER — ALLIED HEALTH/NURSE VISIT (OUTPATIENT)
Dept: INTERNAL MEDICINE | Facility: CLINIC | Age: 33
End: 2018-03-28
Payer: COMMERCIAL

## 2018-03-28 DIAGNOSIS — Z71.3 DIETARY COUNSELING: Primary | ICD-10-CM

## 2018-03-28 DIAGNOSIS — F50.019 ANOREXIA NERVOSA, RESTRICTING TYPE: ICD-10-CM

## 2018-03-28 NOTE — MR AVS SNAPSHOT
After Visit Summary   3/28/2018    Maria De Jesus Hess    MRN: [id number]           Patient Information     Date Of Birth          1985        Visit Information        Provider Department      3/28/2018 1:00 PM Mary Song RD M [facility]        Today's Diagnoses     Dietary counseling    -  1    Anorexia nervosa, restricting type           Follow-ups after your visit        Your next 10 appointments already scheduled     Apr 12, 2018  1:00 PM CDT   (Arrive by 12:45 PM)   Return Nurtrition Mary with CRISTINA Morales [facility] ([facility])    [address] [id number] [phone]            Apr 18, 2018  1:30 PM CDT   (Arrive by 1:15 PM)   Return Rudytrition aMry with CRISTINA Morales [facility] ([facility])    [address] [id number] [phone]            Apr 25, 2018  1:30 PM CDT   (Arrive by 1:15 PM)   Return Rudytrition Mary with CRISTINA Morales [facility] ([facility])    [address] [place] [id number] [phone]            May 02, 2018  1:30 PM CDT   (Arrive by 1:15 PM)   Return Rudytrition Mary with CRISTINA Morales [facility] ([facility])    [address] [place] [id number] [phone]              Who to contact     Please call your clinic at [phone] to:    Ask questions about your health    Make or cancel appointments    Discuss your medicines    Learn about your test results    Speak to your doctor            Additional Information About Your Visit        MyChart Information     [employer] gives you secure access to your electronic health record. If you see a primary care provider, you can also send messages to your care  team and make appointments. If you have questions, please call your primary care clinic.  If you do not have a primary care provider, please call [phone] and they will assist you.      [employer] is an electronic gateway that provides easy, online access to your medical records. With [employer], you can request a clinic appointment, read your test results, renew a prescription or communicate with your care team.     To access your existing account, please contact your [facility] or call [phone] for assistance.        Care EveryWhere ID     This is your Care EveryWhere ID. This could be used by other organizations to access your [place] medical records  [id number]         Blood Pressure from Last 3 Encounters:   09/22/17 (!) 136/91   08/23/17 116/80   02/02/17 138/87    Weight from Last 3 Encounters:   11/08/17 119 lb 3.2 oz (54.1 kg)   10/11/17 121 lb 4.1 oz (55 kg)   09/28/17 117 lb 6.4 oz (53.3 kg)              We Performed the Following     MNT INDIVIDUAL F/U REASSESS, EA 15 MIN        Primary Care Provider Fax #    Physician No Ref-Primary [phone]       No address on file        Equal Access to Services     CESAR SHI : Hadii lalo gamingo Sojamie, waaxda luqadaha, qaybta kaalmada adeegyada, delgado dinh. So [facility] [phone].    ATENCIÓN: Si habla español, tiene a briscoe disposición servicios gratuitos de asistencia lingüística. Llame al [phone].    We comply with applicable federal civil rights laws and [place] laws. We do not discriminate on the basis of race, color, national origin, age, disability, sex, sexual orientation, or gender identity.            Thank you!     Thank you for choosing [facility]  for your care. Our goal is always to provide you with excellent care. Hearing back from our patients is one way we can continue to improve our services. Please take a few minutes to complete the written  survey that you may receive in the mail after your visit with us. Thank you!             Your Updated Medication List - Protect others around you: Learn how to safely use, store and throw away your medicines at www.disposemymeds.org.          This list is accurate as of 3/28/18  3:46 PM.  Always use your most recent med list.                   Brand Name Dispense Instructions for use Diagnosis    methylPREDNISolone 4 MG tablet    MEDROL DOSEPAK    21 tablet    Follow package instructions    Low back pain, unspecified back pain laterality, unspecified chronicity, with sciatica presence unspecified, Pain, radicular, lumbar       TYLENOL PO      Take 1,000 mg by mouth every 8 hours as needed for mild pain or fever

## 2018-03-28 NOTE — PROGRESS NOTES
"Maria De Jesus Hess  is a 31 year old female presents today for follow-up nutrition consultation. Maria De Jesus is back at work now, and is doing well; however, she is reverting back to previous eating and exercise patterns of running with little fueling. She is still attending [employer], but is up for review next week. Tonight her parents will be going to Friends and Family night at [facility] as well as being part of Maria De Jesus's therapy session tomorrow. She doesn't seem to want them involved, but she is letting them be apart of treatment for their sake and because they want it. There continues to be a disconnect between how she feels toward recovery and running versus what she is doing. I keep challenging her to think about other reasons why she runs now that she isn't training for a race.        Food preferences:  No red meat, no pork, no meat with skins and bones, no regular milk, no colon.  Ok with other meat, almond milk, Fairlife milk, miracle whilp  Challenge foods:  Grains, deserts, processed foods, rice, potatoes, salad dressing.  Meal plan total:  Grain=8, Protein=9, milk=3, veggies=3, fruit=3, fat=3, dessert=1  Energy needs:  4766-9312 kcals, 360-420g carbs, 96-120g protein, 50-65g fat.        Vitals:  Wt Readings from Last 4 Encounters:   11/08/17 119 lb 3.2 oz (54.1 kg)   10/11/17 121 lb 4.1 oz (55 kg)   09/28/17 117 lb 6.4 oz (53.3 kg)   09/22/17 121 lb 8 oz (55.1 kg)       Nutrition History  Patient is on a regular  diet at home.    Diet recall from previous appointment (no recall this week):  13:00: kombucha, trail mix,  1750: spinach, salsa, 2 T hummus, 2 wayne, 1/2 C cottage cheese, 1/2 avocado  2000: 1 C greek yogurt, 2 T PB    Physical Activity  Increased running to 10 miles per day.     Time with Patient: 60 minutes    Nutrition  DX:.   1. Dietary counseling    2. Anorexia nervosa, restricting type           Nutrition Goals:   1: Continue recommendations from [facility]  2. Read book \"How to Eat\"    Long " Term Goals: Gain a healthy relationship with food and properly fuel for physical activity.     Mary Song, MS, RD, CSSD, LD  [facility]

## 2018-04-04 ENCOUNTER — TELEPHONE (OUTPATIENT)
Dept: ORTHOPEDICS | Facility: CLINIC | Age: 33
End: 2018-04-04

## 2018-04-04 NOTE — TELEPHONE ENCOUNTER
[facility]    Phone Message    May a detailed message be left on voicemail: yes    Reason for Call: Requesting Results   Name/type of test: MRI  Date of test: 03/22/2018  Was test done at a location other than [facility] (Please fill in the location if not [facility])?: Yes: [facility]      Action Taken: Message routed to:  [facility] ([facility] Sports Medicine

## 2018-04-04 NOTE — TELEPHONE ENCOUNTER
Left voicemail for patient, informing her that Dr. Betts would like to see her in follow up to review MRI results. Scheduling number was left.

## 2018-04-12 ENCOUNTER — OFFICE VISIT (OUTPATIENT)
Dept: INTERNAL MEDICINE | Facility: CLINIC | Age: 33
End: 2018-04-12
Payer: COMMERCIAL

## 2018-04-12 DIAGNOSIS — Z71.3 DIETARY COUNSELING: Primary | ICD-10-CM

## 2018-04-12 DIAGNOSIS — F50.9 EATING DISORDER: ICD-10-CM

## 2018-04-12 NOTE — MR AVS SNAPSHOT
After Visit Summary   4/12/2018    Maria De Jesus Hess    MRN: [id number]           Patient Information     Date Of Birth          1985        Visit Information        Provider Department      4/12/2018 1:00 PM Mary Song RD M [facility]        Today's Diagnoses     Dietary counseling    -  1    Eating disorder           Follow-ups after your visit        Your next 10 appointments already scheduled     Apr 18, 2018  1:30 PM CDT   (Arrive by 1:15 PM)   Return Nurtrition Mary with CRISTINA Morales [facility] ([facility])    [address] [place] [id number] [phone]            Apr 25, 2018  1:30 PM CDT   (Arrive by 1:15 PM)   Return Rudytrition Mary with CRISTINA Morales [facility] ([facility])    [address] [place] [id number] [phone]            May 02, 2018  1:30 PM CDT   (Arrive by 1:15 PM)   Return Nurtrition Mary with CRISTINA Morales [facility] ([facility])    [address] [place] [id number] [phone]            May 04, 2018 10:40 AM CDT   (Arrive by 10:25 AM)   Return Visit with Ludy Betts MD   [facility] ([facility])    [address] [place] [id number] [phone]              Who to contact     Please call your clinic at [phone] to:    Ask questions about your health    Make or cancel appointments    Discuss your medicines    Learn about your test results    Speak to your doctor            Additional Information About Your Visit        MyChart Information     [employer] gives you secure access to your electronic health record. If you see a primary care provider, you can also send messages to your care team and make appointments.  If you have questions, please call your primary care clinic.  If you do not have a primary care provider, please call [phone] and they will assist you.      [employer] is an electronic gateway that provides easy, online access to your medical records. With [employer], you can request a clinic appointment, read your test results, renew a prescription or communicate with your care team.     To access your existing account, please contact your [facility] or call [phone] for assistance.        Care EveryWhere ID     This is your Care EveryWhere ID. This could be used by other organizations to access your [place] medical records  [id number]         Blood Pressure from Last 3 Encounters:   09/22/17 (!) 136/91   08/23/17 116/80   02/02/17 138/87    Weight from Last 3 Encounters:   11/08/17 119 lb 3.2 oz (54.1 kg)   10/11/17 121 lb 4.1 oz (55 kg)   09/28/17 117 lb 6.4 oz (53.3 kg)              We Performed the Following     MNT INDIVIDUAL F/U REASSESS, EA 15 MIN        Primary Care Provider Fax #    Physician No Ref-Primary [phone]       No address on file        Equal Access to Services     CESAR SHI : Hadii lalo gamingo Sojamie, waaxda luqadaha, qaybta kaalmada adetaeyada, delgado dinh. So [place] [phone].    ATENCIÓN: Si habla español, tiene a briscoe disposición servicios gratuitos de asistencia lingüística. Llame al [phone].    We comply with applicable federal civil rights laws and [place] laws. We do not discriminate on the basis of race, color, national origin, age, disability, sex, sexual orientation, or gender identity.            Thank you!     Thank you for choosing [facility]  for your care. Our goal is always to provide you with excellent care. Hearing back from our patients is one way we can continue to improve our services. Please take a few minutes to complete the written survey that you may receive  in the mail after your visit with us. Thank you!             Your Updated Medication List - Protect others around you: Learn how to safely use, store and throw away your medicines at www.disposemymeds.org.          This list is accurate as of 4/12/18  4:24 PM.  Always use your most recent med list.                   Brand Name Dispense Instructions for use Diagnosis    methylPREDNISolone 4 MG tablet    MEDROL DOSEPAK    21 tablet    Follow package instructions    Low back pain, unspecified back pain laterality, unspecified chronicity, with sciatica presence unspecified, Pain, radicular, lumbar       TYLENOL PO      Take 1,000 mg by mouth every 8 hours as needed for mild pain or fever

## 2018-04-12 NOTE — LETTER
4/12/2018       RE: Maria De Jesus Hess  [address]  [facility] [address]     Dear Colleague,    Thank you for referring your patient, Maria De Jesus Hess, to the [facility] at [facility]. Please see a copy of my visit note below.    Maria De Jesus Hess  is a 31 year old female presents today for follow-up nutrition consultation. Maria De Jesus is back at work now, and is doing well; however, she is reverting back to previous eating and exercise patterns of running with little fueling. She is still attending [employer], but is up for review next week.     Food preferences:  No red meat, no pork, no meat with skins and bones, no regular milk, no colon.  Ok with other meat, almond milk, Fairlife milk, miracle whilp  Challenge foods:  Grains, deserts, processed foods, rice, potatoes, salad dressing.  Meal plan total:  Grain=8, Protein=9, milk=3, veggies=3, fruit=3, fat=3, dessert=1  Energy needs:  0268-2251 kcals, 360-420g carbs, 96-120g protein, 50-65g fat.        Vitals:  Wt Readings from Last 4 Encounters:   11/08/17 119 lb 3.2 oz (54.1 kg)   10/11/17 121 lb 4.1 oz (55 kg)   09/28/17 117 lb 6.4 oz (53.3 kg)   09/22/17 121 lb 8 oz (55.1 kg)       Nutrition History  Patient is on a regular  diet at home.    Diet recall:    11:00:  Banana  14:30:  Starbucks skinny caramel late  18:30:  Gyro with Baklava at [facility]  20:30:  1/2c Greek yogurt, Rx bar    Physical Activity  Increased running to 10 miles per day.     Time with Patient: 60 minutes    Nutrition  DX:.   1. Dietary counseling    2. Eating disorder           Nutrition Goals:   1: Continue recommendations from the [facility]  2. Discussed continuity of care, encouraged to continue to work with RD since it is our last session at [facility].     Long Term Goals: Gain a healthy relationship with food and properly fuel for physical activity.     Mary Song, MS, RD, CSSD, LD  [facility]      Again, thank you for  allowing me to participate in the care of your patient.      Sincerely,    Mary Song RD

## 2018-04-12 NOTE — LETTER
4/12/2018    RE: Maria De Jesus Hess  [address]  [facility] [place] [facility] [address]     Maria De Jesus Hess  is a 31 year old female presents today for follow-up nutrition consultation. Maria De Jesus is back at work now, and is doing well; however, she is reverting back to previous eating and exercise patterns of running with little fueling. She is still attending [employer], but is up for review next week.     Food preferences:  No red meat, no pork, no meat with skins and bones, no regular milk, no colon.  Ok with other meat, almond milk, Fairlife milk, miracle whilp  Challenge foods:  Grains, deserts, processed foods, rice, potatoes, salad dressing.  Meal plan total:  Grain=8, Protein=9, milk=3, veggies=3, fruit=3, fat=3, dessert=1  Energy needs:  8598-3616 kcals, 360-420g carbs, 96-120g protein, 50-65g fat.        Vitals:  Wt Readings from Last 4 Encounters:   11/08/17 119 lb 3.2 oz (54.1 kg)   10/11/17 121 lb 4.1 oz (55 kg)   09/28/17 117 lb 6.4 oz (53.3 kg)   09/22/17 121 lb 8 oz (55.1 kg)       Nutrition History  Patient is on a regular  diet at home.    Diet recall:    11:00:  Banana  14:30:  Starbucks skinny caramel late  18:30:  Gyro with Baklava at [facility]  20:30:  1/2c Greek yogurt, Rx bar    Physical Activity  Increased running to 10 miles per day.     Time with Patient: 60 minutes    Nutrition  DX:.   1. Dietary counseling    2. Eating disorder       Nutrition Goals:   1: Continue recommendations from the [facility]  2. Discussed continuity of care, encouraged to continue to work with RD since it is our last session at [facility].     Long Term Goals: Gain a healthy relationship with food and properly fuel for physical activity.     Mary Song, MS, RD, CSSD, LD  [facility]

## 2018-04-12 NOTE — PROGRESS NOTES
Maria De Jesus Hess  is a 31 year old female presents today for follow-up nutrition consultation. Maria De Jesus is back at work now, and is doing well; however, she is reverting back to previous eating and exercise patterns of running with little fueling. She is still attending [employer], but is up for review next week.     Food preferences:  No red meat, no pork, no meat with skins and bones, no regular milk, no colon.  Ok with other meat, almond milk, Fairlife milk, miracle whilp  Challenge foods:  Grains, deserts, processed foods, rice, potatoes, salad dressing.  Meal plan total:  Grain=8, Protein=9, milk=3, veggies=3, fruit=3, fat=3, dessert=1  Energy needs:  8435-9516 kcals, 360-420g carbs, 96-120g protein, 50-65g fat.        Vitals:  Wt Readings from Last 4 Encounters:   11/08/17 119 lb 3.2 oz (54.1 kg)   10/11/17 121 lb 4.1 oz (55 kg)   09/28/17 117 lb 6.4 oz (53.3 kg)   09/22/17 121 lb 8 oz (55.1 kg)       Nutrition History  Patient is on a regular  diet at home.    Diet recall:    11:00:  Banana  14:30:  [employer] skinny caramel late  18:30:  Gyro with Baklava at [facility]  20:30:  1/2c Greek yogurt, Rx bar    Physical Activity  Increased running to 10 miles per day.     Time with Patient: 60 minutes    Nutrition  DX:.   1. Dietary counseling    2. Eating disorder           Nutrition Goals:   1: Continue recommendations from the [facility]  2. Discussed continuity of care, encouraged to continue to work with RD since it is our last session at [facility].     Long Term Goals: Gain a healthy relationship with food and properly fuel for physical activity.     Mary Song, MS, RD, CSSD, LD  [facility]

## 2018-05-04 ENCOUNTER — OFFICE VISIT (OUTPATIENT)
Dept: ORTHOPEDICS | Facility: CLINIC | Age: 33
End: 2018-05-04
Payer: COMMERCIAL

## 2018-05-04 VITALS — SYSTOLIC BLOOD PRESSURE: 129 MMHG | RESPIRATION RATE: 16 BRPM | HEART RATE: 56 BPM | DIASTOLIC BLOOD PRESSURE: 87 MMHG

## 2018-05-04 DIAGNOSIS — F41.1 GAD (GENERALIZED ANXIETY DISORDER): ICD-10-CM

## 2018-05-04 DIAGNOSIS — M76.899 HAMSTRING TENDONITIS: ICD-10-CM

## 2018-05-04 DIAGNOSIS — F50.00 ANOREXIA NERVOSA (H): Primary | ICD-10-CM

## 2018-05-04 DIAGNOSIS — F50.00 ANOREXIA NERVOSA (H): ICD-10-CM

## 2018-05-04 LAB
ALBUMIN SERPL-MCNC: 4.7 G/DL (ref 3.4–5)
ALP SERPL-CCNC: 58 U/L (ref 40–150)
ALT SERPL W P-5'-P-CCNC: 40 U/L (ref 0–50)
ANION GAP SERPL CALCULATED.3IONS-SCNC: 10 MMOL/L (ref 3–14)
AST SERPL W P-5'-P-CCNC: 41 U/L (ref 0–45)
BASOPHILS # BLD AUTO: 0 10E9/L (ref 0–0.2)
BASOPHILS NFR BLD AUTO: 0.8 %
BILIRUB DIRECT SERPL-MCNC: 0.2 MG/DL (ref 0–0.2)
BILIRUB SERPL-MCNC: 1 MG/DL (ref 0.2–1.3)
BUN SERPL-MCNC: 15 MG/DL (ref 7–30)
CALCIUM SERPL-MCNC: 9.2 MG/DL (ref 8.5–10.1)
CHLORIDE SERPL-SCNC: 104 MMOL/L (ref 94–109)
CO2 SERPL-SCNC: 24 MMOL/L (ref 20–32)
CREAT SERPL-MCNC: 0.77 MG/DL (ref 0.52–1.04)
DEPRECATED CALCIDIOL+CALCIFEROL SERPL-MC: 93 UG/L (ref 20–75)
DIFFERENTIAL METHOD BLD: ABNORMAL
EOSINOPHIL # BLD AUTO: 0 10E9/L (ref 0–0.7)
EOSINOPHIL NFR BLD AUTO: 0.4 %
ERYTHROCYTE [DISTWIDTH] IN BLOOD BY AUTOMATED COUNT: 11.9 % (ref 10–15)
ESTRADIOL SERPL-MCNC: 250 PG/ML
FERRITIN SERPL-MCNC: 48 NG/ML (ref 12–150)
FSH SERPL-ACNC: 2.1 IU/L
GFR SERPL CREATININE-BSD FRML MDRD: 86 ML/MIN/1.7M2
GLUCOSE SERPL-MCNC: 77 MG/DL (ref 70–99)
HCT VFR BLD AUTO: 46.9 % (ref 35–47)
HGB BLD-MCNC: 15.9 G/DL (ref 11.7–15.7)
IMM GRANULOCYTES # BLD: 0 10E9/L (ref 0–0.4)
IMM GRANULOCYTES NFR BLD: 0.2 %
LH SERPL-ACNC: 1.7 IU/L
LYMPHOCYTES # BLD AUTO: 1.3 10E9/L (ref 0.8–5.3)
LYMPHOCYTES NFR BLD AUTO: 25 %
MCH RBC QN AUTO: 32.9 PG (ref 26.5–33)
MCHC RBC AUTO-ENTMCNC: 33.9 G/DL (ref 31.5–36.5)
MCV RBC AUTO: 97 FL (ref 78–100)
MONOCYTES # BLD AUTO: 0.3 10E9/L (ref 0–1.3)
MONOCYTES NFR BLD AUTO: 5.6 %
NEUTROPHILS # BLD AUTO: 3.6 10E9/L (ref 1.6–8.3)
NEUTROPHILS NFR BLD AUTO: 68 %
NRBC # BLD AUTO: 0 10*3/UL
NRBC BLD AUTO-RTO: 0 /100
PLATELET # BLD AUTO: 188 10E9/L (ref 150–450)
POTASSIUM SERPL-SCNC: 3.9 MMOL/L (ref 3.4–5.3)
PROT SERPL-MCNC: 8.3 G/DL (ref 6.8–8.8)
RBC # BLD AUTO: 4.84 10E12/L (ref 3.8–5.2)
SODIUM SERPL-SCNC: 137 MMOL/L (ref 133–144)
T3FREE SERPL-MCNC: 2.1 PG/ML (ref 2.3–4.2)
WBC # BLD AUTO: 5.3 10E9/L (ref 4–11)

## 2018-05-04 NOTE — LETTER
5/4/2018      RE: Maria De Jesus Hess  [address]  [facility] [address]        Subjective:   Maria De Jesus Hess is a 32 year old female runner with AN who is here to review her eating disorder treatment progress and she discuss the results of her pelvis MRI which we discussed via MyChart. Her pain is improving with rest and PT.  It is a lot better.  She was relieved that it wasn't a sacral bone stress injury..  RIGHT hamstring flared up. Shas lost 7 lbs over the last few weeks. Last week ran 30 miles and this week plans on 20 miles to help her hamstring.      -She was participating in intensive outpatient at [facility] but was discharged due to losing more weight and not meeting the goals that had been determined for her at the [facility].   They are willing to restart her in the Program and are waiting for insurance approval. Stressed ou that Mary Caceres, Sports Dietician won't be able to see her at the [facility] any longer since Mary is leaving [facility]. Hopefully will be able to continue treatment with Mary thru Mary's private practice. Psychiatrist decided that she likely has underlying anxiety disorder and recently started her on hydroxyzine.  She thinks it might be helping.      Skipped a couple of menses in the last few months.  LMP:  One month ago.  Tends to be 4-6 weeks apart.     -ALLERGIES: She is allergic to sulfa drugs.    Meds:  Hydroxyzine 10mg tid-qid for anxiety.  New med.         Exam:   NAD  /87 (BP Location: Right arm, Patient Position: Sitting, Cuff Size: Adult Regular)  Pulse 56  Resp 16.  The patient decline a weight measurement today.       Lumbar;  Nttp, from with mil discomfort on the left with full extension, neg SLR testing, neg lumbar paraspinal or QL ttp   Pelvis:  Nttp over the R sacrum or the SI joint or gluteus.   Hips; from and nttp  SI joint testing;  Pain with sacral thrust, neg posterior thigh thrust, neg FABERs and FADIRs. Pain with R illaic compression testing.  Neg  distraction testing.     Mild ttp over the hamstring origin on the RIGHT, hamstring strength 5-/5 with moderate discomfort but weaker with isolating the medial hamstrings 4/5 on the RIGHT.     EXAM: MR PELVIS W/O CONTRAST  3/22/2018 1:24 PM       HISTORY: Female runner with sacral pain;  assess for sacral bone  stress injury; Sacral pain     COMPARISON: None     TECHNIQUE: Multiplanar multisequence MR imaging of the sacrum without  IV contrast.     FINDINGS:      No acute osseous abnormality.     No joint effusion, erosion, or subchondral sclerosis. Joint spaces are  maintained. Probable small rounded hemangioma within the sacrum at S3  on series 5 image 14.     No evidence of stress reaction in the sacrum. Disc spaces in the  visualized lumbar spine are preserved. Lumbarization of S1, in keeping  with prior numbering. Mild edema within the left-sided S1 superior  articular and opposing L5 articular process. Small adjacent synovial  cyst. Partially visualized edema within the right gluteus meka  (series 9 image 31).     Small amount of free fluid in the visualized pelvis, likely  physiologic.          IMPRESSION:   1. No evidence of stress fracture involving the sacrum, as questioned.  2. Left L5-S1 active arthropathy with small synovial cyst.  3. Partially visualized edema within the right gluteus meka  compatible with strain.            I have personally reviewed the examination and initial interpretation  and I agree with the findings.     JUTTA ELLERMANN, MD     Assessment/Plan:   ED: Anorexia Nervosa and Over exercising with recent worsening of symptoms.  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  PEARL      PLAN:  Repeat lab testing today.    I have reviewed her MRI with her.  She has improved markedly.  We also discussed her chronic hamstring tendinopathy. We discussed the importance of adequate nutrition in the recovery of injuries and limiting or d/cing  "her running. .    -Regarding her AN, she should continue to seek out treatment along with following their \"no to very limited exercise plan to help her improve.   - PEARL:  Treatment per Psychiatry.  Hopefully this will help her increase her intake and not feel compelled to exercise off any increased caloric intake.   -She will MyChart message me about the results and we will decide on a follow-up plan.    > 25 min of total time spent in one-on-one evalution and discussion with patient regarding nature of problem, course, prior treatments, and therapeutic options,> 50% of which was spent in counseling and coordination of care.      Ludy Betts MD, CAQ, FACSM, CCD  [facility]  Sports Medicine and Bone Health  Team Physician;  Athletics        "

## 2018-05-04 NOTE — PROGRESS NOTES
Subjective:   Maria De Jesus Hess is a 32 year old female runner with AN who is here to review her eating disorder treatment progress and she discuss the results of her pelvis MRI which we discussed via MyChart. Her pain is improving with rest and PT.  It is a lot better.  She was relieved that it wasn't a sacral bone stress injury..  RIGHT hamstring flared up. Shas lost 7 lbs over the last few weeks. Last week ran 30 miles and this week plans on 20 miles to help her hamstring.      -She was participating in intensive outpatient at [facility] but was discharged due to losing more weight and not meeting the goals that had been determined for her at the [facility].   They are willing to restart her in the Program and are waiting for insurance approval. Stressed ou that Mary Caceres, Sports Dietician won't be able to see her at the [facility] any longer since Mary is leaving [facility]. Hopefully will be able to continue treatment with Mary thru Mary's private practice. Psychiatrist decided that she likely has underlying anxiety disorder and recently started her on hydroxyzine.  She thinks it might be helping.      Skipped a couple of menses in the last few months.  LMP:  One month ago.  Tends to be 4-6 weeks apart.     -ALLERGIES: She is allergic to sulfa drugs.    Meds:  Hydroxyzine 10mg tid-qid for anxiety.  New med.         Exam:   NAD  /87 (BP Location: Right arm, Patient Position: Sitting, Cuff Size: Adult Regular)  Pulse 56  Resp 16.  The patient decline a weight measurement today.       Lumbar;  Nttp, from with mil discomfort on the left with full extension, neg SLR testing, neg lumbar paraspinal or QL ttp   Pelvis:  Nttp over the R sacrum or the SI joint or gluteus.   Hips; from and nttp  SI joint testing;  Pain with sacral thrust, neg posterior thigh thrust, neg FABERs and FADIRs. Pain with R illaic compression testing.  Neg distraction testing.     Mild ttp over the hamstring origin on the RIGHT, hamstring  strength 5-/5 with moderate discomfort but weaker with isolating the medial hamstrings 4/5 on the RIGHT.     EXAM: MR PELVIS W/O CONTRAST  3/22/2018 1:24 PM       HISTORY: Female runner with sacral pain;  assess for sacral bone  stress injury; Sacral pain     COMPARISON: None     TECHNIQUE: Multiplanar multisequence MR imaging of the sacrum without  IV contrast.     FINDINGS:      No acute osseous abnormality.     No joint effusion, erosion, or subchondral sclerosis. Joint spaces are  maintained. Probable small rounded hemangioma within the sacrum at S3  on series 5 image 14.     No evidence of stress reaction in the sacrum. Disc spaces in the  visualized lumbar spine are preserved. Lumbarization of S1, in keeping  with prior numbering. Mild edema within the left-sided S1 superior  articular and opposing L5 articular process. Small adjacent synovial  cyst. Partially visualized edema within the right gluteus meka  (series 9 image 31).     Small amount of free fluid in the visualized pelvis, likely  physiologic.          IMPRESSION:   1. No evidence of stress fracture involving the sacrum, as questioned.  2. Left L5-S1 active arthropathy with small synovial cyst.  3. Partially visualized edema within the right gluteus meka  compatible with strain.            I have personally reviewed the examination and initial interpretation  and I agree with the findings.     JUTTA ELLERMANN, MD     Assessment/Plan:   ED: Anorexia Nervosa and Over exercising with recent worsening of symptoms.  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  PEARL      PLAN:  Repeat lab testing today.    I have reviewed her MRI with her.  She has improved markedly.  We also discussed her chronic hamstring tendinopathy. We discussed the importance of adequate nutrition in the recovery of injuries and limiting or d/cing her running. .    -Regarding her AN, she should continue to seek out treatment along  "with following their \"no to very limited exercise plan to help her improve.   - PEARL:  Treatment per Psychiatry.  Hopefully this will help her increase her intake and not feel compelled to exercise off any increased caloric intake.   -She will MyChart message me about the results and we will decide on a follow-up plan.    > 25 min of total time spent in one-on-one evalution and discussion with patient regarding nature of problem, course, prior treatments, and therapeutic options,> 50% of which was spent in counseling and coordination of care.      Ludy Betts MD, CAQ, FACSM, CCD  [facility]  Sports Medicine and Bone Health  Team Physician;  Athletics    "

## 2018-05-04 NOTE — MR AVS SNAPSHOT
After Visit Summary   5/4/2018    Maria De Jesus Hess    MRN: [id number]           Patient Information     Date Of Birth          1985        Visit Information        Provider Department      5/4/2018 10:40 AM Ludy Betts MD [facility]        Today's Diagnoses     Anorexia nervosa    -  1    PEARL (generalized anxiety disorder)        Hamstring tendonitis           Follow-ups after your visit        Who to contact     Please call your clinic at [phone] to:    Ask questions about your health    Make or cancel appointments    Discuss your medicines    Learn about your test results    Speak to your doctor            Additional Information About Your Visit        MyChart Information     [employer] gives you secure access to your electronic health record. If you see a primary care provider, you can also send messages to your care team and make appointments. If you have questions, please call your primary care clinic.  If you do not have a primary care provider, please call [phone] and they will assist you.      [employer] is an electronic gateway that provides easy, online access to your medical records. With [employer], you can request a clinic appointment, read your test results, renew a prescription or communicate with your care team.     To access your existing account, please contact your [facility] or call [phone] for assistance.        Care EveryWhere ID     This is your Care EveryWhere ID. This could be used by other organizations to access your [place] medical records  [id number]        Your Vitals Were     Pulse Respirations                56 16           Blood Pressure from Last 3 Encounters:   05/04/18 129/87   09/22/17 (!) 136/91   08/23/17 116/80    Weight from Last 3 Encounters:   11/08/17 119 lb 3.2 oz (54.1 kg)   10/11/17 121 lb 4.1 oz (55 kg)   09/28/17 117 lb 6.4 oz (53.3 kg)               Primary Care Provider Fax #     Physician No Ref-Primary [phone]       No address on file        Equal Access to Services     BOLIVARJACKELYN JOSE GUADALUPE : Hadii aad ku hadtresa Bonilla, wabusterda prasannacamilleha, deniseta joonlorraine dawoodferyenni, delgado davenport ojedouard weeks ho josh dinh. So [facility] [phone].    ATENCIÓN: Si habla español, tiene a briscoe disposición servicios gratuitos de asistencia lingüística. Llame al [phone].    We comply with applicable federal civil rights laws and [place] laws. We do not discriminate on the basis of race, color, national origin, age, disability, sex, sexual orientation, or gender identity.            Thank you!     Thank you for choosing [facility]  for your care. Our goal is always to provide you with excellent care. Hearing back from our patients is one way we can continue to improve our services. Please take a few minutes to complete the written survey that you may receive in the mail after your visit with us. Thank you!             Your Updated Medication List - Protect others around you: Learn how to safely use, store and throw away your medicines at www.disposemymeds.org.          This list is accurate as of 5/4/18 11:59 PM.  Always use your most recent med list.                   Brand Name Dispense Instructions for use Diagnosis    methylPREDNISolone 4 MG tablet    MEDROL DOSEPAK    21 tablet    Follow package instructions    Low back pain, unspecified back pain laterality, unspecified chronicity, with sciatica presence unspecified, Pain, radicular, lumbar       TYLENOL PO      Take 1,000 mg by mouth every 8 hours as needed for mild pain or fever

## 2018-06-12 DIAGNOSIS — F50.9 EATING DISORDER: Primary | ICD-10-CM

## 2018-06-13 DIAGNOSIS — F50.9 EATING DISORDER: ICD-10-CM

## 2018-06-13 DIAGNOSIS — F50.00 ANOREXIA NERVOSA (H): ICD-10-CM

## 2018-06-13 LAB
ALBUMIN SERPL-MCNC: 4.5 G/DL (ref 3.4–5)
ALP SERPL-CCNC: 58 U/L (ref 40–150)
ALT SERPL W P-5'-P-CCNC: 54 U/L (ref 0–50)
ANION GAP SERPL CALCULATED.3IONS-SCNC: 8 MMOL/L (ref 3–14)
AST SERPL W P-5'-P-CCNC: 44 U/L (ref 0–45)
BILIRUB DIRECT SERPL-MCNC: 0.2 MG/DL (ref 0–0.2)
BILIRUB SERPL-MCNC: 0.9 MG/DL (ref 0.2–1.3)
BUN SERPL-MCNC: 12 MG/DL (ref 7–30)
CALCIUM SERPL-MCNC: 9.2 MG/DL (ref 8.5–10.1)
CHLORIDE SERPL-SCNC: 104 MMOL/L (ref 94–109)
CO2 SERPL-SCNC: 25 MMOL/L (ref 20–32)
CREAT SERPL-MCNC: 0.8 MG/DL (ref 0.52–1.04)
ERYTHROCYTE [DISTWIDTH] IN BLOOD BY AUTOMATED COUNT: 12.2 % (ref 10–15)
GFR SERPL CREATININE-BSD FRML MDRD: 82 ML/MIN/1.7M2
GLUCOSE SERPL-MCNC: 76 MG/DL (ref 70–99)
HCT VFR BLD AUTO: 42.9 % (ref 35–47)
HGB BLD-MCNC: 14.9 G/DL (ref 11.7–15.7)
MCH RBC QN AUTO: 33.4 PG (ref 26.5–33)
MCHC RBC AUTO-ENTMCNC: 34.7 G/DL (ref 31.5–36.5)
MCV RBC AUTO: 96 FL (ref 78–100)
PLATELET # BLD AUTO: 207 10E9/L (ref 150–450)
POTASSIUM SERPL-SCNC: 3.9 MMOL/L (ref 3.4–5.3)
PROT SERPL-MCNC: 7.6 G/DL (ref 6.8–8.8)
RBC # BLD AUTO: 4.46 10E12/L (ref 3.8–5.2)
SODIUM SERPL-SCNC: 137 MMOL/L (ref 133–144)
WBC # BLD AUTO: 6.1 10E9/L (ref 4–11)

## 2018-06-14 LAB — INTERPRETATION ECG - MUSE: NORMAL

## 2018-06-19 LAB — INTERPRETATION ECG - MUSE: NORMAL

## 2018-08-13 ENCOUNTER — APPOINTMENT (OUTPATIENT)
Dept: GENERAL RADIOLOGY | Facility: CLINIC | Age: 33
End: 2018-08-13
Attending: FAMILY MEDICINE
Payer: COMMERCIAL

## 2018-08-13 DIAGNOSIS — F50.9 EATING DISORDER: ICD-10-CM

## 2018-08-13 DIAGNOSIS — F50.9 EATING DISORDER: Primary | ICD-10-CM

## 2018-08-13 LAB
ALBUMIN SERPL-MCNC: 4.1 G/DL (ref 3.4–5)
ALP SERPL-CCNC: 59 U/L (ref 40–150)
ALT SERPL W P-5'-P-CCNC: 46 U/L (ref 0–50)
ANION GAP SERPL CALCULATED.3IONS-SCNC: 9 MMOL/L (ref 3–14)
AST SERPL W P-5'-P-CCNC: 48 U/L (ref 0–45)
BILIRUB DIRECT SERPL-MCNC: 0.2 MG/DL (ref 0–0.2)
BILIRUB SERPL-MCNC: 0.9 MG/DL (ref 0.2–1.3)
BUN SERPL-MCNC: 12 MG/DL (ref 7–30)
CALCIUM SERPL-MCNC: 9.2 MG/DL (ref 8.5–10.1)
CHLORIDE SERPL-SCNC: 103 MMOL/L (ref 94–109)
CO2 SERPL-SCNC: 24 MMOL/L (ref 20–32)
CREAT SERPL-MCNC: 0.84 MG/DL (ref 0.52–1.04)
ERYTHROCYTE [DISTWIDTH] IN BLOOD BY AUTOMATED COUNT: 12.5 % (ref 10–15)
FERRITIN SERPL-MCNC: 4 NG/ML (ref 12–150)
GFR SERPL CREATININE-BSD FRML MDRD: 78 ML/MIN/1.7M2
GLUCOSE SERPL-MCNC: 80 MG/DL (ref 70–99)
HCT VFR BLD AUTO: 41.6 % (ref 35–47)
HGB BLD-MCNC: 14.3 G/DL (ref 11.7–15.7)
MCH RBC QN AUTO: 33.3 PG (ref 26.5–33)
MCHC RBC AUTO-ENTMCNC: 34.4 G/DL (ref 31.5–36.5)
MCV RBC AUTO: 97 FL (ref 78–100)
PLATELET # BLD AUTO: 203 10E9/L (ref 150–450)
POTASSIUM SERPL-SCNC: 4.6 MMOL/L (ref 3.4–5.3)
PROT SERPL-MCNC: 7.3 G/DL (ref 6.8–8.8)
RBC # BLD AUTO: 4.29 10E12/L (ref 3.8–5.2)
SODIUM SERPL-SCNC: 136 MMOL/L (ref 133–144)
WBC # BLD AUTO: 6.6 10E9/L (ref 4–11)

## 2018-08-14 LAB — INTERPRETATION ECG - MUSE: NORMAL

## 2018-08-20 ENCOUNTER — TELEPHONE (OUTPATIENT)
Dept: ORTHOPEDICS | Facility: CLINIC | Age: 33
End: 2018-08-20

## 2018-08-20 NOTE — TELEPHONE ENCOUNTER
Called and spoke with patient with regards to follow up appointment she is wanting to clearance for a marathon and wanted to be seen, I told her that she could be on the [place] schedule and then see .

## 2018-08-23 ENCOUNTER — OFFICE VISIT (OUTPATIENT)
Dept: ORTHOPEDICS | Facility: CLINIC | Age: 33
End: 2018-08-23
Payer: COMMERCIAL

## 2018-08-23 VITALS — WEIGHT: 115 LBS | RESPIRATION RATE: 16 BRPM | HEIGHT: 64 IN | BODY MASS INDEX: 19.63 KG/M2

## 2018-08-23 DIAGNOSIS — E63.9 INADEQUATE ORAL NUTRITIONAL INTAKE: ICD-10-CM

## 2018-08-23 DIAGNOSIS — F41.1 GENERALIZED ANXIETY DISORDER: Primary | ICD-10-CM

## 2018-08-23 DIAGNOSIS — R79.0 LOW FERRITIN LEVEL: ICD-10-CM

## 2018-08-23 NOTE — LETTER
8/23/2018      RE: Maria De Jesus Hess  [address]  [facility] [address]       SUBJECTIVE    Maria De Jesus Hess is a 33 year old female who presents for RECHECK (Return to run )    1. Nutritional status  Has weekly labs for the program she is in for inadequate nutrition and eating disorder. Labs revealed mostly normal labs with a very low ferritin (4).     Has marathon scheduled for 2 weeks, she has been off a normal training and running schedule somewhat since being in the program. Regardless of her plan with them she needs clearance from Dr. Betts before performing in a high endurance race.     She does not eat any red meat. Has started the iron pills in the last week/two taking 2 a day one am and one pm with supplemental vitamin c.     Gets a period here and there, not heavy. Not regular.    No new injury, still struggling with her hamstring here and there.    Does like taking the hydroxyzine when she needs for flares of her anxiety.       Objective     PROBLEM LIST:  Patient Active Problem List   Diagnosis     Left hamstring injury, subsequent encounter     Inadequate oral nutritional intake       PAST MEDICAL HISTORY:No past medical history on file.  SURGICAL HISTORY:  No past surgical history on file.    SOCIAL HISTORY:  Social History     Social History     Marital status: Unknown     Spouse name: N/A     Number of children: N/A     Years of education: N/A     Occupational History     Not on file.     Social History Main Topics     Smoking status: Never Smoker     Smokeless tobacco: Never Used     Alcohol use Not on file     Drug use: Not on file     Sexual activity: Not on file     Other Topics Concern     Not on file     Social History Narrative     FAMILY HISTORY:No family history on file.  CURRENT MEDICATIONS:   Current Outpatient Prescriptions   Medication     hydrOXYzine (ATARAX) 25 MG tablet     methylPREDNISolone (MEDROL DOSEPAK) 4 MG tablet     Acetaminophen (TYLENOL PO)     No current  "facility-administered medications for this visit.        ALLERGIES:  Sulfa drugs    Resp 16  Ht 1.617 m (5' 3.68\")  Wt 52.2 kg (115 lb)  BMI 19.94 kg/m2  (last weight we have was 119 in 11/17)  /74 and pulse 50  EXAM:   Gen:  Pleasant female in no apparent distress, sitting/resting comfortably, thin  HEENT:  Normocephalic atraumatic  Lungs:  Normal rate and effort, ctab  CV: pulses normal, rrr, no murmur   Skin:  No rash  Psych: alert, eye contact is reduced when discussing eating, anxious affect    EKG reviewed and shows changes consistent with athletic heart and no worrisome findings             ICD-10-CM    1. Generalized anxiety disorder F41.1 hydrOXYzine (ATARAX) 25 MG tablet   2. Low ferritin level R79.0    3. Inadequate oral nutritional intake R63.8       Discussed with Maria De Jesus today with Dr. Betts as well this is a long term plan that we are trying to work with. She is intelligent and knowledgeable about this but still frustrated with parts of the process. She will continue to work with her eating team, encouraged las to evaluate her medically, continue to monitor her periodically she will follow up with Dr. Betts in 2-3 months.    She could be medically cleared to run this race but we discussed that this is a critical body task to use all that energy when she is low on her excess energy and calories at this time, discussed that ferritin is one marker we can detect this with.       Patient Instructions   1. Ferritin is the iron bank account. You need more iron!  When you eat iron in your diet, eat vitamin c containing fruits with it.     2. meat, leafy green vegetables, and beans, or taking iron supplements can affect your results    3. Keep checking with your team    4. Do another DXA in spring.        Plan of care discussed with Dr. Ludy Craig  Primary Care Sports Medicine Fellow  8/23/2018 1:41 PM      Attending Note:   I have personally examined this patient and have reviewed " the clinical presentation and progress note with the fellow. I agree with the treatment plan as outlined. The plan was formulated with the fellow on the day of the patient's visit. I have reviewed all labs and ekg with the fellow and agree with the findings in the documentation.     Ludy Betts MD, CAQ, CCD  [facility]  Sports Medicine and Bone Health    Ben Craig MD

## 2018-08-23 NOTE — PROGRESS NOTES
SUBJECTIVE    Maria De Jesus Hess is a 33 year old female who presents for RECHECK (Return to run )    1. Nutritional status  Has weekly labs for the program she is in for inadequate nutrition and eating disorder. Labs revealed mostly normal labs with a very low ferritin (4).     Has marathon scheduled for 2 weeks, she has been off a normal training and running schedule somewhat since being in the program. Regardless of her plan with them she needs clearance from Dr. Betts before performing in a high endurance race.     She does not eat any red meat. Has started the iron pills in the last week/two taking 2 a day one am and one pm with supplemental vitamin c.     Gets a period here and there, not heavy. Not regular.    No new injury, still struggling with her hamstring here and there.    Does like taking the hydroxyzine when she needs for flares of her anxiety.       Objective     PROBLEM LIST:  Patient Active Problem List   Diagnosis     Left hamstring injury, subsequent encounter     Inadequate oral nutritional intake       PAST MEDICAL HISTORY:No past medical history on file.  SURGICAL HISTORY:  No past surgical history on file.    SOCIAL HISTORY:  Social History     Social History     Marital status: Unknown     Spouse name: N/A     Number of children: N/A     Years of education: N/A     Occupational History     Not on file.     Social History Main Topics     Smoking status: Never Smoker     Smokeless tobacco: Never Used     Alcohol use Not on file     Drug use: Not on file     Sexual activity: Not on file     Other Topics Concern     Not on file     Social History Narrative     FAMILY HISTORY:No family history on file.  CURRENT MEDICATIONS:   Current Outpatient Prescriptions   Medication     hydrOXYzine (ATARAX) 25 MG tablet     methylPREDNISolone (MEDROL DOSEPAK) 4 MG tablet     Acetaminophen (TYLENOL PO)     No current facility-administered medications for this visit.        ALLERGIES:  Sulfa drugs    Resp 16  Ht  "1.617 m (5' 3.68\")  Wt 52.2 kg (115 lb)  BMI 19.94 kg/m2  (last weight we have was 119 in 11/17)  /74 and pulse 50  EXAM:   Gen:  Pleasant female in no apparent distress, sitting/resting comfortably, thin  HEENT:  Normocephalic atraumatic  Lungs:  Normal rate and effort, ctab  CV: pulses normal, rrr, no murmur   Skin:  No rash  Psych: alert, eye contact is reduced when discussing eating, anxious affect    EKG reviewed and shows changes consistent with athletic heart and no worrisome findings             ICD-10-CM    1. Generalized anxiety disorder F41.1 hydrOXYzine (ATARAX) 25 MG tablet   2. Low ferritin level R79.0    3. Inadequate oral nutritional intake R63.8       Discussed with Maria De Jesus today with Dr. Betts as well this is a long term plan that we are trying to work with. She is intelligent and knowledgeable about this but still frustrated with parts of the process. She will continue to work with her eating team, encouraged las to evaluate her medically, continue to monitor her periodically she will follow up with Dr. Betts in 2-3 months.    She could be medically cleared to run this race but we discussed that this is a critical body task to use all that energy when she is low on her excess energy and calories at this time, discussed that ferritin is one marker we can detect this with.       Patient Instructions   1. Ferritin is the iron bank account. You need more iron!  When you eat iron in your diet, eat vitamin c containing fruits with it.     2. meat, leafy green vegetables, and beans, or taking iron supplements can affect your results    3. Keep checking with your team    4. Do another DXA in spring.        Plan of care discussed with Dr. Ludy Craig  Primary Care Sports Medicine Fellow  8/23/2018 1:41 PM    "

## 2018-08-23 NOTE — PATIENT INSTRUCTIONS
1. Ferritin is the iron bank account. You need more iron!  When you eat iron in your diet, eat vitamin c containing fruits with it.     2. meat, leafy green vegetables, and beans, or taking iron supplements can affect your results    3. Keep checking with your team    4. Do another DXA in spring.

## 2018-08-23 NOTE — MR AVS SNAPSHOT
After Visit Summary   8/23/2018    Maria De Jesus Hess    MRN: [id number]           Patient Information     Date Of Birth          1985        Visit Information        Provider Department      8/23/2018 1:00 PM Ben Craig MD [facility] Sports Medicine        Today's Diagnoses     Generalized anxiety disorder    -  1    Low ferritin level        Inadequate oral nutritional intake          Care Instructions    1. Ferritin is the iron bank account. You need more iron!  When you eat iron in your diet, eat vitamin c containing fruits with it.     2. meat, leafy green vegetables, and beans, or taking iron supplements can affect your results    3. Keep checking with your team    4. Do another DXA in spring.           Follow-ups after your visit        Who to contact     Please call your clinic at [phone] to:    Ask questions about your health    Make or cancel appointments    Discuss your medicines    Learn about your test results    Speak to your doctor            Additional Information About Your Visit        MyChart Information     [employer] gives you secure access to your electronic health record. If you see a primary care provider, you can also send messages to your care team and make appointments. If you have questions, please call your primary care clinic.  If you do not have a primary care provider, please call [phone] and they will assist you.      [employer] is an electronic gateway that provides easy, online access to your medical records. With [employer], you can request a clinic appointment, read your test results, renew a prescription or communicate with your care team.     To access your existing account, please contact your [facility] or call [phone] for assistance.        Care EveryWhere ID     This is your Care EveryWhere ID. This could be used by other organizations to access your [place] medical records  [id number]        Your Vitals Were   "   Respirations Height BMI (Body Mass Index)             16 1.617 m (5' 3.68\") 19.94 kg/m2          Blood Pressure from Last 3 Encounters:   05/04/18 129/87   09/22/17 (!) 136/91   08/23/17 116/80    Weight from Last 3 Encounters:   08/23/18 52.2 kg (115 lb)   11/08/17 54.1 kg (119 lb 3.2 oz)   10/11/17 55 kg (121 lb 4.1 oz)              Today, you had the following     No orders found for display       Primary Care Provider Fax #    Physician No Ref-Primary [phone]       No address on file        Equal Access to Services     [facility] JOSE GUADALUPE : Desi Bonilla, lexi tsang, avinash jones, delgado moreno . So [facility] [phone].    ATENCIÓN: Si habla español, tiene a briscoe disposición servicios gratuitos de asistencia lingüística. Llame al [phone].    We comply with applicable federal civil rights laws and [place] laws. We do not discriminate on the basis of race, color, national origin, age, disability, sex, sexual orientation, or gender identity.            Thank you!     Thank you for choosing [facility]  for your care. Our goal is always to provide you with excellent care. Hearing back from our patients is one way we can continue to improve our services. Please take a few minutes to complete the written survey that you may receive in the mail after your visit with us. Thank you!             Your Updated Medication List - Protect others around you: Learn how to safely use, store and throw away your medicines at www.disposemymeds.org.          This list is accurate as of 8/23/18 11:59 PM.  Always use your most recent med list.                   Brand Name Dispense Instructions for use Diagnosis    hydrOXYzine 25 MG tablet    ATARAX    60 tablet    Take 1-2 tablets (25-50 mg) by mouth every 6 hours as needed for anxiety    Generalized anxiety disorder       methylPREDNISolone 4 MG tablet    MEDROL DOSEPAK    21 tablet    Follow package " instructions    Low back pain, unspecified back pain laterality, unspecified chronicity, with sciatica presence unspecified, Pain, radicular, lumbar       TYLENOL PO      Take 1,000 mg by mouth every 8 hours as needed for mild pain or fever

## 2018-08-23 NOTE — PROGRESS NOTES
Attending Note:   I have personally examined this patient and have reviewed the clinical presentation and progress note with the fellow. I agree with the treatment plan as outlined. The plan was formulated with the fellow on the day of the patient's visit. I have reviewed all labs and ekg with the fellow and agree with the findings in the documentation.     Ludy Betts MD, CAQ, CCD  [facility]  Sports Medicine and Bone Health

## 2018-08-31 PROBLEM — F50.9 DISORDERED EATING: Status: ACTIVE | Noted: 2018-08-31

## 2018-08-31 PROBLEM — E63.9 INADEQUATE ORAL NUTRITIONAL INTAKE: Status: ACTIVE | Noted: 2018-08-31

## 2018-08-31 RX ORDER — HYDROXYZINE HYDROCHLORIDE 25 MG/1
25-50 TABLET, FILM COATED ORAL EVERY 6 HOURS PRN
Qty: 60 TABLET | Refills: 0 | COMMUNITY
Start: 2018-08-23 | End: 2020-11-11

## 2018-09-27 ENCOUNTER — MYC MEDICAL ADVICE (OUTPATIENT)
Dept: ORTHOPEDICS | Facility: CLINIC | Age: 33
End: 2018-09-27

## 2018-09-27 DIAGNOSIS — F50.9 EATING DISORDER: Primary | ICD-10-CM

## 2018-09-27 DIAGNOSIS — F50.00 ANOREXIA NERVOSA (H): ICD-10-CM

## 2018-09-27 DIAGNOSIS — F50.9 EATING DISORDER: ICD-10-CM

## 2018-09-27 LAB
ALBUMIN SERPL-MCNC: 4.3 G/DL (ref 3.4–5)
ALP SERPL-CCNC: 58 U/L (ref 40–150)
ALT SERPL W P-5'-P-CCNC: 64 U/L (ref 0–50)
ANION GAP SERPL CALCULATED.3IONS-SCNC: 6 MMOL/L (ref 3–14)
AST SERPL W P-5'-P-CCNC: 49 U/L (ref 0–45)
BILIRUB DIRECT SERPL-MCNC: 0.2 MG/DL (ref 0–0.2)
BILIRUB SERPL-MCNC: 1 MG/DL (ref 0.2–1.3)
BUN SERPL-MCNC: 16 MG/DL (ref 7–30)
CALCIUM SERPL-MCNC: 10 MG/DL (ref 8.5–10.1)
CHLORIDE SERPL-SCNC: 102 MMOL/L (ref 94–109)
CO2 SERPL-SCNC: 27 MMOL/L (ref 20–32)
CREAT SERPL-MCNC: 0.84 MG/DL (ref 0.52–1.04)
ERYTHROCYTE [DISTWIDTH] IN BLOOD BY AUTOMATED COUNT: 12 % (ref 10–15)
FERRITIN SERPL-MCNC: 74 NG/ML (ref 12–150)
GFR SERPL CREATININE-BSD FRML MDRD: 77 ML/MIN/1.7M2
GLUCOSE SERPL-MCNC: 83 MG/DL (ref 70–99)
HCT VFR BLD AUTO: 45.4 % (ref 35–47)
HGB BLD-MCNC: 15.2 G/DL (ref 11.7–15.7)
MAGNESIUM SERPL-MCNC: 2.1 MG/DL (ref 1.6–2.3)
MCH RBC QN AUTO: 32.9 PG (ref 26.5–33)
MCHC RBC AUTO-ENTMCNC: 33.5 G/DL (ref 31.5–36.5)
MCV RBC AUTO: 98 FL (ref 78–100)
PHOSPHATE SERPL-MCNC: 4 MG/DL (ref 2.5–4.5)
PLATELET # BLD AUTO: 203 10E9/L (ref 150–450)
POTASSIUM SERPL-SCNC: 5 MMOL/L (ref 3.4–5.3)
PROT SERPL-MCNC: 7.8 G/DL (ref 6.8–8.8)
RBC # BLD AUTO: 4.62 10E12/L (ref 3.8–5.2)
SODIUM SERPL-SCNC: 135 MMOL/L (ref 133–144)
WBC # BLD AUTO: 7.2 10E9/L (ref 4–11)

## 2018-09-28 LAB — INTERPRETATION ECG - MUSE: NORMAL

## 2018-10-10 ENCOUNTER — OFFICE VISIT (OUTPATIENT)
Dept: FAMILY MEDICINE | Facility: CLINIC | Age: 33
End: 2018-10-10
Payer: COMMERCIAL

## 2018-10-10 VITALS
TEMPERATURE: 98 F | WEIGHT: 123.5 LBS | OXYGEN SATURATION: 97 % | DIASTOLIC BLOOD PRESSURE: 87 MMHG | HEIGHT: 64 IN | SYSTOLIC BLOOD PRESSURE: 133 MMHG | BODY MASS INDEX: 21.08 KG/M2 | HEART RATE: 57 BPM

## 2018-10-10 DIAGNOSIS — E63.9 INADEQUATE ORAL NUTRITIONAL INTAKE: ICD-10-CM

## 2018-10-10 DIAGNOSIS — R63.5 ABNORMAL WEIGHT GAIN: Primary | ICD-10-CM

## 2018-10-10 LAB
ALBUMIN SERPL-MCNC: 4.1 G/DL (ref 3.3–4.6)
ALP SERPL-CCNC: 53 U/L (ref 40–150)
ALT SERPL-CCNC: 49 U/L (ref 0–50)
AST SERPL-CCNC: 69 U/L (ref 0–45)
BILIRUB SERPL-MCNC: 1.1 MG/DL (ref 0.2–1.3)
BILIRUBIN UR: NEGATIVE
BLOOD UR: NEGATIVE
BUN SERPL-MCNC: 12 MG/DL (ref 5–24)
CALCIUM SERPL-MCNC: 9.7 MG/DL (ref 8.5–10.4)
CHLORIDE SERPLBLD-SCNC: 100 MMOL/L (ref 94–109)
CK SERPL-CCNC: 318 U/L (ref 30–225)
CO2 SERPL-SCNC: 30 MMOL/L (ref 20–32)
CREAT SERPL-MCNC: 0.8 MG/DL (ref 0.6–1.3)
EGFR CALCULATED (BLACK REFERENCE): 105.6
EGFR CALCULATED (NON BLACK REFERENCE): 87.3
GLUCOSE SERPL-MCNC: 86 MG/DL (ref 60–109)
GLUCOSE URINE: NEGATIVE
KETONES UR QL: NEGATIVE
LEUKOCYTE ESTERASE UR: NEGATIVE
NITRITE UR QL STRIP: NEGATIVE
PH UR STRIP: 6.5 [PH] (ref 5–7)
PHOSPHATE SERPL-MCNC: 3.2 MG/DL (ref 2.5–4.5)
POTASSIUM SERPL-SCNC: 4 MMOL/L (ref 3.4–5.3)
PROT SERPL-MCNC: 7.6 G/DL (ref 6.8–8.8)
PROTEIN UR: NEGATIVE
SODIUM SERPL-SCNC: 143 MMOL/L (ref 133–144)
SP GR UR STRIP: <=1.005
UROBILINOGEN UR STRIP-ACNC: NORMAL

## 2018-10-10 ASSESSMENT — PAIN SCALES - GENERAL: PAINLEVEL: NO PAIN (0)

## 2018-10-10 NOTE — PROGRESS NOTES
"REASON FOR VISIT:  Establish care, bilateral leg swelling      HISTORY OF PRESENT ILLNESS: Maria De Jesus Hess is a 33 year old female who presents for bilateral leg swelling. She usually sees Dr. Betts in sports medicine. She ran the [place] this weekend, 3 days ago. This was her 21st marathon, and her 10th consecutive year running the [place]. After the marathon, she has had significant swelling in both legs and she has gained 7 pounds since last week. She saw her therapist this morning, and she recommended that Maria De Jesus follow-up with someone about the edema. She also has a text from Mary Song RD with some recommended labs. She has not noticed any changes in her urine. No SOB or chest pain. She does reports some general abdominal pain. She rarely drinks EtOH. She does not take any ibuprofen.    Of note, she is in treatment at the [facility] for an eating disorder. She has monthly EKGs. She had labs 1 month ago on 9/27. She had slightly elevated ALT and AST, normal basic metabolic panel, CBC, ferritin, phosphorous, and magnesium. She is currently taking a phosphorous supplement.      SOCIAL HISTORY: She lives in [place]. She works in L[employer] at the [employer]. She went to [employer] for college, and ran in college.      MEDICATIONS:  Reviewed.       REVIEW OF SYSTEMS:  POSITIVE for lower extremity edema and weight gain as per HPI. Also, chronic tendinopathy in hamstrings for 5 years. She has been doing PT twice/week for that entire time. She has had multiple PRP injections in the past, and would like to discuss treatment options. She has tried a different PT for 3 years in the past.  Denies shortness of breath or chest pain.       OBJECTIVE:      EXAM:  VITAL SIGNS: /87 (BP Location: Right arm, Patient Position: Chair, Cuff Size: Adult Regular)  Pulse 57  Temp 98  F (36.7  C) (Oral)  Ht 5' 3.82\" (162.1 cm)  Wt 123 lb 8 oz (56 kg)  SpO2 97%  BMI 21.32 kg/m2  Constitutional: healthy, alert and no " distress   Cardiovascular: RRR. No murmurs.  Respiratory: Lungs clear to auscultation bilaterally.  Abdomen: Abdomen soft, non-tender. BS normal. No masses, organomegaly.  SKIN: no suspicious lesions or rashes  JOINT/EXTREMITIES: extremities normal- no gross deformities noted and normal muscle tone. No edema noted.     LABORATORY DATA: Urinalysis normal. CBC normal except slightly elevated sodium (still in normal range) and elevated AST. CK total and phosphorous pending.      ASSESSMENT AND PLAN:   34 yo who is 2 days s/p a marathon who has had 8 lbs of weight gain. Kidneys seem to be functioning well. All labs (except AST) are in the normal range. Still waiting on CPK.    P/  Discussed that there can be some fluid retention after stress (e.g. A marathon) and she may experience some diuresis over the next week.   Await CPK levels.   If weight continues to increase, return for reevaluation.     Call with any problems or questions.    --Roberto Coffey MD         I, Mary Jo Bhandari, am serving as a scribe to document services personally performed by Dr. Roberto Coffey, based on data collection and the provider's statements to me.

## 2018-10-10 NOTE — PATIENT INSTRUCTIONS
34 yo who is 2 days s/p a marathon who has had 8 lbs of weight gain. Kidneys seem to be functioning well. All labs (except AST) are in the normal range. Still waiting on CPK.    P/  Discussed that there can be some fluid retention after stress (e.g. A marathon) and she may experience some diuresis over the next week.   Await CPK levels.   If weight continues to increase, return for reevaluation.     --Roberto Coffey MD

## 2018-10-10 NOTE — NURSING NOTE
"33 year old  Chief Complaint   Patient presents with     Establish Care     Swelling     Pt reports that after running a [place] last [place] 10/7/18 that bilateral legs are now swollen.        Blood pressure 133/87, pulse 57, temperature 98  F (36.7  C), temperature source Oral, height 5' 3.82\" (162.1 cm), weight 123 lb 8 oz (56 kg), SpO2 97 %, not currently breastfeeding. Body mass index is 21.32 kg/(m^2).  Patient Active Problem List   Diagnosis     Left hamstring injury, subsequent encounter     Inadequate oral nutritional intake       Wt Readings from Last 2 Encounters:   10/10/18 123 lb 8 oz (56 kg)   08/23/18 115 lb (52.2 kg)     BP Readings from Last 3 Encounters:   10/10/18 133/87   05/04/18 129/87   09/22/17 (!) 136/91         Current Outpatient Prescriptions   Medication     hydrOXYzine (ATARAX) 25 MG tablet     Acetaminophen (TYLENOL PO)     methylPREDNISolone (MEDROL DOSEPAK) 4 MG tablet     No current facility-administered medications for this visit.        Social History   Substance Use Topics     Smoking status: Never Smoker     Smokeless tobacco: Never Used     Alcohol use No       Health Maintenance Due   Topic Date Due     PHQ-2 Q1 YR  05/09/1996     HIV SCREEN (SYSTEM ASSIGNED)  05/09/2002     PAP SCREENING Q3 YR (SYSTEM ASSIGNED)  05/09/2005     INFLUENZA VACCINE (1) 09/01/2018       No results found for: JUANITA Tejada MA  October 10, 2018 2:25 PM    "

## 2018-10-10 NOTE — MR AVS SNAPSHOT
After Visit Summary   10/10/2018    Maria De Jesus Hess    MRN: [id number]           Patient Information     Date Of Birth          1985        Visit Information        Provider Department      10/10/2018 2:20 PM Roberto Coffey MD [facility]        Today's Diagnoses     Abnormal weight gain    -  1    Inadequate oral nutritional intake          Care Instructions    34 yo who is 2 days s/p a marathon who has had 8 lbs of weight gain. Kidneys seem to be functioning well. All labs (except AST) are in the normal range. Still waiting on CPK.    P/  Discussed that there can be some fluid retention after stress (e.g. A marathon) and she may experience some diuresis over the next week.   Await CPK levels.   If weight continues to increase, return for reevaluation.     --Roberto Coffey MD          Follow-ups after your visit        Who to contact     Please call your clinic at [phone] to:    Ask questions about your health    Make or cancel appointments    Discuss your medicines    Learn about your test results    Speak to your doctor            Additional Information About Your Visit        [employer] Information     [employer] gives you secure access to your electronic health record. If you see a primary care provider, you can also send messages to your care team and make appointments. If you have questions, please call your primary care clinic.  If you do not have a primary care provider, please call [phone] and they will assist you.      [employer] is an electronic gateway that provides easy, online access to your medical records. With [employer], you can request a clinic appointment, read your test results, renew a prescription or communicate with your care team.     To access your existing account, please contact your [facility] or call [phone] for assistance.        Care EveryWhere ID     This is your Care EveryWhere ID. This could be used by other  "organizations to access your [place] medical records  [id number]        Your Vitals Were     Pulse Temperature Height Pulse Oximetry BMI (Body Mass Index)       57 98  F (36.7  C) (Oral) 5' 3.82\" (162.1 cm) 97% 21.32 kg/m2        Blood Pressure from Last 3 Encounters:   10/10/18 133/87   05/04/18 129/87   09/22/17 (!) 136/91    Weight from Last 3 Encounters:   10/10/18 123 lb 8 oz (56 kg)   08/23/18 115 lb (52.2 kg)   11/08/17 119 lb 3.2 oz (54.1 kg)              We Performed the Following     CK total     Comprehensive Metabolic Panel ([place])     Phosphorus     Urinalysis ([place])        Primary Care Provider Fax #    Physician No Ref-Primary [phone]       No address on file        Equal Access to Services     [facility] : Desi Bonilla, lexi tsang, avinash dupreealrosa maria jones, delgado moreno . So [facility] [phone].    ATENCIÓN: Si habla español, tiene a briscoe disposición servicios gratuitos de asistencia lingüística. Llame al [phone].    We comply with applicable federal civil rights laws and [place] laws. We do not discriminate on the basis of race, color, national origin, age, disability, sex, sexual orientation, or gender identity.            Thank you!     Thank you for choosing [facility]  for your care. Our goal is always to provide you with excellent care. Hearing back from our patients is one way we can continue to improve our services. Please take a few minutes to complete the written survey that you may receive in the mail after your visit with us. Thank you!             Your Updated Medication List - Protect others around you: Learn how to safely use, store and throw away your medicines at www.disposemymeds.org.          This list is accurate as of 10/10/18  3:24 PM.  Always use your most recent med list.                   Brand Name Dispense Instructions for use Diagnosis    hydrOXYzine 25 MG tablet    ATARAX    60 tablet    Take " 1-2 tablets (25-50 mg) by mouth every 6 hours as needed for anxiety    Generalized anxiety disorder       methylPREDNISolone 4 MG tablet    MEDROL DOSEPAK    21 tablet    Follow package instructions    Low back pain, unspecified back pain laterality, unspecified chronicity, with sciatica presence unspecified, Pain, radicular, lumbar       TYLENOL PO      Take 1,000 mg by mouth every 8 hours as needed for mild pain or fever

## 2018-11-21 ENCOUNTER — OFFICE VISIT (OUTPATIENT)
Dept: FAMILY MEDICINE | Facility: CLINIC | Age: 33
End: 2018-11-21
Payer: COMMERCIAL

## 2018-11-21 VITALS
DIASTOLIC BLOOD PRESSURE: 83 MMHG | SYSTOLIC BLOOD PRESSURE: 135 MMHG | OXYGEN SATURATION: 100 % | BODY MASS INDEX: 20.83 KG/M2 | HEIGHT: 64 IN | WEIGHT: 122 LBS | TEMPERATURE: 97.7 F | HEART RATE: 53 BPM

## 2018-11-21 DIAGNOSIS — E63.9 INADEQUATE ORAL NUTRITIONAL INTAKE: Primary | ICD-10-CM

## 2018-11-21 DIAGNOSIS — F43.10 PTSD (POST-TRAUMATIC STRESS DISORDER): ICD-10-CM

## 2018-11-21 DIAGNOSIS — F41.9 ANXIETY: ICD-10-CM

## 2018-11-21 LAB
ALBUMIN SERPL-MCNC: 4.3 G/DL (ref 3.4–5)
ALP SERPL-CCNC: 58 U/L (ref 40–150)
ALT SERPL W P-5'-P-CCNC: 39 U/L (ref 0–50)
ANION GAP SERPL CALCULATED.3IONS-SCNC: 7 MMOL/L (ref 3–14)
AST SERPL W P-5'-P-CCNC: 32 U/L (ref 0–45)
BILIRUB SERPL-MCNC: 1.4 MG/DL (ref 0.2–1.3)
BILIRUBIN UR: NEGATIVE
BLOOD UR: NEGATIVE
BUN SERPL-MCNC: 22 MG/DL (ref 7–30)
CALCIUM SERPL-MCNC: 9.1 MG/DL (ref 8.5–10.1)
CHLORIDE SERPL-SCNC: 107 MMOL/L (ref 94–109)
CO2 SERPL-SCNC: 24 MMOL/L (ref 20–32)
CREAT SERPL-MCNC: 0.74 MG/DL (ref 0.52–1.04)
ERYTHROCYTE [DISTWIDTH] IN BLOOD BY AUTOMATED COUNT: 12.6 % (ref 10–15)
FERRITIN SERPL-MCNC: 39 NG/ML (ref 12–150)
GFR SERPL CREATININE-BSD FRML MDRD: 90 ML/MIN/1.7M2
GLUCOSE SERPL-MCNC: 74 MG/DL (ref 70–99)
GLUCOSE URINE: NEGATIVE
HCG UR QL: NEGATIVE
HCT VFR BLD AUTO: 44 % (ref 35–47)
HGB BLD-MCNC: 14.7 G/DL (ref 11.7–15.7)
IRON SATN MFR SERPL: 24 % (ref 15–46)
IRON SERPL-MCNC: 99 UG/DL (ref 35–180)
KETONES UR QL: NEGATIVE
LEUKOCYTE ESTERASE UR: NEGATIVE
MAGNESIUM SERPL-MCNC: 2.3 MG/DL (ref 1.6–2.3)
MCH RBC QN AUTO: 33.2 PG (ref 26.5–33)
MCHC RBC AUTO-ENTMCNC: 33.4 G/DL (ref 31.5–36.5)
MCV RBC AUTO: 99 FL (ref 78–100)
NITRITE UR QL STRIP: NEGATIVE
PH UR STRIP: 5.5 [PH] (ref 5–7)
PHOSPHATE SERPL-MCNC: 3.1 MG/DL (ref 2.5–4.5)
PLATELET # BLD AUTO: 210 10E9/L (ref 150–450)
POTASSIUM SERPL-SCNC: 4.1 MMOL/L (ref 3.4–5.3)
PROT SERPL-MCNC: 7.4 G/DL (ref 6.8–8.8)
PROTEIN UR: NEGATIVE
RBC # BLD AUTO: 4.43 10E12/L (ref 3.8–5.2)
SODIUM SERPL-SCNC: 138 MMOL/L (ref 133–144)
SP GR UR STRIP: 1.01
TIBC SERPL-MCNC: 407 UG/DL (ref 240–430)
UROBILINOGEN UR STRIP-ACNC: NORMAL
WBC # BLD AUTO: 6 10E9/L (ref 4–11)

## 2018-11-21 RX ORDER — LANOLIN ALCOHOL/MO/W.PET/CERES
400 CREAM (GRAM) TOPICAL DAILY
COMMUNITY
Start: 2018-11-21 | End: 2020-06-19

## 2018-11-21 RX ORDER — CHLORAL HYDRATE 500 MG
2 CAPSULE ORAL DAILY
Refills: 0 | COMMUNITY
Start: 2018-11-21 | End: 2020-06-19

## 2018-11-21 RX ORDER — DIBASIC SODIUM PHOSPHATE, MONOBASIC POTASSIUM PHOSPHATE AND MONOBASIC SODIUM PHOSPHATE 852; 155; 130 MG/1; MG/1; MG/1
TABLET ORAL
Qty: 120 TABLET | COMMUNITY
Start: 2018-11-21 | End: 2020-06-19

## 2018-11-21 RX ORDER — MULTIVITAMIN,THERAPEUTIC
1 TABLET ORAL DAILY
Refills: 0 | COMMUNITY
Start: 2018-11-21 | End: 2020-06-19

## 2018-11-21 RX ORDER — FERROUS SULFATE 325(65) MG
TABLET ORAL
Qty: 100 TABLET | COMMUNITY
Start: 2018-11-21 | End: 2020-06-19

## 2018-11-21 NOTE — PROGRESS NOTES
Maria De Jesus Hess is a 33 year old female, new to my clinic. She is here for the following issues. She brings in paperwork that needs to be completed (see below).    Eating disorder  Maria De Jesus is a 32 yo woman who suffers from Anorexia nervosa. No hx of bullemia, use of laxatives or self induced vomiting. She is currently under the care of the [facility] and has counseling through that program as well as an outside therapist. She reports she is looking into entering a residential program, [facility], an inpatient eating disorder treatment program in [place], [place]. The forms will determine the level of care she receives. She has been at the [facility] for residential (January 2018-May 2018). She has been in out-patient treatment since May 2018. She has regular blood work and EKG's at the [facility] every 2 weeks.     She is a runner and the [facility] does not have a program for athletes. The [facility] treatment has a program for athletes which she feels is a better program for her. The paperwork indicates she needs extensive labs, Mantoux testing with reading and Hepatitis A vaccine or proof of immunity.     Low Ferritin  Maria De Jesus is taking ferrous sulfate 325 mg 2 tablets 3x weekly for low iron. Denies PICA or restless leg symptoms. Her periods are irregular, she has about 8 cycles per year with 1-2 days of spotting. No use of OCPs.     Vitamin D Deficiency  Maria De Jesus she has not been taking a vitamin D supplement for the past month. She was told she was low in the past and would like labs today. She gets 2 servings of calcium per day, typically greek yogurt.     Anxiety   Maria De Jesus is taking hydroxyzine 25 mg-50 mg as needed for anxiety. She has a counselor at the [facility]. No drug use. No alcohol use.    Lower leg edema after running a marathon  Maria De Jesus reports that she has had 2 episodes of swelling and after running a marathon. The first episode with the swelling was a few days after running a marathon.  "At this time, her heart rate was in the 60s this was concerning to her as it is typically in the 40s. She was also having a hard time breathing with the swelling. She did not seek medical care. The most recent episode of swelling after running a marathon was 10/10/18 and Maria De Jesus saw Dr. Anupam Coffey because she gained 8 pounds over night. Today she reports that she feels her legs are still swollen and her weight is up.     Patient Active Problem List   Diagnosis     Left hamstring injury, subsequent encounter     Inadequate oral nutritional intake     Anxiety     PTSD (post-traumatic stress disorder)     PMH, PSH, FH, medications, allergies and immunizations are updated this visit.    Current Outpatient Prescriptions   Medication Sig Dispense Refill     Acetaminophen (TYLENOL PO) Take 1,000 mg by mouth every 8 hours as needed for mild pain or fever       ferrous sulfate (IRON) 325 (65 Fe) MG tablet Take 2 tablets three times a week 100 tablet      fish oil-omega-3 fatty acids 1000 MG capsule Take 2 capsules (2 g) by mouth daily  0     hydrOXYzine (ATARAX) 25 MG tablet Take 1-2 tablets (25-50 mg) by mouth every 6 hours as needed for anxiety 60 tablet 0     magnesium oxide (MAG-OX) 400 (240 Mg) MG tablet Take 1 tablet (400 mg) by mouth daily       multivitamin, therapeutic (THERA-VIT) TABS tablet Take 1 tablet by mouth daily  0     phosphorus tablet 250 mg (PHOSPHA 250 NEUTRAL) 250 MG per tablet Take 2 po  tablet        Allergies   Allergen Reactions     Sulfa Drugs Hives        EXAM  /83  Pulse 53  Temp 97.7  F (36.5  C) (Oral)  Ht 5' 3.66\" (161.7 cm)  Wt 122 lb (55.3 kg)  LMP 10/26/2018 (Approximate)  SpO2 100%  Breastfeeding? No  BMI 21.16 kg/m2  Gen: Alert, pleasant, thin woman, NAD  HEENT:  Conjunctiva nl, TM normal bilaterally, OP clear, no posterior erythema. Thyroid normal to palpation.   COR: S1,S2, no murmur  Lungs: CTA bilaterally, no rhonchi, wheezes or rales  Abdomen: Soft, non tender, " normal bowel sounds, no HSM or mass  Ext: no peripheral edema, pulses full  Neuro: CN 2-12 grossly intact  DTR +2/4 in all extremities      Assessment:  (R63.8) Inadequate oral nutritional intake  (primary encounter diagnosis)  Comment: history of anorexia, currently enrolled in [facility]  Plan: Urinalysis ([place]), HCG Qualitative Urine         ([facility]), Comprehensive metabolic panel, CBC        with platelets, Vitamin D Deficiency, Ferritin,        Iron and iron binding capacity, Hepatitis A         Antibody IgG, Hepatitis C Screen Reflex to HCV         RNA Quant and Genotype, Magnesium, Phosphorus,         Hepatitis B Surface Antibody, TB INTRADERMAL         TEST        Will obtain labs today, place mantoux, and check immunization titer. She will return in 2 days for mantoux read, possible Hep A vaccine.     (F41.9) Anxiety  Comment: currently in counseling for anxiety, PTSD  Plan: continue intervention    (F43.10) PTSD (post-traumatic stress disorder)  Comment: currently in counseling for PTSD  Plan: continue current therapy.    Renetta Martino MD  Internal Medicine/Pediatrics    I, Desirae Cervantes, am serving as a scribe to document services personally performed by Dr. Renetta Martino, based on data collection and the provider's statements to me. Dr. Martino has reviewed, edited, and approved the above note.

## 2018-11-21 NOTE — NURSING NOTE
"33 year old  Chief Complaint   Patient presents with     Forms     pt has a form from a different provider       Blood pressure 135/83, pulse 53, temperature 97.7  F (36.5  C), temperature source Oral, height 5' 3.66\" (161.7 cm), weight 122 lb (55.3 kg), SpO2 100 %, not currently breastfeeding. Body mass index is 21.16 kg/(m^2).  Patient Active Problem List   Diagnosis     Left hamstring injury, subsequent encounter     Inadequate oral nutritional intake       Wt Readings from Last 2 Encounters:   11/21/18 122 lb (55.3 kg)   10/10/18 123 lb 8 oz (56 kg)     BP Readings from Last 3 Encounters:   11/21/18 135/83   10/10/18 133/87   05/04/18 129/87         Current Outpatient Prescriptions   Medication     Acetaminophen (TYLENOL PO)     hydrOXYzine (ATARAX) 25 MG tablet     methylPREDNISolone (MEDROL DOSEPAK) 4 MG tablet     No current facility-administered medications for this visit.        Social History   Substance Use Topics     Smoking status: Never Smoker     Smokeless tobacco: Never Used     Alcohol use No       Health Maintenance Due   Topic Date Due     HIV SCREEN (SYSTEM ASSIGNED)  05/09/2002     PAP SCREENING Q3 YR (SYSTEM ASSIGNED)  05/09/2005       No results found for: PAP      November 21, 2018 10:21 AM  "

## 2018-11-21 NOTE — MR AVS SNAPSHOT
"              After Visit Summary   11/21/2018    Maria De Jesus Hess    MRN: [id number]           Patient Information     Date Of Birth          1985        Visit Information        Provider Department      11/21/2018 10:20 AM Renetta Martino MD [facility]        Today's Diagnoses     Inadequate oral nutritional intake    -  1    Anxiety        PTSD (post-traumatic stress disorder)           Follow-ups after your visit        Who to contact     Please call your clinic at [phone] to:    Ask questions about your health    Make or cancel appointments    Discuss your medicines    Learn about your test results    Speak to your doctor            Additional Information About Your Visit        MyChart Information     [employer] gives you secure access to your electronic health record. If you see a primary care provider, you can also send messages to your care team and make appointments. If you have questions, please call your primary care clinic.  If you do not have a primary care provider, please call [phone] and they will assist you.      [employer] is an electronic gateway that provides easy, online access to your medical records. With [employer], you can request a clinic appointment, read your test results, renew a prescription or communicate with your care team.     To access your existing account, please contact your [facility] or call [phone] for assistance.        Care EveryWhere ID     This is your Care EveryWhere ID. This could be used by other organizations to access your [facility] medical records  [id number]        Your Vitals Were     Pulse Temperature Height Last Period Pulse Oximetry Breastfeeding?    53 97.7  F (36.5  C) (Oral) 5' 3.66\" (161.7 cm) 10/26/2018 (Approximate) 100% No    BMI (Body Mass Index)                   21.16 kg/m2            Blood Pressure from Last 3 Encounters:   11/23/18 124/81   11/21/18 135/83   10/10/18 133/87    Weight from Last " 3 Encounters:   11/23/18 120 lb 4 oz (54.5 kg)   11/21/18 122 lb (55.3 kg)   10/10/18 123 lb 8 oz (56 kg)              We Performed the Following     CBC with platelets     Comprehensive metabolic panel     Ferritin     HCG Qualitative Urine ([place])     Hepatitis A Antibody IgG     Hepatitis B Surface Antibody     Hepatitis C Screen Reflex to HCV RNA Quant and Genotype     Iron and iron binding capacity     Magnesium     Phosphorus     TB INTRADERMAL TEST     Urinalysis ([place])     Vitamin D Deficiency        Primary Care Provider Fax #    Physician No Ref-Primary [phone]       No address on file        Equal Access to Services     JACKELYN SHI : Hadii lalo monaco Sojamie, waaxda luqadaha, qaybta kaalmada karen, delgado moreno . So [facility] [phone].    ATENCIÓN: Si habla español, tiene a briscoe disposición servicios gratuitos de asistencia lingüística. Llame al [phone].    We comply with applicable federal civil rights laws and [place] laws. We do not discriminate on the basis of race, color, national origin, age, disability, sex, sexual orientation, or gender identity.            Thank you!     Thank you for choosing [facility]  for your care. Our goal is always to provide you with excellent care. Hearing back from our patients is one way we can continue to improve our services. Please take a few minutes to complete the written survey that you may receive in the mail after your visit with us. Thank you!             Your Updated Medication List - Protect others around you: Learn how to safely use, store and throw away your medicines at www.disposemymeds.org.          This list is accurate as of 11/21/18 11:59 PM.  Always use your most recent med list.                   Brand Name Dispense Instructions for use Diagnosis    ferrous sulfate 325 (65 Fe) MG tablet    FEROSUL    100 tablet    Take 2 tablets three times a week        fish oil-omega-3 fatty acids 1000  MG capsule      Take 2 capsules (2 g) by mouth daily        hydrOXYzine 25 MG tablet    ATARAX    60 tablet    Take 1-2 tablets (25-50 mg) by mouth every 6 hours as needed for anxiety    Generalized anxiety disorder       magnesium oxide 400 (240 Mg) MG tablet    MAG-OX     Take 1 tablet (400 mg) by mouth daily        multivitamin, therapeutic Tabs tablet      Take 1 tablet by mouth daily        phosphorus tablet 250 mg 250 MG per tablet     120 tablet    Take 2 po BID        TYLENOL PO      Take 1,000 mg by mouth every 8 hours as needed for mild pain or fever

## 2018-11-23 ENCOUNTER — OFFICE VISIT (OUTPATIENT)
Dept: FAMILY MEDICINE | Facility: CLINIC | Age: 33
End: 2018-11-23
Payer: COMMERCIAL

## 2018-11-23 VITALS
DIASTOLIC BLOOD PRESSURE: 81 MMHG | WEIGHT: 120.25 LBS | RESPIRATION RATE: 16 BRPM | BODY MASS INDEX: 20.86 KG/M2 | HEART RATE: 53 BPM | SYSTOLIC BLOOD PRESSURE: 124 MMHG | OXYGEN SATURATION: 100 %

## 2018-11-23 DIAGNOSIS — Z23 NEED FOR HEPATITIS A IMMUNIZATION: Primary | ICD-10-CM

## 2018-11-23 DIAGNOSIS — E63.9 INADEQUATE ORAL NUTRITIONAL INTAKE: ICD-10-CM

## 2018-11-23 LAB
DEPRECATED CALCIDIOL+CALCIFEROL SERPL-MC: 50 UG/L (ref 20–75)
HAV IGG SER QL IA: NONREACTIVE
HBV SURFACE AB SERPL IA-ACNC: 275.12 M[IU]/ML
HCV AB SERPL QL IA: NONREACTIVE

## 2018-11-23 ASSESSMENT — PAIN SCALES - GENERAL: PAINLEVEL: NO PAIN (0)

## 2018-11-23 NOTE — NURSING NOTE
Screening Questionnaire for Adult Immunization    Are you sick today?   No   Do you have allergies to medications, food, a vaccine component or latex?   No   Have you ever had a serious reaction after receiving a vaccination?   No   Do you have a long-term health problem with heart disease, lung disease, asthma, kidney disease, metabolic disease (e.g. diabetes), anemia, or other blood disorder?   No   Do you have cancer, leukemia, HIV/AIDS, or any other immune system problem?   No   In the past 3 months, have you taken medications that affect  your immune system, such as prednisone, other steroids, or anticancer drugs; drugs for the treatment of rheumatoid arthritis, Crohn s disease, or psoriasis; or have you had radiation treatments?   No   Have you had a seizure, or a brain or other nervous system problem?   No   During the past year, have you received a transfusion of blood or blood     products, or been given immune (gamma) globulin or antiviral drug?   No   For women: Are you pregnant or is there a chance you could become        pregnant during the next month?   No   Have you received any vaccinations in the past 4 weeks?   No     Immunization questionnaire answers were all negative.        Per orders of Dr. Martino, injection of Hep A given by Nohelia Tejada. Patient instructed to remain in clinic for 15 minutes afterwards, and to report any adverse reaction to me immediately.       Screening performed by Nohelia Tejada on 11/23/2018 at 3:43 PM.

## 2018-11-23 NOTE — PROGRESS NOTES
Maria De Jesus Hess is a 33 year old female here for the following issues:    Inadequate oral nutritional intake  Maria De Jesus was last here 11/21 to complete forms for [facility], inpatient eating disorder treatment in [place], [place]. She is here today to complete the paperwork and review her lab results.     Hepatitis A antibody was non-reactive, she will need the hepatitis A vaccine today and a booster in 6 months. She is immune to Hep B    Mantoux placed 11/21 on left forearm. Read 11/23 as zero induration.      Labs reviewed with Maria De Jesus, all normal    Results for orders placed or performed in visit on 11/21/18   Urinalysis ([place])   Result Value Ref Range    Specific Gravity Urine 1.010 1.005 - 1.030    pH Urine 5.5 4.5 - 8.0    Leukocyte Esterase UR Negative Negative    Nitrite Urine Negative Negative    Protein UR Negative Negative    Glucose Urine Negative Negative    Ketones Urine Negative Negative    Urobilinogen mg/dL 0.2 E.U./dL 0.2 E.U./dL    Bilirubin UR Negative Negative    Blood UR Negative Negative   HCG Qualitative Urine ([place])   Result Value Ref Range    HCG Qual Urine NEGATIVE Negative   Comprehensive metabolic panel   Result Value Ref Range    Sodium 138 133 - 144 mmol/L    Potassium 4.1 3.4 - 5.3 mmol/L    Chloride 107 94 - 109 mmol/L    Carbon Dioxide 24 20 - 32 mmol/L    Anion Gap 7 3 - 14 mmol/L    Glucose 74 70 - 99 mg/dL    Urea Nitrogen 22 7 - 30 mg/dL    Creatinine 0.74 0.52 - 1.04 mg/dL    GFR Estimate 90 >60 mL/min/1.7m2    GFR Estimate If Black >90 >60 mL/min/1.7m2    Calcium 9.1 8.5 - 10.1 mg/dL    Bilirubin Total 1.4 (H) 0.2 - 1.3 mg/dL    Albumin 4.3 3.4 - 5.0 g/dL    Protein Total 7.4 6.8 - 8.8 g/dL    Alkaline Phosphatase 58 40 - 150 U/L    ALT 39 0 - 50 U/L    AST 32 0 - 45 U/L   CBC with platelets   Result Value Ref Range    WBC 6.0 4.0 - 11.0 10e9/L    RBC Count 4.43 3.8 - 5.2 10e12/L    Hemoglobin 14.7 11.7 - 15.7 g/dL    Hematocrit 44.0 35.0 - 47.0 %    MCV 99 78 - 100  fl    MCH 33.2 (H) 26.5 - 33.0 pg    MCHC 33.4 31.5 - 36.5 g/dL    RDW 12.6 10.0 - 15.0 %    Platelet Count 210 150 - 450 10e9/L   Vitamin D Deficiency   Result Value Ref Range    Vitamin D Deficiency screening 50 20 - 75 ug/L   Ferritin   Result Value Ref Range    Ferritin 39 12 - 150 ng/mL   Iron and iron binding capacity   Result Value Ref Range    Iron 99 35 - 180 ug/dL    Iron Binding Cap 407 240 - 430 ug/dL    Iron Saturation Index 24 15 - 46 %   Hepatitis A Antibody IgG   Result Value Ref Range    Hepatitis A Antibody IgG Nonreactive NR^Nonreactive   Hepatitis C Screen Reflex to HCV RNA Quant and Genotype   Result Value Ref Range    Hepatitis C Antibody Nonreactive NR^Nonreactive   Magnesium   Result Value Ref Range    Magnesium 2.3 1.6 - 2.3 mg/dL   Phosphorus   Result Value Ref Range    Phosphorus 3.1 2.5 - 4.5 mg/dL   Hepatitis B Surface Antibody   Result Value Ref Range    Hepatitis B Surface Antibody 275.12 (H) <8.00 m[IU]/mL         Patient Active Problem List   Diagnosis     Left hamstring injury, subsequent encounter     Inadequate oral nutritional intake     Anxiety     PTSD (post-traumatic stress disorder)       Current Outpatient Prescriptions   Medication Sig Dispense Refill     Acetaminophen (TYLENOL PO) Take 1,000 mg by mouth every 8 hours as needed for mild pain or fever       ferrous sulfate (IRON) 325 (65 Fe) MG tablet Take 2 tablets three times a week 100 tablet      fish oil-omega-3 fatty acids 1000 MG capsule Take 2 capsules (2 g) by mouth daily  0     hydrOXYzine (ATARAX) 25 MG tablet Take 1-2 tablets (25-50 mg) by mouth every 6 hours as needed for anxiety 60 tablet 0     magnesium oxide (MAG-OX) 400 (240 Mg) MG tablet Take 1 tablet (400 mg) by mouth daily       multivitamin, therapeutic (THERA-VIT) TABS tablet Take 1 tablet by mouth daily  0     phosphorus tablet 250 mg (PHOSPHA 250 NEUTRAL) 250 MG per tablet Take 2 po  tablet        Allergies   Allergen Reactions     Sulfa Drugs  Hives        EXAM  /81 (BP Location: Right arm, Patient Position: Standing, Cuff Size: Adult Regular)  Pulse 53  Resp 16  Wt 120 lb 4 oz (54.5 kg)  LMP 10/26/2018 (Approximate)  SpO2 100%  BMI 20.86 kg/m2  Gen: Alert, pleasant, NAD      Assessment:  (Z23) Need for hepatitis A immunization  (primary encounter diagnosis)  Comment: due for routine vaccine as she is not immune  Plan: HEPA VACCINE ADULT IM        Return in 6 months for second vaccine    (R63.8) Inadequate oral nutritional intake  Comment: hx of eating disorder  Plan: forms are completed for eating disorder program in [facility]  Information is faxed today      Renetta Martino MD  Internal Medicine/Pediatrics    I, Desirae Cervantes, am serving as a scribe to document services personally performed by Dr. Renetta Martino, based on data collection and the provider's statements to me. Dr. Martino has reviewed, edited, and approved the above note.

## 2018-11-23 NOTE — NURSING NOTE
33 year old  Chief Complaint   Patient presents with     RECHECK     Needs a mantoux, possible Hep A, and a standing BP       Blood pressure 124/81, pulse 53, resp. rate 16, weight 120 lb 4 oz (54.5 kg), last menstrual period 10/26/2018, SpO2 100 %, not currently breastfeeding. Body mass index is 20.86 kg/(m^2).  Patient Active Problem List   Diagnosis     Left hamstring injury, subsequent encounter     Inadequate oral nutritional intake     Anxiety     PTSD (post-traumatic stress disorder)       Wt Readings from Last 2 Encounters:   11/23/18 120 lb 4 oz (54.5 kg)   11/21/18 122 lb (55.3 kg)     BP Readings from Last 3 Encounters:   11/23/18 124/81   11/21/18 135/83   10/10/18 133/87         Current Outpatient Prescriptions   Medication     Acetaminophen (TYLENOL PO)     ferrous sulfate (IRON) 325 (65 Fe) MG tablet     fish oil-omega-3 fatty acids 1000 MG capsule     hydrOXYzine (ATARAX) 25 MG tablet     magnesium oxide (MAG-OX) 400 (240 Mg) MG tablet     multivitamin, therapeutic (THERA-VIT) TABS tablet     phosphorus tablet 250 mg (PHOSPHA 250 NEUTRAL) 250 MG per tablet     No current facility-administered medications for this visit.        Social History   Substance Use Topics     Smoking status: Never Smoker     Smokeless tobacco: Never Used     Alcohol use No       Health Maintenance Due   Topic Date Due     HIV SCREEN (SYSTEM ASSIGNED)  05/09/2002     PAP SCREENING Q3 YR (SYSTEM ASSIGNED)  05/09/2005       No results found for: PAP      November 23, 2018 3:10 PM

## 2018-11-23 NOTE — MR AVS SNAPSHOT
After Visit Summary   11/23/2018    Maria De Jesus Hess    MRN: [id number]           Patient Information     Date Of Birth          1985        Visit Information        Provider Department      11/23/2018 3:00 PM Renetta Martino MD [facility]        Today's Diagnoses     Need for hepatitis A immunization    -  1       Follow-ups after your visit        Who to contact     Please call your clinic at [phone] to:    Ask questions about your health    Make or cancel appointments    Discuss your medicines    Learn about your test results    Speak to your doctor            Additional Information About Your Visit        MyChart Information     [employer] gives you secure access to your electronic health record. If you see a primary care provider, you can also send messages to your care team and make appointments. If you have questions, please call your primary care clinic.  If you do not have a primary care provider, please call [phone] and they will assist you.      [employer] is an electronic gateway that provides easy, online access to your medical records. With [employer], you can request a clinic appointment, read your test results, renew a prescription or communicate with your care team.     To access your existing account, please contact your [facility] or call [phone] for assistance.        Care EveryWhere ID     This is your Care EveryWhere ID. This could be used by other organizations to access your [place] medical records  [id number]        Your Vitals Were     Pulse Respirations Last Period Pulse Oximetry BMI (Body Mass Index)       53 16 10/26/2018 (Approximate) 100% 20.86 kg/m2        Blood Pressure from Last 3 Encounters:   11/23/18 124/81   11/21/18 135/83   10/10/18 133/87    Weight from Last 3 Encounters:   11/23/18 120 lb 4 oz (54.5 kg)   11/21/18 122 lb (55.3 kg)   10/10/18 123 lb 8 oz (56 kg)              We Performed the Following      HEPA VACCINE ADULT IM        Primary Care Provider Fax #    Physician No Ref-Primary [phone]       No address on file        Equal Access to Services     CESAR SHI : Hadii aad ku hadamarivance Bonilla, arabellayenni mimscamilleha, avinash jones, delgado bosch dawoodtae dawnedouard ursula [facility] [phone].    ATENCIÓN: Si habla español, tiene a briscoe disposición servicios gratuitos de asistencia lingüística. Llame al [phone].    We comply with applicable federal civil rights laws and [place] laws. We do not discriminate on the basis of race, color, national origin, age, disability, sex, sexual orientation, or gender identity.            Thank you!     Thank you for choosing [facility]  for your care. Our goal is always to provide you with excellent care. Hearing back from our patients is one way we can continue to improve our services. Please take a few minutes to complete the written survey that you may receive in the mail after your visit with us. Thank you!             Your Updated Medication List - Protect others around you: Learn how to safely use, store and throw away your medicines at www.disposemymeds.org.          This list is accurate as of 11/23/18  3:35 PM.  Always use your most recent med list.                   Brand Name Dispense Instructions for use Diagnosis    ferrous sulfate 325 (65 Fe) MG tablet    IRON    100 tablet    Take 2 tablets three times a week        fish oil-omega-3 fatty acids 1000 MG capsule      Take 2 capsules (2 g) by mouth daily        hydrOXYzine 25 MG tablet    ATARAX    60 tablet    Take 1-2 tablets (25-50 mg) by mouth every 6 hours as needed for anxiety    Generalized anxiety disorder       magnesium oxide 400 (240 Mg) MG tablet    MAG-OX     Take 1 tablet (400 mg) by mouth daily        multivitamin, therapeutic Tabs tablet      Take 1 tablet by mouth daily        phosphorus tablet 250 mg 250 MG per tablet     120 tablet    Take 2 po BID        TYLENOL PO       Take 1,000 mg by mouth every 8 hours as needed for mild pain or fever

## 2019-01-02 ENCOUNTER — TRANSFERRED RECORDS (OUTPATIENT)
Dept: HEALTH INFORMATION MANAGEMENT | Facility: CLINIC | Age: 34
End: 2019-01-02

## 2019-05-03 DIAGNOSIS — F50.9 EATING DISORDER: ICD-10-CM

## 2019-05-03 LAB
ALBUMIN SERPL-MCNC: 4.2 G/DL (ref 3.4–5)
ALP SERPL-CCNC: 62 U/L (ref 40–150)
ALT SERPL W P-5'-P-CCNC: 56 U/L (ref 0–50)
ANION GAP SERPL CALCULATED.3IONS-SCNC: 2 MMOL/L (ref 3–14)
AST SERPL W P-5'-P-CCNC: 44 U/L (ref 0–45)
BILIRUB DIRECT SERPL-MCNC: 0.2 MG/DL (ref 0–0.2)
BILIRUB SERPL-MCNC: 0.6 MG/DL (ref 0.2–1.3)
BUN SERPL-MCNC: 16 MG/DL (ref 7–30)
CALCIUM SERPL-MCNC: 9.6 MG/DL (ref 8.5–10.1)
CHLORIDE SERPL-SCNC: 104 MMOL/L (ref 94–109)
CO2 SERPL-SCNC: 27 MMOL/L (ref 20–32)
CREAT SERPL-MCNC: 0.82 MG/DL (ref 0.52–1.04)
ERYTHROCYTE [DISTWIDTH] IN BLOOD BY AUTOMATED COUNT: 12.2 % (ref 10–15)
GFR SERPL CREATININE-BSD FRML MDRD: >90 ML/MIN/{1.73_M2}
GLUCOSE SERPL-MCNC: 80 MG/DL (ref 70–99)
HCT VFR BLD AUTO: 41.6 % (ref 35–47)
HGB BLD-MCNC: 14 G/DL (ref 11.7–15.7)
MCH RBC QN AUTO: 33.7 PG (ref 26.5–33)
MCHC RBC AUTO-ENTMCNC: 33.7 G/DL (ref 31.5–36.5)
MCV RBC AUTO: 100 FL (ref 78–100)
PLATELET # BLD AUTO: 187 10E9/L (ref 150–450)
POTASSIUM SERPL-SCNC: 5.5 MMOL/L (ref 3.4–5.3)
PROT SERPL-MCNC: 7.3 G/DL (ref 6.8–8.8)
RBC # BLD AUTO: 4.15 10E12/L (ref 3.8–5.2)
SODIUM SERPL-SCNC: 134 MMOL/L (ref 133–144)
WBC # BLD AUTO: 5.6 10E9/L (ref 4–11)

## 2019-05-06 LAB — INTERPRETATION ECG - MUSE: NORMAL

## 2019-05-24 ENCOUNTER — TELEPHONE (OUTPATIENT)
Dept: ORTHOPEDICS | Facility: CLINIC | Age: 34
End: 2019-05-24

## 2019-05-24 DIAGNOSIS — F50.9 EATING DISORDER: Primary | ICD-10-CM

## 2019-05-24 DIAGNOSIS — F50.9 EATING DISORDER: ICD-10-CM

## 2019-05-24 LAB
ALBUMIN SERPL-MCNC: 4.3 G/DL (ref 3.4–5)
ALP SERPL-CCNC: 69 U/L (ref 40–150)
ALT SERPL W P-5'-P-CCNC: 51 U/L (ref 0–50)
ANION GAP SERPL CALCULATED.3IONS-SCNC: 4 MMOL/L (ref 3–14)
AST SERPL W P-5'-P-CCNC: 54 U/L (ref 0–45)
BILIRUB DIRECT SERPL-MCNC: 0.1 MG/DL (ref 0–0.2)
BILIRUB SERPL-MCNC: 0.5 MG/DL (ref 0.2–1.3)
BUN SERPL-MCNC: 27 MG/DL (ref 7–30)
CALCIUM SERPL-MCNC: 9.7 MG/DL (ref 8.5–10.1)
CHLORIDE SERPL-SCNC: 106 MMOL/L (ref 94–109)
CO2 SERPL-SCNC: 28 MMOL/L (ref 20–32)
CREAT SERPL-MCNC: 0.82 MG/DL (ref 0.52–1.04)
ERYTHROCYTE [DISTWIDTH] IN BLOOD BY AUTOMATED COUNT: 12.4 % (ref 10–15)
GFR SERPL CREATININE-BSD FRML MDRD: >90 ML/MIN/{1.73_M2}
GLUCOSE SERPL-MCNC: 83 MG/DL (ref 70–99)
HCT VFR BLD AUTO: 44.2 % (ref 35–47)
HGB BLD-MCNC: 15.2 G/DL (ref 11.7–15.7)
MAGNESIUM SERPL-MCNC: 2.7 MG/DL (ref 1.6–2.3)
MCH RBC QN AUTO: 34 PG (ref 26.5–33)
MCHC RBC AUTO-ENTMCNC: 34.4 G/DL (ref 31.5–36.5)
MCV RBC AUTO: 99 FL (ref 78–100)
PHOSPHATE SERPL-MCNC: 3.9 MG/DL (ref 2.5–4.5)
PLATELET # BLD AUTO: 208 10E9/L (ref 150–450)
POTASSIUM SERPL-SCNC: 4.8 MMOL/L (ref 3.4–5.3)
PROT SERPL-MCNC: 7.5 G/DL (ref 6.8–8.8)
RBC # BLD AUTO: 4.47 10E12/L (ref 3.8–5.2)
SODIUM SERPL-SCNC: 137 MMOL/L (ref 133–144)
WBC # BLD AUTO: 6 10E9/L (ref 4–11)

## 2019-05-24 NOTE — TELEPHONE ENCOUNTER
Returned call to patient to inform her that Dr. Betts did place the lab orders and she should be good to go. She had no further questions.

## 2019-05-24 NOTE — TELEPHONE ENCOUNTER
[facility]    Phone Message    May a detailed message be left on voicemail: yes    Reason for Call: Other: Pt called and stated that she urgently for her health needs these 2 labs added to her ordrs, Magnesium and Phosphorus. Pt sent over a [employer] message on 5/22 and did not hear anything back. Pt requesting call back asap when orders have been put in     Action Taken: Message routed to:  [facility] ([facility] sports med

## 2019-10-22 ENCOUNTER — MYC MEDICAL ADVICE (OUTPATIENT)
Dept: ORTHOPEDICS | Facility: CLINIC | Age: 34
End: 2019-10-22

## 2019-10-22 DIAGNOSIS — F50.89 OTHER DISORDER OF EATING: Primary | ICD-10-CM

## 2019-10-24 DIAGNOSIS — F50.9 EATING DISORDER: ICD-10-CM

## 2019-10-24 DIAGNOSIS — F50.89 OTHER DISORDER OF EATING: ICD-10-CM

## 2019-10-24 LAB
ALBUMIN SERPL-MCNC: 3.8 G/DL (ref 3.4–5)
ALP SERPL-CCNC: 72 U/L (ref 40–150)
ALT SERPL W P-5'-P-CCNC: 35 U/L (ref 0–50)
ANION GAP SERPL CALCULATED.3IONS-SCNC: 7 MMOL/L (ref 3–14)
AST SERPL W P-5'-P-CCNC: 33 U/L (ref 0–45)
BASOPHILS # BLD AUTO: 0 10E9/L (ref 0–0.2)
BASOPHILS NFR BLD AUTO: 0.8 %
BILIRUB DIRECT SERPL-MCNC: 0.1 MG/DL (ref 0–0.2)
BILIRUB SERPL-MCNC: 0.5 MG/DL (ref 0.2–1.3)
BUN SERPL-MCNC: 15 MG/DL (ref 7–30)
CALCIUM SERPL-MCNC: 8.9 MG/DL (ref 8.5–10.1)
CHLORIDE SERPL-SCNC: 104 MMOL/L (ref 94–109)
CO2 SERPL-SCNC: 26 MMOL/L (ref 20–32)
CREAT SERPL-MCNC: 0.79 MG/DL (ref 0.52–1.04)
DIFFERENTIAL METHOD BLD: NORMAL
EOSINOPHIL # BLD AUTO: 0.1 10E9/L (ref 0–0.7)
EOSINOPHIL NFR BLD AUTO: 1 %
ERYTHROCYTE [DISTWIDTH] IN BLOOD BY AUTOMATED COUNT: 12.2 % (ref 10–15)
GFR SERPL CREATININE-BSD FRML MDRD: >90 ML/MIN/{1.73_M2}
GLUCOSE SERPL-MCNC: 78 MG/DL (ref 70–99)
HCT VFR BLD AUTO: 42.2 % (ref 35–47)
HGB BLD-MCNC: 13.9 G/DL (ref 11.7–15.7)
IMM GRANULOCYTES # BLD: 0 10E9/L (ref 0–0.4)
IMM GRANULOCYTES NFR BLD: 0.2 %
LYMPHOCYTES # BLD AUTO: 1.5 10E9/L (ref 0.8–5.3)
LYMPHOCYTES NFR BLD AUTO: 28.2 %
MAGNESIUM SERPL-MCNC: 2.2 MG/DL (ref 1.6–2.3)
MCH RBC QN AUTO: 32.1 PG (ref 26.5–33)
MCHC RBC AUTO-ENTMCNC: 32.9 G/DL (ref 31.5–36.5)
MCV RBC AUTO: 98 FL (ref 78–100)
MONOCYTES # BLD AUTO: 0.4 10E9/L (ref 0–1.3)
MONOCYTES NFR BLD AUTO: 8.3 %
NEUTROPHILS # BLD AUTO: 3.2 10E9/L (ref 1.6–8.3)
NEUTROPHILS NFR BLD AUTO: 61.5 %
NRBC # BLD AUTO: 0 10*3/UL
NRBC BLD AUTO-RTO: 0 /100
PHOSPHATE SERPL-MCNC: 3.7 MG/DL (ref 2.5–4.5)
PLATELET # BLD AUTO: 236 10E9/L (ref 150–450)
POTASSIUM SERPL-SCNC: 4.7 MMOL/L (ref 3.4–5.3)
PROT SERPL-MCNC: 7.2 G/DL (ref 6.8–8.8)
RBC # BLD AUTO: 4.33 10E12/L (ref 3.8–5.2)
SODIUM SERPL-SCNC: 137 MMOL/L (ref 133–144)
WBC # BLD AUTO: 5.2 10E9/L (ref 4–11)

## 2019-10-25 LAB — INTERPRETATION ECG - MUSE: NORMAL

## 2019-11-15 ENCOUNTER — OFFICE VISIT (OUTPATIENT)
Dept: ORTHOPEDICS | Facility: CLINIC | Age: 34
End: 2019-11-15
Payer: COMMERCIAL

## 2019-11-15 VITALS — DIASTOLIC BLOOD PRESSURE: 71 MMHG | SYSTOLIC BLOOD PRESSURE: 107 MMHG | HEART RATE: 52 BPM

## 2019-11-15 DIAGNOSIS — F50.00 ANOREXIA NERVOSA (H): Primary | ICD-10-CM

## 2019-11-15 NOTE — LETTER
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  I haven't seen her a while but she reports needing a physician to help manage her medical issues associated with AN and bone health.  Maria De Jesus was at the [facility] but was worsening and then sought treatment at the recommendation of her treatment team at the [facility] which is known for treating high level athletes with ED.  She was there for many months and improved and returned home to [place].  She went back into the [facility] and they are suggesting a possible return to the [facility] since she has been having setbacks again recently.  Her employer has been very supportive and Maria De Jesus even disclosed her ED to her team at work.      -No menses for 8 months but now having them again on her own.    -She is running again about 40-50 miles per week, against the recommendations of her treatment team, after not exercising at all for many months. She reports that it helps her to manage her anxiety better.   -No injuries.   -She reports having very low heart rates while in [facility] which improved with refeeding.  -Significant LFT elevations with refeeding but that eventually normalized.     Current Outpatient Medications   Medication     Acetaminophen (TYLENOL PO)     ferrous sulfate (IRON) 325 (65 Fe) MG tablet     fish oil-omega-3 fatty acids 1000 MG capsule     hydrOXYzine (ATARAX) 25 MG tablet     magnesium oxide (MAG-OX) 400 (240 Mg) MG tablet     multivitamin, therapeutic (THERA-VIT) TABS tablet     phosphorus tablet 250 mg (PHOSPHA 250 NEUTRAL) 250 MG per tablet     No current facility-administered medications for this visit.        O: NAD  Less anxious  /71 (BP Location: Right arm, Patient Position: Sitting, Cuff Size: Adult Regular)   Pulse 52     HEENT; neg  Neck; no LAD or thyromegaly  Heart; slow  rate, regular rhythm  Abd: benign    A: Anorexia Nervosa in a runner: improved with intensive treatment but now regressing   H/o amenorrhea    P:  I congratulated on her on her success and improvements.  She and I are concerned about her regressing.  We discussed the role of exercise at length.   I have ordered a repeat DXA so that she has a current one (last one in 207).  She will return to see me in Dec to review the DXA and consider any additional lab work-up that is needed at that time.     We discussed calcium and Vit D intake as well.     > 25 min of total time spent in one-on-one evalution and discussion with patient regarding nature of problem, course, prior treatments, and therapeutic options,> 50% of which was spent in counseling and coordination of care:      Ludy Betts MD, CAQ, FACSM, CCD  [facility]  Sports Medicine and Bone Health  Team Physician;  Athletics                          Current Outpatient Medications   Medication     Acetaminophen (TYLENOL PO)     ferrous sulfate (IRON) 325 (65 Fe) MG tablet     fish oil-omega-3 fatty acids 1000 MG capsule     hydrOXYzine (ATARAX) 25 MG tablet     magnesium oxide (MAG-OX) 400 (240 Mg) MG tablet     multivitamin, therapeutic (THERA-VIT) TABS tablet     phosphorus tablet 250 mg (PHOSPHA 250 NEUTRAL) 250 MG per tablet     No current facility-administered medications for this visit.        O: NAD  /71 (BP Location: Right arm, Patient Position: Sitting, Cuff Size: Adult Regular)   Pulse 52                         Results for LUANN HART (MRN [id number]) as of 11/15/2019 08:35   Ref. Range 10/24/2019 15:33   Sodium Latest Ref Range: 133 - 144 mmol/L 137   Potassium Latest Ref Range: 3.4 - 5.3 mmol/L 4.7   Chloride Latest Ref Range: 94 - 109 mmol/L 104   Carbon Dioxide Latest Ref Range: 20 - 32 mmol/L 26   Urea Nitrogen Latest Ref Range: 7 - 30 mg/dL 15   Creatinine Latest Ref Range: 0.52 - 1.04 mg/dL 0.79   GFR Estimate Latest  Ref Range: >60 mL/min/1.73_m2 >90   GFR Estimate If Black Latest Ref Range: >60 mL/min/1.73_m2 >90   Calcium Latest Ref Range: 8.5 - 10.1 mg/dL 8.9   Anion Gap Latest Ref Range: 3 - 14 mmol/L 7   Magnesium Latest Ref Range: 1.6 - 2.3 mg/dL 2.2   Phosphorus Latest Ref Range: 2.5 - 4.5 mg/dL 3.7   Albumin Latest Ref Range: 3.4 - 5.0 g/dL 3.8   Protein Total Latest Ref Range: 6.8 - 8.8 g/dL 7.2   Bilirubin Total Latest Ref Range: 0.2 - 1.3 mg/dL 0.5   Alkaline Phosphatase Latest Ref Range: 40 - 150 U/L 72   ALT Latest Ref Range: 0 - 50 U/L 35   AST Latest Ref Range: 0 - 45 U/L 33   Bilirubin Direct Latest Ref Range: 0.0 - 0.2 mg/dL 0.1   Glucose Latest Ref Range: 70 - 99 mg/dL 78   WBC Latest Ref Range: 4.0 - 11.0 10e9/L 5.2   Hemoglobin Latest Ref Range: 11.7 - 15.7 g/dL 13.9   Hematocrit Latest Ref Range: 35.0 - 47.0 % 42.2   Platelet Count Latest Ref Range: 150 - 450 10e9/L 236   RBC Count Latest Ref Range: 3.8 - 5.2 10e12/L 4.33   MCV Latest Ref Range: 78 - 100 fl 98   MCH Latest Ref Range: 26.5 - 33.0 pg 32.1   MCHC Latest Ref Range: 31.5 - 36.5 g/dL 32.9   RDW Latest Ref Range: 10.0 - 15.0 % 12.2   Diff Method Unknown Automated Method   % Neutrophils Latest Units: % 61.5   % Lymphocytes Latest Units: % 28.2   % Monocytes Latest Units: % 8.3   % Eosinophils Latest Units: % 1.0   % Basophils Latest Units: % 0.8   % Immature Granulocytes Latest Units: % 0.2   Nucleated RBCs Latest Ref Range: 0 /100 0   Absolute Neutrophil Latest Ref Range: 1.6 - 8.3 10e9/L 3.2   Absolute Lymphocytes Latest Ref Range: 0.8 - 5.3 10e9/L 1.5   Absolute Monocytes Latest Ref Range: 0.0 - 1.3 10e9/L 0.4   Absolute Eosinophils Latest Ref Range: 0.0 - 0.7 10e9/L 0.1   Absolute Basophils Latest Ref Range: 0.0 - 0.2 10e9/L 0.0   Abs Immature Granulocytes Latest Ref Range: 0 - 0.4 10e9/L 0.0   Absolute Nucleated RBC Unknown 0.0       DXA 2017 lowest Z-score = -0.9        A:        P:     DXA  I will review those and determine if new labs are  needed.    RTC in [facility]    Ludy Betts MD

## 2019-11-15 NOTE — PROGRESS NOTES
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  I haven't seen her a while but she reports needing a physician to help manage her medical issues associated with AN and bone health.  Maria De Jesus was at the [facility] but was worsening and then sought treatment at the recommendation of her treatment team at the [facility] which is known for treating high level athletes with ED.  She was there for many months and improved and returned home to [place].  She went back into the [facility] and they are suggesting a possible return to the [facility] since she has been having setbacks again recently.  Her employer has been very supportive and Maria De Jesus even disclosed her ED to her team at work.      -No menses for 8 months but now having them again on her own.    -She is running again about 40-50 miles per week, against the recommendations of her treatment team, after not exercising at all for many months. She reports that it helps her to manage her anxiety better.   -No injuries.   -She reports having very low heart rates while in [facility] which improved with refeeding.  -Significant LFT elevations with refeeding but that eventually normalized.     Current Outpatient Medications   Medication     Acetaminophen (TYLENOL PO)     ferrous sulfate (IRON) 325 (65 Fe) MG tablet     fish oil-omega-3 fatty acids 1000 MG capsule     hydrOXYzine (ATARAX) 25 MG tablet     magnesium oxide (MAG-OX) 400 (240 Mg) MG tablet     multivitamin, therapeutic (THERA-VIT) TABS tablet     phosphorus tablet 250 mg (PHOSPHA 250 NEUTRAL) 250 MG per tablet     No current facility-administered medications for this visit.        O: NAD  Less anxious  /71 (BP Location: Right arm, Patient Position: Sitting, Cuff Size: Adult Regular)   Pulse 52     HEENT; neg  Neck; no LAD or thyromegaly  Heart; slow rate, regular rhythm  Abd: benign    A: Anorexia Nervosa in a runner: improved with intensive  treatment but now regressing   H/o amenorrhea    P:  I congratulated on her on her success and improvements.  She and I are concerned about her regressing.  We discussed the role of exercise at length.   I have ordered a repeat DXA so that she has a current one (last one in 207).  She will return to see me in Dec to review the DXA and consider any additional lab work-up that is needed at that time.     We discussed calcium and Vit D intake as well.     > 25 min of total time spent in one-on-one evalution and discussion with patient regarding nature of problem, course, prior treatments, and therapeutic options,> 50% of which was spent in counseling and coordination of care:      Ludy Betts MD, CAQ, FACSM, CCD  [facility]  Sports Medicine and Bone Health  Team Physician;  Athletics                          Current Outpatient Medications   Medication     Acetaminophen (TYLENOL PO)     ferrous sulfate (IRON) 325 (65 Fe) MG tablet     fish oil-omega-3 fatty acids 1000 MG capsule     hydrOXYzine (ATARAX) 25 MG tablet     magnesium oxide (MAG-OX) 400 (240 Mg) MG tablet     multivitamin, therapeutic (THERA-VIT) TABS tablet     phosphorus tablet 250 mg (PHOSPHA 250 NEUTRAL) 250 MG per tablet     No current facility-administered medications for this visit.        O: NAD  /71 (BP Location: Right arm, Patient Position: Sitting, Cuff Size: Adult Regular)   Pulse 52                         Results for LUANN HART (MRN [id number]) as of 11/15/2019 08:35   Ref. Range 10/24/2019 15:33   Sodium Latest Ref Range: 133 - 144 mmol/L 137   Potassium Latest Ref Range: 3.4 - 5.3 mmol/L 4.7   Chloride Latest Ref Range: 94 - 109 mmol/L 104   Carbon Dioxide Latest Ref Range: 20 - 32 mmol/L 26   Urea Nitrogen Latest Ref Range: 7 - 30 mg/dL 15   Creatinine Latest Ref Range: 0.52 - 1.04 mg/dL 0.79   GFR Estimate Latest Ref Range: >60 mL/min/1.73_m2 >90   GFR Estimate If Black Latest Ref Range: >60 mL/min/1.73_m2  >90   Calcium Latest Ref Range: 8.5 - 10.1 mg/dL 8.9   Anion Gap Latest Ref Range: 3 - 14 mmol/L 7   Magnesium Latest Ref Range: 1.6 - 2.3 mg/dL 2.2   Phosphorus Latest Ref Range: 2.5 - 4.5 mg/dL 3.7   Albumin Latest Ref Range: 3.4 - 5.0 g/dL 3.8   Protein Total Latest Ref Range: 6.8 - 8.8 g/dL 7.2   Bilirubin Total Latest Ref Range: 0.2 - 1.3 mg/dL 0.5   Alkaline Phosphatase Latest Ref Range: 40 - 150 U/L 72   ALT Latest Ref Range: 0 - 50 U/L 35   AST Latest Ref Range: 0 - 45 U/L 33   Bilirubin Direct Latest Ref Range: 0.0 - 0.2 mg/dL 0.1   Glucose Latest Ref Range: 70 - 99 mg/dL 78   WBC Latest Ref Range: 4.0 - 11.0 10e9/L 5.2   Hemoglobin Latest Ref Range: 11.7 - 15.7 g/dL 13.9   Hematocrit Latest Ref Range: 35.0 - 47.0 % 42.2   Platelet Count Latest Ref Range: 150 - 450 10e9/L 236   RBC Count Latest Ref Range: 3.8 - 5.2 10e12/L 4.33   MCV Latest Ref Range: 78 - 100 fl 98   MCH Latest Ref Range: 26.5 - 33.0 pg 32.1   MCHC Latest Ref Range: 31.5 - 36.5 g/dL 32.9   RDW Latest Ref Range: 10.0 - 15.0 % 12.2   Diff Method Unknown Automated Method   % Neutrophils Latest Units: % 61.5   % Lymphocytes Latest Units: % 28.2   % Monocytes Latest Units: % 8.3   % Eosinophils Latest Units: % 1.0   % Basophils Latest Units: % 0.8   % Immature Granulocytes Latest Units: % 0.2   Nucleated RBCs Latest Ref Range: 0 /100 0   Absolute Neutrophil Latest Ref Range: 1.6 - 8.3 10e9/L 3.2   Absolute Lymphocytes Latest Ref Range: 0.8 - 5.3 10e9/L 1.5   Absolute Monocytes Latest Ref Range: 0.0 - 1.3 10e9/L 0.4   Absolute Eosinophils Latest Ref Range: 0.0 - 0.7 10e9/L 0.1   Absolute Basophils Latest Ref Range: 0.0 - 0.2 10e9/L 0.0   Abs Immature Granulocytes Latest Ref Range: 0 - 0.4 10e9/L 0.0   Absolute Nucleated RBC Unknown 0.0       DXA 2017 lowest Z-score = -0.9        A:        P:     DXA  I will review those and determine if new labs are needed.    RTC in [facility]

## 2019-12-26 ENCOUNTER — ANCILLARY PROCEDURE (OUTPATIENT)
Dept: BONE DENSITY | Facility: CLINIC | Age: 34
End: 2019-12-26
Attending: FAMILY MEDICINE
Payer: COMMERCIAL

## 2019-12-26 DIAGNOSIS — F50.00 ANOREXIA NERVOSA (H): ICD-10-CM

## 2019-12-26 DIAGNOSIS — F50.9 EATING DISORDER: ICD-10-CM

## 2019-12-26 DIAGNOSIS — F50.89 OTHER DISORDER OF EATING: ICD-10-CM

## 2019-12-26 LAB
ALBUMIN SERPL-MCNC: 3.9 G/DL (ref 3.4–5)
ALP SERPL-CCNC: 63 U/L (ref 40–150)
ALT SERPL W P-5'-P-CCNC: 30 U/L (ref 0–50)
ANION GAP SERPL CALCULATED.3IONS-SCNC: 3 MMOL/L (ref 3–14)
AST SERPL W P-5'-P-CCNC: 33 U/L (ref 0–45)
BASOPHILS # BLD AUTO: 0 10E9/L (ref 0–0.2)
BASOPHILS NFR BLD AUTO: 0.7 %
BILIRUB DIRECT SERPL-MCNC: 0.2 MG/DL (ref 0–0.2)
BILIRUB SERPL-MCNC: 0.7 MG/DL (ref 0.2–1.3)
BUN SERPL-MCNC: 20 MG/DL (ref 7–30)
CALCIUM SERPL-MCNC: 9.1 MG/DL (ref 8.5–10.1)
CHLORIDE SERPL-SCNC: 106 MMOL/L (ref 94–109)
CO2 SERPL-SCNC: 27 MMOL/L (ref 20–32)
CREAT SERPL-MCNC: 0.75 MG/DL (ref 0.52–1.04)
DEPRECATED CALCIDIOL+CALCIFEROL SERPL-MC: 44 UG/L (ref 20–75)
DIFFERENTIAL METHOD BLD: NORMAL
EOSINOPHIL # BLD AUTO: 0.1 10E9/L (ref 0–0.7)
EOSINOPHIL NFR BLD AUTO: 0.9 %
ERYTHROCYTE [DISTWIDTH] IN BLOOD BY AUTOMATED COUNT: 13.2 % (ref 10–15)
FERRITIN SERPL-MCNC: 22 NG/ML (ref 12–150)
GFR SERPL CREATININE-BSD FRML MDRD: >90 ML/MIN/{1.73_M2}
GLUCOSE SERPL-MCNC: 81 MG/DL (ref 70–99)
HCT VFR BLD AUTO: 42.4 % (ref 35–47)
HGB BLD-MCNC: 14.1 G/DL (ref 11.7–15.7)
IMM GRANULOCYTES # BLD: 0 10E9/L (ref 0–0.4)
IMM GRANULOCYTES NFR BLD: 0.2 %
LYMPHOCYTES # BLD AUTO: 1.5 10E9/L (ref 0.8–5.3)
LYMPHOCYTES NFR BLD AUTO: 28.6 %
MAGNESIUM SERPL-MCNC: 2.2 MG/DL (ref 1.6–2.3)
MCH RBC QN AUTO: 30.8 PG (ref 26.5–33)
MCHC RBC AUTO-ENTMCNC: 33.3 G/DL (ref 31.5–36.5)
MCV RBC AUTO: 93 FL (ref 78–100)
MONOCYTES # BLD AUTO: 0.4 10E9/L (ref 0–1.3)
MONOCYTES NFR BLD AUTO: 7.4 %
NEUTROPHILS # BLD AUTO: 3.3 10E9/L (ref 1.6–8.3)
NEUTROPHILS NFR BLD AUTO: 62.2 %
NRBC # BLD AUTO: 0 10*3/UL
NRBC BLD AUTO-RTO: 0 /100
PHOSPHATE SERPL-MCNC: 2.8 MG/DL (ref 2.5–4.5)
PLATELET # BLD AUTO: 213 10E9/L (ref 150–450)
POTASSIUM SERPL-SCNC: 4.4 MMOL/L (ref 3.4–5.3)
PROT SERPL-MCNC: 7.3 G/DL (ref 6.8–8.8)
RBC # BLD AUTO: 4.58 10E12/L (ref 3.8–5.2)
SODIUM SERPL-SCNC: 136 MMOL/L (ref 133–144)
WBC # BLD AUTO: 5.4 10E9/L (ref 4–11)

## 2020-02-05 ENCOUNTER — OFFICE VISIT (OUTPATIENT)
Dept: ORTHOPEDICS | Facility: CLINIC | Age: 35
End: 2020-02-05
Payer: COMMERCIAL

## 2020-02-05 VITALS — WEIGHT: 126.9 LBS | BODY MASS INDEX: 21.66 KG/M2 | HEIGHT: 64 IN

## 2020-02-05 DIAGNOSIS — R00.1 BRADYCARDIA: Primary | ICD-10-CM

## 2020-02-05 ASSESSMENT — MIFFLIN-ST. JEOR: SCORE: 1255.61

## 2020-02-05 NOTE — LETTER
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  Her care team is concerned about her level of running as she is training for a marathon again.  They think she needs to return to the [facility].     -She reports having very low heart rates again. No CP or palpitations.        Current Outpatient Medications   Medication     hydrOXYzine (ATARAX) 25 MG tablet     Acetaminophen (TYLENOL PO)     ferrous sulfate (IRON) 325 (65 Fe) MG tablet     fish oil-omega-3 fatty acids 1000 MG capsule     magnesium oxide (MAG-OX) 400 (240 Mg) MG tablet     multivitamin, therapeutic (THERA-VIT) TABS tablet     phosphorus tablet 250 mg (PHOSPHA 250 NEUTRAL) 250 MG per tablet     No current facility-administered medications for this visit.        O: NAD  More anxious  Ht 1.626 m (5' 4\")   Wt 57.6 kg (126 lb 14.4 oz)   BMI 21.78 kg/m       HEENT; neg  Neck; no LAD or thyromegaly  Heart; slow rate 48, regular rhythm  Abd: [facility]   [address] [place] [address]  Phone: [phone]   Fax: [phone]        Patient name:                          Maria De Jesus Hess  Patient demographics:            34 year old white Female   History:                                    Evaluation of Bone Density and eating disorder  Current treatments:                 No Medications  Scan:                                       [employer]  Comparison: 8/23/2017     Conclusions:  The most negative and valid Z-score of -0.8 at the level of the left femoral neck and right femoral neck is WITHIN normal range according to WHO criteria for premenopausal females and men age less than 50.     Low bone density is not the only risk factor for fracture; consider factors such as patient's age, fall risk, injury risk, previous osteoporotic " fracture, family history of osteoporosis, etc.  People with an elevated risk of fracture should be regularly evaluated for low bone mineral density.  For patients eligible for Medicare, routine testing is allowed once every 2 years. Testing frequency can be increased for patients on corticosteroids. Clinical correlation is recommended.      Comparison Exams:  Taking into account the precision errors (ref #3 below) for this center:  These calculated changes in BMD to the previous 2017 exam are significantly decreased at the level of the lumbar spine, left total hip and right total hip.(-5.1%; -6.0%; -4.8%)     Please refer to BMD values in PACS.     Bone densitometry cannot rule out secondary causes of bone loss. Therefore, further metabolic testing to look for secondary causes of low BMD should be performed if indicated.      Clinical correlation is strongly recommended.     Recommend repeat DXA in 2 or more years.     Feel free to contact DXA services if you have any questions or comments.  Thank you for the opportunity to be of service to you and your patient.        Principal result :  Sarah Best MD,[facility]  Division of Endocrinology and Diabetes    Department of Medicine     Technical comments:   Satisfactory.      References:  Ref. 1. WHO categories:          T-score > -1.0  = normal             .                                T-score -1.0 to -2.5 = low bone density  T-score < -2.5 = osteoporosis        .     Ref. 2. 2015 ISCD official position statements:  www.iscd.org.     Ref. 3.  (LSC = least significant change)  AP spine =  0.032 g/cm2  (6.26.2007)  Left hip = 0.029 g/cm2  (6.15.2007)  Right hip = 0.018 g/cm2  (6.15.2007)  Left mid radius = 0.043 g/cm2  (6.26.2007)  Lateral spine region = pending  Total body = pending  According to the ISCD position statements, total hip rather than femoral neck regions are to be compared because larger areas give better precision.     Ref. 4.  By  definition, osteoporosis may be diagnosed in the presence or with the history of a low trauma or fragility fracture.  Fragility and low trauma fracture is defined as a fracture resulting from the force of a fall from a standing height or less or a bone that breaksResults for LUANN HART (MRN [id number]) as of 2/17/2020 07:39   Ref. Range 12/26/2019 11:40   Sodium Latest Ref Range: 133 - 144 mmol/L 136   Potassium Latest Ref Range: 3.4 - 5.3 mmol/L 4.4   Chloride Latest Ref Range: 94 - 109 mmol/L 106   Carbon Dioxide Latest Ref Range: 20 - 32 mmol/L 27   Urea Nitrogen Latest Ref Range: 7 - 30 mg/dL 20   Creatinine Latest Ref Range: 0.52 - 1.04 mg/dL 0.75   GFR Estimate Latest Ref Range: >60 mL/min/1.73_m2 >90   GFR Estimate If Black Latest Ref Range: >60 mL/min/1.73_m2 >90   Calcium Latest Ref Range: 8.5 - 10.1 mg/dL 9.1   Anion Gap Latest Ref Range: 3 - 14 mmol/L 3   Magnesium Latest Ref Range: 1.6 - 2.3 mg/dL 2.2   Phosphorus Latest Ref Range: 2.5 - 4.5 mg/dL 2.8   Albumin Latest Ref Range: 3.4 - 5.0 g/dL 3.9   Protein Total Latest Ref Range: 6.8 - 8.8 g/dL 7.3   Bilirubin Total Latest Ref Range: 0.2 - 1.3 mg/dL 0.7   Alkaline Phosphatase Latest Ref Range: 40 - 150 U/L 63   ALT Latest Ref Range: 0 - 50 U/L 30   AST Latest Ref Range: 0 - 45 U/L 33   Bilirubin Direct Latest Ref Range: 0.0 - 0.2 mg/dL 0.2   Ferritin Latest Ref Range: 12 - 150 ng/mL 22   Vitamin D Deficiency screening Latest Ref Range: 20 - 75 ug/L 44   Glucose Latest Ref Range: 70 - 99 mg/dL 81   WBC Latest Ref Range: 4.0 - 11.0 10e9/L 5.4   Hemoglobin Latest Ref Range: 11.7 - 15.7 g/dL 14.1   Hematocrit Latest Ref Range: 35.0 - 47.0 % 42.4   Platelet Count Latest Ref Range: 150 - 450 10e9/L 213   RBC Count Latest Ref Range: 3.8 - 5.2 10e12/L 4.58   MCV Latest Ref Range: 78 - 100 fl 93   MCH Latest Ref Range: 26.5 - 33.0 pg 30.8   MCHC Latest Ref Range: 31.5 - 36.5 g/dL 33.3   RDW Latest Ref Range: 10.0 - 15.0 % 13.2   Diff Method Unknown  Automated Method   % Neutrophils Latest Units: % 62.2   % Lymphocytes Latest Units: % 28.6   % Monocytes Latest Units: % 7.4   % Eosinophils Latest Units: % 0.9   % Basophils Latest Units: % 0.7   % Immature Granulocytes Latest Units: % 0.2   Nucleated RBCs Latest Ref Range: 0 /100 0   Absolute Neutrophil Latest Ref Range: 1.6 - 8.3 10e9/L 3.3   Absolute Lymphocytes Latest Ref Range: 0.8 - 5.3 10e9/L 1.5   Absolute Monocytes Latest Ref Range: 0.0 - 1.3 10e9/L 0.4   Absolute Eosinophils Latest Ref Range: 0.0 - 0.7 10e9/L 0.1   Absolute Basophils Latest Ref Range: 0.0 - 0.2 10e9/L 0.0   Abs Immature Granulocytes Latest Ref Range: 0 - 0.4 10e9/L 0.0   Absolute Nucleated RBC Unknown 0.0    under conditions that would not cause a normal bone to break.       Ref. 5. NO Physician's Guideline Website address:  www.nof.org.            A: Anorexia Nervosa in a runner: improved with intensive treatment but now regressing   H/o amenorrhea    P:  I congratulated on her on her success and improvements.  She and I are concerned about her regressing.  We discussed the role of exercise at length.   I have ordered a repeat DXA so that she has a current one (last one in 207).  She will return to see me in Dec to review the DXA and consider any additional lab work-up that is needed at that time.     We discussed calcium and Vit D intake as well.     > 25 min of total time spent in one-on-one evalution and discussion with patient regarding nature of problem, course, prior treatments, and therapeutic options,> 50% of which was spent in counseling and coordination of care:      Ludy Betts MD, CAQ, FACSM, CCD  [facility]  Sports Medicine and Bone Health  Team Physician;  Athletics                          Current Outpatient Medications   Medication     hydrOXYzine (ATARAX) 25 MG tablet     Acetaminophen (TYLENOL PO)     ferrous sulfate (IRON) 325 (65 Fe) MG tablet     fish oil-omega-3 fatty acids 1000 MG capsule      "magnesium oxide (MAG-OX) 400 (240 Mg) MG tablet     multivitamin, therapeutic (THERA-VIT) TABS tablet     phosphorus tablet 250 mg (PHOSPHA 250 NEUTRAL) 250 MG per tablet     No current facility-administered medications for this visit.        O: NAD  Ht 1.626 m (5' 4\")   Wt 57.6 kg (126 lb 14.4 oz)   BMI 21.78 kg/m                           Results for LUANN HART (MRN [id number]) as of 11/15/2019 08:35   Ref. Range 10/24/2019 15:33   Sodium Latest Ref Range: 133 - 144 mmol/L 137   Potassium Latest Ref Range: 3.4 - 5.3 mmol/L 4.7   Chloride Latest Ref Range: 94 - 109 mmol/L 104   Carbon Dioxide Latest Ref Range: 20 - 32 mmol/L 26   Urea Nitrogen Latest Ref Range: 7 - 30 mg/dL 15   Creatinine Latest Ref Range: 0.52 - 1.04 mg/dL 0.79   GFR Estimate Latest Ref Range: >60 mL/min/1.73_m2 >90   GFR Estimate If Black Latest Ref Range: >60 mL/min/1.73_m2 >90   Calcium Latest Ref Range: 8.5 - 10.1 mg/dL 8.9   Anion Gap Latest Ref Range: 3 - 14 mmol/L 7   Magnesium Latest Ref Range: 1.6 - 2.3 mg/dL 2.2   Phosphorus Latest Ref Range: 2.5 - 4.5 mg/dL 3.7   Albumin Latest Ref Range: 3.4 - 5.0 g/dL 3.8   Protein Total Latest Ref Range: 6.8 - 8.8 g/dL 7.2   Bilirubin Total Latest Ref Range: 0.2 - 1.3 mg/dL 0.5   Alkaline Phosphatase Latest Ref Range: 40 - 150 U/L 72   ALT Latest Ref Range: 0 - 50 U/L 35   AST Latest Ref Range: 0 - 45 U/L 33   Bilirubin Direct Latest Ref Range: 0.0 - 0.2 mg/dL 0.1   Glucose Latest Ref Range: 70 - 99 mg/dL 78   WBC Latest Ref Range: 4.0 - 11.0 10e9/L 5.2   Hemoglobin Latest Ref Range: 11.7 - 15.7 g/dL 13.9   Hematocrit Latest Ref Range: 35.0 - 47.0 % 42.2   Platelet Count Latest Ref Range: 150 - 450 10e9/L 236   RBC Count Latest Ref Range: 3.8 - 5.2 10e12/L 4.33   MCV Latest Ref Range: 78 - 100 fl 98   MCH Latest Ref Range: 26.5 - 33.0 pg 32.1   MCHC Latest Ref Range: 31.5 - 36.5 g/dL 32.9   RDW Latest Ref Range: 10.0 - 15.0 % 12.2   Diff Method Unknown Automated Method   % Neutrophils Latest " Units: % 61.5   % Lymphocytes Latest Units: % 28.2   % Monocytes Latest Units: % 8.3   % Eosinophils Latest Units: % 1.0   % Basophils Latest Units: % 0.8   % Immature Granulocytes Latest Units: % 0.2   Nucleated RBCs Latest Ref Range: 0 /100 0   Absolute Neutrophil Latest Ref Range: 1.6 - 8.3 10e9/L 3.2   Absolute Lymphocytes Latest Ref Range: 0.8 - 5.3 10e9/L 1.5   Absolute Monocytes Latest Ref Range: 0.0 - 1.3 10e9/L 0.4   Absolute Eosinophils Latest Ref Range: 0.0 - 0.7 10e9/L 0.1   Absolute Basophils Latest Ref Range: 0.0 - 0.2 10e9/L 0.0   Abs Immature Granulocytes Latest Ref Range: 0 - 0.4 10e9/L 0.0   Absolute Nucleated RBC Unknown 0.0       DXA 2017 lowest Z-score = -0.9        A: AN  Bradycardia  Normal BMD but has lost up to 6% since 2017.  This is concerning for future bone health.   NL LFTs    P:  Echo  Zio patch  Counseling on AN treatment. Discussed that it is not a smart choice to train for a marathon at this time.   RTC in 3 weeks  Will discuss her care with Mary Song, Dietician. Likely needs return to [facility] for more intensive treatment.     > 25 min of total time spent in one-on-one evalution and discussion with patient regarding nature of problem, course, prior treatments, and therapeutic options,> 50% of which was spent in counseling and coordination of care:      Ludy Betts MD, CAQ, FACSM, CCD  [facility]  Sports Medicine and Bone Health  Team Physician;  Athletics      Ludy Betts MD

## 2020-02-05 NOTE — PROGRESS NOTES
"S:  33 yo female runner with a long h/o anorexia nervosa is here today for follow-up.  Her care team is concerned about her level of running as she is training for a marathon again.  They think she needs to return to the [facility].     -She reports having very low heart rates again. No CP or palpitations.        Current Outpatient Medications   Medication     hydrOXYzine (ATARAX) 25 MG tablet     Acetaminophen (TYLENOL PO)     ferrous sulfate (IRON) 325 (65 Fe) MG tablet     fish oil-omega-3 fatty acids 1000 MG capsule     magnesium oxide (MAG-OX) 400 (240 Mg) MG tablet     multivitamin, therapeutic (THERA-VIT) TABS tablet     phosphorus tablet 250 mg (PHOSPHA 250 NEUTRAL) 250 MG per tablet     No current facility-administered medications for this visit.        O: NAD  More anxious  Ht 1.626 m (5' 4\")   Wt 57.6 kg (126 lb 14.4 oz)   BMI 21.78 kg/m      HEENT; neg  Neck; no LAD or thyromegaly  Heart; slow rate 48, regular rhythm  Abd: benign    [facility]   [address] [place] [address]  Phone: [phone]   Fax: [phone]        Patient name:                          Maria De Jesus Hess  Patient demographics:            34 year old white Female   History:                                    Evaluation of Bone Density and eating disorder  Current treatments:                 No Medications  Scan:                                       Qardio  Comparison: 8/23/2017     Conclusions:  The most negative and valid Z-score of -0.8 at the level of the left femoral neck and right femoral neck is WITHIN normal range according to WHO criteria for premenopausal females and men age less than 50.     Low bone density is not the only risk factor for fracture; consider factors such as patient's age, fall risk, injury risk, previous osteoporotic fracture, family history of osteoporosis, etc.  People with an elevated risk of fracture should " be regularly evaluated for low bone mineral density.  For patients eligible for Medicare, routine testing is allowed once every 2 years. Testing frequency can be increased for patients on corticosteroids. Clinical correlation is recommended.      Comparison Exams:  Taking into account the precision errors (ref #3 below) for this center:  These calculated changes in BMD to the previous 2017 exam are significantly decreased at the level of the lumbar spine, left total hip and right total hip.(-5.1%; -6.0%; -4.8%)     Please refer to BMD values in PACS.     Bone densitometry cannot rule out secondary causes of bone loss. Therefore, further metabolic testing to look for secondary causes of low BMD should be performed if indicated.      Clinical correlation is strongly recommended.     Recommend repeat DXA in 2 or more years.     Feel free to contact DXA services if you have any questions or comments.  Thank you for the opportunity to be of service to you and your patient.        Principal result :  Sarah Best MD,[facility]  Division of Endocrinology and Diabetes    Department of Medicine     Technical comments:   Satisfactory.      References:  Ref. 1. WHO categories:          T-score > -1.0  = normal             .                                T-score -1.0 to -2.5 = low bone density  T-score < -2.5 = osteoporosis        .     Ref. 2. 2015 ISCD official position statements:  www.iscd.org.     Ref. 3.  (LSC = least significant change)  AP spine =  0.032 g/cm2  (6.26.2007)  Left hip = 0.029 g/cm2  (6.15.2007)  Right hip = 0.018 g/cm2  (6.15.2007)  Left mid radius = 0.043 g/cm2  (6.26.2007)  Lateral spine region = pending  Total body = pending  According to the ISCD position statements, total hip rather than femoral neck regions are to be compared because larger areas give better precision.     Ref. 4.  By definition, osteoporosis may be diagnosed in the presence or with the history of a low trauma or  fragility fracture.  Fragility and low trauma fracture is defined as a fracture resulting from the force of a fall from a standing height or less or a bone that breaksResults for LUANN HART (MRN [id number]) as of 2/17/2020 07:39   Ref. Range 12/26/2019 11:40   Sodium Latest Ref Range: 133 - 144 mmol/L 136   Potassium Latest Ref Range: 3.4 - 5.3 mmol/L 4.4   Chloride Latest Ref Range: 94 - 109 mmol/L 106   Carbon Dioxide Latest Ref Range: 20 - 32 mmol/L 27   Urea Nitrogen Latest Ref Range: 7 - 30 mg/dL 20   Creatinine Latest Ref Range: 0.52 - 1.04 mg/dL 0.75   GFR Estimate Latest Ref Range: >60 mL/min/1.73_m2 >90   GFR Estimate If Black Latest Ref Range: >60 mL/min/1.73_m2 >90   Calcium Latest Ref Range: 8.5 - 10.1 mg/dL 9.1   Anion Gap Latest Ref Range: 3 - 14 mmol/L 3   Magnesium Latest Ref Range: 1.6 - 2.3 mg/dL 2.2   Phosphorus Latest Ref Range: 2.5 - 4.5 mg/dL 2.8   Albumin Latest Ref Range: 3.4 - 5.0 g/dL 3.9   Protein Total Latest Ref Range: 6.8 - 8.8 g/dL 7.3   Bilirubin Total Latest Ref Range: 0.2 - 1.3 mg/dL 0.7   Alkaline Phosphatase Latest Ref Range: 40 - 150 U/L 63   ALT Latest Ref Range: 0 - 50 U/L 30   AST Latest Ref Range: 0 - 45 U/L 33   Bilirubin Direct Latest Ref Range: 0.0 - 0.2 mg/dL 0.2   Ferritin Latest Ref Range: 12 - 150 ng/mL 22   Vitamin D Deficiency screening Latest Ref Range: 20 - 75 ug/L 44   Glucose Latest Ref Range: 70 - 99 mg/dL 81   WBC Latest Ref Range: 4.0 - 11.0 10e9/L 5.4   Hemoglobin Latest Ref Range: 11.7 - 15.7 g/dL 14.1   Hematocrit Latest Ref Range: 35.0 - 47.0 % 42.4   Platelet Count Latest Ref Range: 150 - 450 10e9/L 213   RBC Count Latest Ref Range: 3.8 - 5.2 10e12/L 4.58   MCV Latest Ref Range: 78 - 100 fl 93   MCH Latest Ref Range: 26.5 - 33.0 pg 30.8   MCHC Latest Ref Range: 31.5 - 36.5 g/dL 33.3   RDW Latest Ref Range: 10.0 - 15.0 % 13.2   Diff Method Unknown Automated Method   % Neutrophils Latest Units: % 62.2   % Lymphocytes Latest Units: % 28.6   % Monocytes  Latest Units: % 7.4   % Eosinophils Latest Units: % 0.9   % Basophils Latest Units: % 0.7   % Immature Granulocytes Latest Units: % 0.2   Nucleated RBCs Latest Ref Range: 0 /100 0   Absolute Neutrophil Latest Ref Range: 1.6 - 8.3 10e9/L 3.3   Absolute Lymphocytes Latest Ref Range: 0.8 - 5.3 10e9/L 1.5   Absolute Monocytes Latest Ref Range: 0.0 - 1.3 10e9/L 0.4   Absolute Eosinophils Latest Ref Range: 0.0 - 0.7 10e9/L 0.1   Absolute Basophils Latest Ref Range: 0.0 - 0.2 10e9/L 0.0   Abs Immature Granulocytes Latest Ref Range: 0 - 0.4 10e9/L 0.0   Absolute Nucleated RBC Unknown 0.0    under conditions that would not cause a normal bone to break.       Ref. 5. NO Physician's Guideline Website address:  www.nof.org.            A: Anorexia Nervosa in a runner: improved with intensive treatment but now regressing   H/o amenorrhea    P:  I congratulated on her on her success and improvements.  She and I are concerned about her regressing.  We discussed the role of exercise at length.   I have ordered a repeat DXA so that she has a current one (last one in 207).  She will return to see me in Dec to review the DXA and consider any additional lab work-up that is needed at that time.     We discussed calcium and Vit D intake as well.     > 25 min of total time spent in one-on-one evalution and discussion with patient regarding nature of problem, course, prior treatments, and therapeutic options,> 50% of which was spent in counseling and coordination of care:      Ludy Betts MD, CAQ, FACSM, CCD  [facility]  Sports Medicine and Bone Health  Team Physician;  Athletics                          Current Outpatient Medications   Medication     hydrOXYzine (ATARAX) 25 MG tablet     Acetaminophen (TYLENOL PO)     ferrous sulfate (IRON) 325 (65 Fe) MG tablet     fish oil-omega-3 fatty acids 1000 MG capsule     magnesium oxide (MAG-OX) 400 (240 Mg) MG tablet     multivitamin, therapeutic (THERA-VIT) TABS tablet      "phosphorus tablet 250 mg (PHOSPHA 250 NEUTRAL) 250 MG per tablet     No current facility-administered medications for this visit.        O: NAD  Ht 1.626 m (5' 4\")   Wt 57.6 kg (126 lb 14.4 oz)   BMI 21.78 kg/m                          Results for LUANN HART (MRN [id number]) as of 11/15/2019 08:35   Ref. Range 10/24/2019 15:33   Sodium Latest Ref Range: 133 - 144 mmol/L 137   Potassium Latest Ref Range: 3.4 - 5.3 mmol/L 4.7   Chloride Latest Ref Range: 94 - 109 mmol/L 104   Carbon Dioxide Latest Ref Range: 20 - 32 mmol/L 26   Urea Nitrogen Latest Ref Range: 7 - 30 mg/dL 15   Creatinine Latest Ref Range: 0.52 - 1.04 mg/dL 0.79   GFR Estimate Latest Ref Range: >60 mL/min/1.73_m2 >90   GFR Estimate If Black Latest Ref Range: >60 mL/min/1.73_m2 >90   Calcium Latest Ref Range: 8.5 - 10.1 mg/dL 8.9   Anion Gap Latest Ref Range: 3 - 14 mmol/L 7   Magnesium Latest Ref Range: 1.6 - 2.3 mg/dL 2.2   Phosphorus Latest Ref Range: 2.5 - 4.5 mg/dL 3.7   Albumin Latest Ref Range: 3.4 - 5.0 g/dL 3.8   Protein Total Latest Ref Range: 6.8 - 8.8 g/dL 7.2   Bilirubin Total Latest Ref Range: 0.2 - 1.3 mg/dL 0.5   Alkaline Phosphatase Latest Ref Range: 40 - 150 U/L 72   ALT Latest Ref Range: 0 - 50 U/L 35   AST Latest Ref Range: 0 - 45 U/L 33   Bilirubin Direct Latest Ref Range: 0.0 - 0.2 mg/dL 0.1   Glucose Latest Ref Range: 70 - 99 mg/dL 78   WBC Latest Ref Range: 4.0 - 11.0 10e9/L 5.2   Hemoglobin Latest Ref Range: 11.7 - 15.7 g/dL 13.9   Hematocrit Latest Ref Range: 35.0 - 47.0 % 42.2   Platelet Count Latest Ref Range: 150 - 450 10e9/L 236   RBC Count Latest Ref Range: 3.8 - 5.2 10e12/L 4.33   MCV Latest Ref Range: 78 - 100 fl 98   MCH Latest Ref Range: 26.5 - 33.0 pg 32.1   MCHC Latest Ref Range: 31.5 - 36.5 g/dL 32.9   RDW Latest Ref Range: 10.0 - 15.0 % 12.2   Diff Method Unknown Automated Method   % Neutrophils Latest Units: % 61.5   % Lymphocytes Latest Units: % 28.2   % Monocytes Latest Units: % 8.3   % Eosinophils " Latest Units: % 1.0   % Basophils Latest Units: % 0.8   % Immature Granulocytes Latest Units: % 0.2   Nucleated RBCs Latest Ref Range: 0 /100 0   Absolute Neutrophil Latest Ref Range: 1.6 - 8.3 10e9/L 3.2   Absolute Lymphocytes Latest Ref Range: 0.8 - 5.3 10e9/L 1.5   Absolute Monocytes Latest Ref Range: 0.0 - 1.3 10e9/L 0.4   Absolute Eosinophils Latest Ref Range: 0.0 - 0.7 10e9/L 0.1   Absolute Basophils Latest Ref Range: 0.0 - 0.2 10e9/L 0.0   Abs Immature Granulocytes Latest Ref Range: 0 - 0.4 10e9/L 0.0   Absolute Nucleated RBC Unknown 0.0       DXA 2017 lowest Z-score = -0.9        A: AN  Bradycardia  Normal BMD but has lost up to 6% since 2017.  This is concerning for future bone health.   NL LFTs    P:  Echo  Zio patch  Counseling on AN treatment. Discussed that it is not a smart choice to train for a marathon at this time.   RTC in 3 weeks  Will discuss her care with Mary Song, Dietician. Likely needs return to [facility] for more intensive treatment.     > 25 min of total time spent in one-on-one evalution and discussion with patient regarding nature of problem, course, prior treatments, and therapeutic options,> 50% of which was spent in counseling and coordination of care:      Ludy Betts MD, CAQ, FACSM, CCD  [facility]  Sports Medicine and Bone Health  Team Physician;  Athletics

## 2020-02-21 ENCOUNTER — ANCILLARY PROCEDURE (OUTPATIENT)
Dept: CARDIOLOGY | Facility: CLINIC | Age: 35
End: 2020-02-21
Attending: FAMILY MEDICINE
Payer: COMMERCIAL

## 2020-02-21 DIAGNOSIS — F50.89 OTHER DISORDER OF EATING: ICD-10-CM

## 2020-02-21 DIAGNOSIS — R00.1 BRADYCARDIA: ICD-10-CM

## 2020-02-21 DIAGNOSIS — F50.9 EATING DISORDER: ICD-10-CM

## 2020-02-21 LAB
ALBUMIN SERPL-MCNC: 3.8 G/DL (ref 3.4–5)
ALP SERPL-CCNC: 61 U/L (ref 40–150)
ALT SERPL W P-5'-P-CCNC: 46 U/L (ref 0–50)
ANION GAP SERPL CALCULATED.3IONS-SCNC: 5 MMOL/L (ref 3–14)
AST SERPL W P-5'-P-CCNC: 41 U/L (ref 0–45)
BASOPHILS # BLD AUTO: 0 10E9/L (ref 0–0.2)
BASOPHILS NFR BLD AUTO: 0.7 %
BILIRUB DIRECT SERPL-MCNC: 0.1 MG/DL (ref 0–0.2)
BILIRUB SERPL-MCNC: 0.4 MG/DL (ref 0.2–1.3)
BUN SERPL-MCNC: 26 MG/DL (ref 7–30)
CALCIUM SERPL-MCNC: 9.4 MG/DL (ref 8.5–10.1)
CHLORIDE SERPL-SCNC: 109 MMOL/L (ref 94–109)
CO2 SERPL-SCNC: 26 MMOL/L (ref 20–32)
CREAT SERPL-MCNC: 0.78 MG/DL (ref 0.52–1.04)
DIFFERENTIAL METHOD BLD: NORMAL
EOSINOPHIL # BLD AUTO: 0 10E9/L (ref 0–0.7)
EOSINOPHIL NFR BLD AUTO: 0.9 %
ERYTHROCYTE [DISTWIDTH] IN BLOOD BY AUTOMATED COUNT: 13.1 % (ref 10–15)
GFR SERPL CREATININE-BSD FRML MDRD: >90 ML/MIN/{1.73_M2}
GLUCOSE SERPL-MCNC: 85 MG/DL (ref 70–99)
HCT VFR BLD AUTO: 42.6 % (ref 35–47)
HGB BLD-MCNC: 14 G/DL (ref 11.7–15.7)
IMM GRANULOCYTES # BLD: 0 10E9/L (ref 0–0.4)
IMM GRANULOCYTES NFR BLD: 0.2 %
LYMPHOCYTES # BLD AUTO: 1.2 10E9/L (ref 0.8–5.3)
LYMPHOCYTES NFR BLD AUTO: 27.8 %
MAGNESIUM SERPL-MCNC: 2.4 MG/DL (ref 1.6–2.3)
MCH RBC QN AUTO: 31.3 PG (ref 26.5–33)
MCHC RBC AUTO-ENTMCNC: 32.9 G/DL (ref 31.5–36.5)
MCV RBC AUTO: 95 FL (ref 78–100)
MONOCYTES # BLD AUTO: 0.3 10E9/L (ref 0–1.3)
MONOCYTES NFR BLD AUTO: 7.4 %
NEUTROPHILS # BLD AUTO: 2.7 10E9/L (ref 1.6–8.3)
NEUTROPHILS NFR BLD AUTO: 63 %
NRBC # BLD AUTO: 0 10*3/UL
NRBC BLD AUTO-RTO: 0 /100
PHOSPHATE SERPL-MCNC: 3.3 MG/DL (ref 2.5–4.5)
PLATELET # BLD AUTO: 226 10E9/L (ref 150–450)
POTASSIUM SERPL-SCNC: 4.7 MMOL/L (ref 3.4–5.3)
PROT SERPL-MCNC: 7.1 G/DL (ref 6.8–8.8)
RBC # BLD AUTO: 4.48 10E12/L (ref 3.8–5.2)
SODIUM SERPL-SCNC: 140 MMOL/L (ref 133–144)
WBC # BLD AUTO: 4.3 10E9/L (ref 4–11)

## 2020-02-21 PROCEDURE — 0298T ZIO PATCH HOLTER ADULT PEDIATRIC GREATER THAN 48 HRS: CPT | Mod: ZP | Performed by: INTERNAL MEDICINE

## 2020-02-21 PROCEDURE — 0296T ZIO PATCH HOLTER ADULT PEDIATRIC GREATER THAN 48 HRS: CPT | Mod: ZF

## 2020-02-21 NOTE — PROGRESS NOTES
Per Dr. Betts, patient to have 7 day Zio monitor placed.  Diagnosis: Bradycardia  Monitor placed: Yes  Patient Instructed: Yes  Patient verbalized understanding: Yes  Holter # [id number]  Cristian Cerda

## 2020-03-02 ENCOUNTER — HEALTH MAINTENANCE LETTER (OUTPATIENT)
Age: 35
End: 2020-03-02

## 2020-04-01 ENCOUNTER — TELEPHONE (OUTPATIENT)
Dept: ORTHOPEDICS | Facility: CLINIC | Age: 35
End: 2020-04-01

## 2020-04-01 NOTE — TELEPHONE ENCOUNTER
Called and talked to Maria De Jesus.    Explained that due to COVID-19, [facility] is taking steps to try to limit potential patient exposures by reducing face to face visits.    We have reviewed schedules with Dr. Betts and together feel that their follow-up appointment, currently scheduled on 4/8/2020, could not be done via telephone.    Patient proceeding with cancelling their appointment.    Cancelling Visit  They will call to reschedule: No, explain: Rescheduled  Their appointment was rescheduled for: 5/20/20 at 11:40 am.    All of their questions were answered at this time, they will call with any new questions that may arise.    Reji KRUSE, LAT, ATC

## 2020-05-08 NOTE — TELEPHONE ENCOUNTER
Spoke with patient about doing a video visit. Confirmed her email address [email or web address]. She has a computer/laptop with a webcam and microphone and the appointment logistics were reviewed. She had no further questions.

## 2020-05-20 ENCOUNTER — VIRTUAL VISIT (OUTPATIENT)
Dept: ORTHOPEDICS | Facility: CLINIC | Age: 35
End: 2020-05-20
Payer: COMMERCIAL

## 2020-05-20 DIAGNOSIS — F50.00 ANOREXIA NERVOSA (H): Primary | ICD-10-CM

## 2020-05-20 NOTE — LETTER
"5/20/2020       RE: Maria De Jesus Hess  [address]  [place] [address]     Dear Colleague,    Thank you for referring your patient, Maria De Jesus Hess, to the [facility] at [facility]. Please see a copy of my visit note below.    Maria De Jesus Hess is a 35 year old female who is being evaluated via a billable video visit.      The patient has been notified of following:     \"This video visit will be conducted via a call between you and your physician/provider. We have found that certain health care needs can be provided without the need for an in-person physical exam.  This service lets us provide the care you need with a video conversation.  If a prescription is necessary we can send it directly to your pharmacy.  If lab work is needed we can place an order for that and you can then stop by our lab to have the test done at a later time.    Video visits are billed at different rates depending on your insurance coverage.  Please reach out to your insurance provider with any questions.     If during the course of the call the physician/provider feels a video visit is not appropriate, you will not be charged for this service.\"    Patient has given verbal consent for Video visit? Yes    How would you like to obtain your AVS? MyChart    Patient would like the video invitation sent by: Send to e-mail at: [email or web address]     Will anyone else be joining your video visit? No        Video-Visit Details    Type of service:  Video Visit    Video Time: 45 minutes    Originating Location (pt. Location): home    Distant Location (provider location):  [facility]     Platform used for Video Visit: Kaushik ALEJANDRO: Maria De Jesus is a 34 yo female with anorexia nervosa who presents for a video visit.  She wouldn't follow the recommendations of her Dietician and Therapist regarding pursuing an appt with me and evaluation of her labs and the LE edema occurring after running " 16 miles or more despite multiple attempts to get her to engage including a video appt with myself and them and Maria De Jesus explaining the concerns behind this recommendation.  I had offered to see Maria De Jesus as a special appt in-person a few weeks ago during a week when I was only seeing patients virtually.  Maria De Jesus did not follow thru with letting me know if she would come in or not after considering it further.  They have formally discharged her from their care.  Maria De Jesus feels abandoned and this makes her worried because she realizes that she needs professional support and won't improve without it.  She did call [facility] and find out if they would accept her back into the residential treatment program.  Maria De Jesus reports that they are not doing that due to COVID 19.  They are also not doing virtual visits per Maria De Jesus.  Maria De Jesus was previously successful with treatment there and is willing to go back [place].  In the past, Maria De Jesus has also been in treatment at [facility] but wasn't successful there.  She isn't interested in going back there.      Maria De Jesus denies further LE edema since the most she is running at one time now is 14 miles at a time and she wasn't getting that swelling until running 16 or more miles.  Maria De Jesus reports running 70 miles per week but eating less than 1000 calories per day for weeks now although she does report increasing her intake more over the last two weeks. She denies  CP, abd pain, SOB REA at rest or with exercising.      Working from home full time.        O:  NAD  Anxious but less than our virtual meeting arranged thru the [facility] about two weeks ago.              A:  Anorexia Nervosa:  Currently not doing well and now without Nutrition and Therapy support.    Excessive running    Normal but decreasing BMD    Lower LE edema with running 16 miles or greater    Bradycardia:  Combination of AN and running but trending lower per Zio patch but not as bad as when she was in residential treatment at  [facility].     PEARL:  No meds for this.  Needs Psychiatry appt        PLAN:  Maria De Jesus and I have had a lengthy discussion about the difficulty of getting her treatment given her struggles and discharge from care along with the COVID pandemic.  I would like her to do the followin..  In person appt with me at the [facility] to do a physical exam and decide if further cardiac evaluation is needed, check orthostatics and obtain current lab work.    She is very anxious about coming into the [facility] due to COVID 19.  I have offered to have her see a Physician in [place] (Dr. Vicente Ortega) to evaluate her and determine if he thinks a CXR, EKG etc are warranted.  She is more comfortable with this plan as low as she doesn't have to be weighed in.  I will discuss this with Dr. Ortega and see if he is willing to do this.     2.  I will also discuss her case with Dr. Cierra Wynne, Psychologist, who works with athletes and also has an extensive background treating eating disordered patients to see if she is willing to have Maria De Jesus as a patient.  Maria De Jesus is comfortable with me discussing her case with Dr. Wynne.      3.  Will notify Maria De Jesus's Dietician, who has a practice independent of the [facility], Mary Song to see if she would be willing to accept Maria De Jesus back if Maria De Jesus is following thru on the medical evaluation side of the equation to treat her in conjunction with Dr. Wynne and myself.     4.  I gave Maria De Jesus my cell phone and she will text me by tomorrow morning if she changes her mind on any portion of this plan before I reach out to these professionals.        Ludy Betts MD, CAQ, FACSM, CCD  [facility]  Sports Medicine and Bone Health  Team Physician;  Athletics      Again, thank you for allowing me to participate in the care of your patient.      Sincerely,    Ludy Betts MD

## 2020-05-20 NOTE — LETTER
"  5/20/2020      RE: Maria De Jesus Hess  [address]  [facility] [address]       Maria De Jesus Hess is a 35 year old female who is being evaluated via a billable video visit.      The patient has been notified of following:     \"This video visit will be conducted via a call between you and your physician/provider. We have found that certain health care needs can be provided without the need for an in-person physical exam.  This service lets us provide the care you need with a video conversation.  If a prescription is necessary we can send it directly to your pharmacy.  If lab work is needed we can place an order for that and you can then stop by our lab to have the test done at a later time.    Video visits are billed at different rates depending on your insurance coverage.  Please reach out to your insurance provider with any questions.     If during the course of the call the physician/provider feels a video visit is not appropriate, you will not be charged for this service.\"    Patient has given verbal consent for Video visit? Yes    How would you like to obtain your AVS? Toan    Patient would like the video invitation sent by: Send to e-mail at: [email or web address]     Will anyone else be joining your video visit? No        Video-Visit Details    Type of service:  Video Visit    Video Time: 45 minutes    Originating Location (pt. Location): home    Distant Location (provider location):   [facility]     Platform used for Video Visit: Kaushik ALEJANDRO: Maria De Jesus is a 36 yo female with anorexia nervosa who presents for a video visit.  She wouldn't follow the recommendations of her Dietician and Therapist regarding pursuing an appt with me and evaluation of her labs and the LE edema occurring after running 16 miles or more despite multiple attempts to get her to engage including a video appt with myself and them and Maria De Jesus explaining the concerns behind this recommendation.  I had offered to see Maria De Jesus as " a special appt in-person a few weeks ago during a week when I was only seeing patients virtually.  Maria De Jesus did not follow thru with letting me know if she would come in or not after considering it further.  They have formally discharged her from their care.  Maria De Jesus feels abandoned and this makes her worried because she realizes that she needs professional support and won't improve without it.  She did call [facility] and find out if they would accept her back into the residential treatment program.  Maria De Jesus reports that they are not doing that due to COVID 19.  They are also not doing virtual visits per Maria De Jesus.  Maria De Jesus was previously successful with treatment there and is willing to go back [place].  In the past, Maria De Jesus has also been in treatment at [place] but wasn't successful there.  She isn't interested in going back there.      Maria De Jesus denies further LE edema since the most she is running at one time now is 14 miles at a time and she wasn't getting that swelling until running 16 or more miles.  Maria De Jesus reports running 70 miles per week but eating less than 1000 calories per day for weeks now although she does report increasing her intake more over the last two weeks. She denies  CP, abd pain, SOB REA at rest or with exercising.      Working from home full time.        O:  NAD  Anxious but less than our virtual meeting arranged thru the [facility] about two weeks ago.              A:  Anorexia Nervosa:  Currently not doing well and now without Nutrition and Therapy support.    Excessive running    Normal but decreasing BMD    Lower LE edema with running 16 miles or greater    Bradycardia:  Combination of AN and running but trending lower per Zio patch but not as bad as when she was in residential treatment at [facility].     PEARL:  No meds for this.  Needs Psychiatry appt        PLAN:  Maria De Jesus and I have had a lengthy discussion about the difficulty of getting her treatment given her struggles and discharge from  care along with the COVID pandemic.  I would like her to do the followin..  In person appt with me at the [facility] to do a physical exam and decide if further cardiac evaluation is needed, check orthostatics and obtain current lab work.    She is very anxious about coming into the [facility] due to COVID 19.  I have offered to have her see a Physician in [place] (Dr. Vicente Ortega) to evaluate her and determine if he thinks a CXR, EKG etc are warranted.  She is more comfortable with this plan as low as she doesn't have to be weighed in.  I will discuss this with Dr. Ortega and see if he is willing to do this.     2.  I will also discuss her case with Dr. Cierra Wynne, Psychologist, who works with athletes and also has an extensive background treating eating disordered patients to see if she is willing to have Maria De Jesus as a patient.  Maria De Jesus is comfortable with me discussing her case with Dr. Wynne.      3.  Will notify Maria De Jesus's Dietician, who has a practice independent of the [facility], Mary Song to see if she would be willing to accept Maria De Jesus back if Maria De Jesus is following thru on the medical evaluation side of the equation to treat her in conjunction with Dr. Wynne and myself.     4.  I gave Maria De Jesus my cell phone and she will text me by tomorrow morning if she changes her mind on any portion of this plan before I reach out to these professionals.        Ludy Betts MD, CAQ, FACSM, CCD  [facility]  Sports Medicine and Bone Health  Team Physician;  Athletics      Lduy Betts MD

## 2020-06-19 ENCOUNTER — TELEPHONE (OUTPATIENT)
Dept: FAMILY MEDICINE | Facility: CLINIC | Age: 35
End: 2020-06-19

## 2020-06-19 NOTE — TELEPHONE ENCOUNTER
[facility] Call Center    Phone Message    May a detailed message be left on voicemail: yes     Reason for Call: Other: Maria De Jesus calling to schedule an physical appointment with Dr. Martino. Maria De Jesus says that she has medical history forms for her employer that must be filled out for a wellness program. Maria De Jesus said this needed to be done as soon as possible, but the COVID-19 protocols state we are to defer the patient. Maria De Jesus said vitals are needed for these forms, so she didn't think a virtual visit would work for her. Please give Maria De Jesus a call back at your earliest convenience to discuss.     Action Taken: Message routed to:  [facility]: Primary Care    Travel Screening: Not Applicable

## 2020-06-19 NOTE — TELEPHONE ENCOUNTER
Patient needs Biometric Screening for work, no other needs at this time.     Marva Smiley, JIMBO  06/19/20  4:31 PM

## 2020-06-22 ENCOUNTER — TELEPHONE (OUTPATIENT)
Dept: ORTHOPEDICS | Facility: CLINIC | Age: 35
End: 2020-06-22

## 2020-06-22 NOTE — TELEPHONE ENCOUNTER
Called and offered a 2:20 pm appointment with Dr. Betts on Thursday, 6/25 (per Dr. Betts). Patient accepted appointment time. The restrictions were explained and the patient verbalized understanding.

## 2020-06-22 NOTE — TELEPHONE ENCOUNTER
LVM about rescheduling her appointment today or if she wants to end up keeping it. Left call back number.

## 2020-06-25 ENCOUNTER — ANCILLARY PROCEDURE (OUTPATIENT)
Dept: GENERAL RADIOLOGY | Facility: CLINIC | Age: 35
End: 2020-06-25
Attending: FAMILY MEDICINE
Payer: COMMERCIAL

## 2020-06-25 ENCOUNTER — OFFICE VISIT (OUTPATIENT)
Dept: ORTHOPEDICS | Facility: CLINIC | Age: 35
End: 2020-06-25
Payer: COMMERCIAL

## 2020-06-25 VITALS — WEIGHT: 120 LBS | BODY MASS INDEX: 20.49 KG/M2 | HEIGHT: 64 IN | OXYGEN SATURATION: 96 %

## 2020-06-25 DIAGNOSIS — F50.00 ANOREXIA NERVOSA (H): ICD-10-CM

## 2020-06-25 DIAGNOSIS — F50.019 ANOREXIA NERVOSA, RESTRICTING TYPE: ICD-10-CM

## 2020-06-25 DIAGNOSIS — F50.019 ANOREXIA NERVOSA, RESTRICTING TYPE: Primary | ICD-10-CM

## 2020-06-25 LAB
ALBUMIN SERPL-MCNC: 4 G/DL (ref 3.4–5)
ALBUMIN UR-MCNC: NEGATIVE MG/DL
ALP SERPL-CCNC: 63 U/L (ref 40–150)
ALT SERPL W P-5'-P-CCNC: 41 U/L (ref 0–50)
ANION GAP SERPL CALCULATED.3IONS-SCNC: 5 MMOL/L (ref 3–14)
APPEARANCE UR: CLEAR
AST SERPL W P-5'-P-CCNC: 41 U/L (ref 0–45)
BASOPHILS # BLD AUTO: 0 10E9/L (ref 0–0.2)
BASOPHILS NFR BLD AUTO: 0.6 %
BILIRUB SERPL-MCNC: 0.9 MG/DL (ref 0.2–1.3)
BILIRUB UR QL STRIP: NEGATIVE
BUN SERPL-MCNC: 18 MG/DL (ref 7–30)
CALCIUM SERPL-MCNC: 9 MG/DL (ref 8.5–10.1)
CHLORIDE SERPL-SCNC: 106 MMOL/L (ref 94–109)
CO2 SERPL-SCNC: 26 MMOL/L (ref 20–32)
COLOR UR AUTO: ABNORMAL
CREAT SERPL-MCNC: 0.76 MG/DL (ref 0.52–1.04)
DIFFERENTIAL METHOD BLD: NORMAL
EOSINOPHIL # BLD AUTO: 0 10E9/L (ref 0–0.7)
EOSINOPHIL NFR BLD AUTO: 0.3 %
ERYTHROCYTE [DISTWIDTH] IN BLOOD BY AUTOMATED COUNT: 12.7 % (ref 10–15)
GFR SERPL CREATININE-BSD FRML MDRD: >90 ML/MIN/{1.73_M2}
GLUCOSE SERPL-MCNC: 70 MG/DL (ref 70–99)
GLUCOSE UR STRIP-MCNC: NEGATIVE MG/DL
HCG UR QL: NEGATIVE
HCT VFR BLD AUTO: 41.9 % (ref 35–47)
HGB BLD-MCNC: 14.2 G/DL (ref 11.7–15.7)
HGB UR QL STRIP: NEGATIVE
IMM GRANULOCYTES # BLD: 0 10E9/L (ref 0–0.4)
IMM GRANULOCYTES NFR BLD: 0.2 %
KETONES UR STRIP-MCNC: 5 MG/DL
LEUKOCYTE ESTERASE UR QL STRIP: NEGATIVE
LYMPHOCYTES # BLD AUTO: 1.5 10E9/L (ref 0.8–5.3)
LYMPHOCYTES NFR BLD AUTO: 22.7 %
MAGNESIUM SERPL-MCNC: 2.3 MG/DL (ref 1.6–2.3)
MCH RBC QN AUTO: 31.8 PG (ref 26.5–33)
MCHC RBC AUTO-ENTMCNC: 33.9 G/DL (ref 31.5–36.5)
MCV RBC AUTO: 94 FL (ref 78–100)
MONOCYTES # BLD AUTO: 0.5 10E9/L (ref 0–1.3)
MONOCYTES NFR BLD AUTO: 6.9 %
NEUTROPHILS # BLD AUTO: 4.6 10E9/L (ref 1.6–8.3)
NEUTROPHILS NFR BLD AUTO: 69.3 %
NITRATE UR QL: NEGATIVE
NRBC # BLD AUTO: 0 10*3/UL
NRBC BLD AUTO-RTO: 0 /100
PH UR STRIP: 6 PH (ref 5–7)
PHOSPHATE SERPL-MCNC: 3.3 MG/DL (ref 2.5–4.5)
PLATELET # BLD AUTO: 214 10E9/L (ref 150–450)
POTASSIUM SERPL-SCNC: 4.3 MMOL/L (ref 3.4–5.3)
PREALB SERPL IA-MCNC: 24 MG/DL (ref 15–45)
PROT SERPL-MCNC: 7.4 G/DL (ref 6.8–8.8)
RBC # BLD AUTO: 4.47 10E12/L (ref 3.8–5.2)
RBC #/AREA URNS AUTO: 1 /HPF (ref 0–2)
SODIUM SERPL-SCNC: 137 MMOL/L (ref 133–144)
SOURCE: ABNORMAL
SP GR UR STRIP: 1 (ref 1–1.03)
SQUAMOUS #/AREA URNS AUTO: <1 /HPF (ref 0–1)
T3FREE SERPL-MCNC: 2 PG/ML (ref 2.3–4.2)
TSH SERPL DL<=0.005 MIU/L-ACNC: 0.6 MU/L (ref 0.4–4)
UROBILINOGEN UR STRIP-MCNC: 0 MG/DL (ref 0–2)
WBC # BLD AUTO: 6.6 10E9/L (ref 4–11)
WBC #/AREA URNS AUTO: 1 /HPF (ref 0–5)

## 2020-06-25 ASSESSMENT — MIFFLIN-ST. JEOR: SCORE: 1219.32

## 2020-06-25 NOTE — LETTER
"  6/25/2020      RE: Maria De Jesus BUSH Pustovar  [address]  [facility] [address]         S: Maria De Jesus is a 34 yo female with anorexia nervosa who presents for an in-person follow up visit.  She was accepted back by her Dietician and Psychologist after she agreed to follow thru on their recommendations.  They have again recommended that she return to the [facility] in [facility].  She brings me paperwork that needs to be done as part on the intake process.  She reports that she has been eating more but gained 8 lbs over a short period of time.  Some LE swelling that comes and goes but no SOB or CP.   Able to still run 70 miles per week without difficulty.  She notes better energy with increased intake.  Taking in her extra calories via liquids.  Not sure she is \"bad enough\" to back into residential treatment but she is willing to continue to pursue it.  Dizziness with sit to stand: lightheadedness but not vertigo.       Working from home full time.      Current Outpatient Medications   Medication     hydrOXYzine (ATARAX) 25 MG tablet     No current facility-administered medications for this visit.            O:  NAD  Ht 1.626 m (5' 4\")   Wt 54.4 kg (120 lb)   SpO2 96%   BMI 20.60 kg/m       Weight 2/2020: 127 lbs    Anxious     Appears gaunt  Vital Signs:    Orthostatic BP and P:   Supine;  118/66 P 49  Standing taken after 5 min:  /86   P: 67  HEENT: neg  Neck: No lad, no jvd, no thyromegaly  Lungs; cta  Heart; bradycardic, regular, no m/rub, no S3/S4  Abd:  Soft, very thin, nttp, no HSM  Ext;  No c/c/e,     EKG:  Bradycardiac              A:  Anorexia Nervosa:  Currently not doing well but with a recent weight increase out of portion to her caloric intake increase.      Excessive running    Normal but decreasing BMD    Lower LE edema intermittent    Bradycardia:  Combination of AN and running    PEARL:  Vistaril PRN anxiety     PLAN:  Maria De Jesus and I have had a lengthy discussion regarding " her status.  I still agree that she should return to [facility] for [facility] which is now operational again after being restricted due to COVID 19.   -I would like her to have a CXR.  Recent Echo was normal.  LE swelling is concerning but no other evidence of CHF.   -Check labs including those requested by [facility] for the Residential Program Admission.    -Continue to work with Dietician and Psychologist from [facility] until she can admitted to [facility].  Maria De Jesus is worried about the insurance coverage which was difficult to obtain for her previous admission.    -Recommended no running      Her paperwork with be completed and faxed once I have reviewed her lab results.     Addendum:    Labs reviewed and are normal CBC with diff, CMP, mag, phos, Vit D, and urine pregnancy test.  UA normal except + ketones despite a SG of 1.003.      > 25 min of total time spent in one-on-one evalution and discussion with patient regarding nature of problem, course, prior treatments, and therapeutic options,> 50% of which was spent in counseling and coordination of care:      Ludy Betts MD, CAQ, FACSM, CCD  [facility]  Sports Medicine and Bone Health  Team Physician;  Athletics    Ludy Betts MD

## 2020-06-25 NOTE — PROGRESS NOTES
"  S: Maria De Jesus is a 36 yo female with anorexia nervosa who presents for an in-person follow up visit.  She was accepted back by her Dietician and Psychologist after she agreed to follow thru on their recommendations.  They have again recommended that she return to the [facility] in [facility].  She brings me paperwork that needs to be done as part on the intake process.  She reports that she has been eating more but gained 8 lbs over a short period of time.  Some LE swelling that comes and goes but no SOB or CP.   Able to still run 70 miles per week without difficulty.  She notes better energy with increased intake.  Taking in her extra calories via liquids.  Not sure she is \"bad enough\" to back into residential treatment but she is willing to continue to pursue it.  Dizziness with sit to stand: lightheadedness but not vertigo.       Working from home full time.      Current Outpatient Medications   Medication     hydrOXYzine (ATARAX) 25 MG tablet     No current facility-administered medications for this visit.            O:  NAD  Ht 1.626 m (5' 4\")   Wt 54.4 kg (120 lb)   SpO2 96%   BMI 20.60 kg/m       Weight 2/2020: 127 lbs    Anxious     Appears gaunt  Vital Signs:    Orthostatic BP and P:   Supine;  118/66 P 49  Standing taken after 5 min:  /86   P: 67  HEENT: neg  Neck: No lad, no jvd, no thyromegaly  Lungs; cta  Heart; bradycardic, regular, no m/rub, no S3/S4  Abd:  Soft, very thin, nttp, no HSM  Ext;  No c/c/e,     EKG:  Bradycardiac              A:  Anorexia Nervosa:  Currently not doing well but with a recent weight increase out of portion to her caloric intake increase.      Excessive running    Normal but decreasing BMD    Lower LE edema intermittent    Bradycardia:  Combination of AN and running    PEARL:  Vistaril PRN anxiety     PLAN:  Maria De Jesus and I have had a lengthy discussion regarding her status.  I still agree that she should return to [facility] " which is now operational again after being restricted due to COVID 19.   -I would like her to have a CXR.  Recent Echo was normal.  LE swelling is concerning but no other evidence of CHF.   -Check labs including those requested by [facility] for the Residential Program Admission.    -Continue to work with Dietician and Psychologist from [facility] until she can admitted to [facility].  Maria De Jesus is worried about the insurance coverage which was difficult to obtain for her previous admission.    -Recommended no running      Her paperwork with be completed and faxed once I have reviewed her lab results.     Addendum:    Labs reviewed and are normal CBC with diff, CMP, mag, phos, Vit D, and urine pregnancy test.  UA normal except + ketones despite a SG of 1.003.      > 25 min of total time spent in one-on-one evalution and discussion with patient regarding nature of problem, course, prior treatments, and therapeutic options,> 50% of which was spent in counseling and coordination of care:      Ludy Betts MD, CAQ, FACSM, CCD  [facility]  Sports Medicine and Bone Health  Team Physician;  Athletics

## 2020-06-26 LAB
DEPRECATED CALCIDIOL+CALCIFEROL SERPL-MC: 72 UG/L (ref 20–75)
HBV CORE IGM SERPL QL IA: NONREACTIVE
HBV SURFACE AB SERPL IA-ACNC: 310.39 M[IU]/ML
HBV SURFACE AG SERPL QL IA: NONREACTIVE

## 2020-11-11 ENCOUNTER — OFFICE VISIT (OUTPATIENT)
Dept: FAMILY MEDICINE | Facility: CLINIC | Age: 35
End: 2020-11-11
Payer: COMMERCIAL

## 2020-11-11 VITALS
WEIGHT: 125 LBS | OXYGEN SATURATION: 99 % | HEIGHT: 64 IN | BODY MASS INDEX: 21.34 KG/M2 | SYSTOLIC BLOOD PRESSURE: 117 MMHG | TEMPERATURE: 96.4 F | DIASTOLIC BLOOD PRESSURE: 76 MMHG | RESPIRATION RATE: 14 BRPM | HEART RATE: 58 BPM

## 2020-11-11 DIAGNOSIS — Z00.00 PREVENTATIVE HEALTH CARE: Primary | ICD-10-CM

## 2020-11-11 DIAGNOSIS — R63.0 ANOREXIA: ICD-10-CM

## 2020-11-11 LAB
% GRANULOCYTES: 74.3 %G (ref 40–75)
ALBUMIN SERPL-MCNC: 4.5 G/DL (ref 3.3–4.6)
ALP SERPL-CCNC: 64 U/L (ref 40–150)
ALT SERPL-CCNC: 60 U/L (ref 0–50)
AST SERPL-CCNC: 82 U/L (ref 0–45)
BILIRUB SERPL-MCNC: 1.2 MG/DL (ref 0.2–1.3)
BILIRUBIN UR: NEGATIVE MG/DL
BLOOD UR: NEGATIVE MG/DL
BUN SERPL-MCNC: 20 MG/DL (ref 5–24)
CALCIUM SERPL-MCNC: 10.3 MG/DL (ref 8.5–10.4)
CHLORIDE SERPLBLD-SCNC: 107 MMOL/L (ref 94–109)
CO2 SERPL-SCNC: 29 MMOL/L (ref 20–32)
CREAT SERPL-MCNC: 0.8 MG/DL (ref 0.6–1.3)
EGFR CALCULATED (BLACK REFERENCE): 104.4
EGFR CALCULATED (NON BLACK REFERENCE): 86.3
ERYTHROCYTE [DISTWIDTH] IN BLOOD BY AUTOMATED COUNT: 12.8 %
GLUCOSE SERPL-MCNC: 83 MG/DL (ref 60–99)
GLUCOSE URINE: NEGATIVE
GRANULOCYTES #: 6 K/UL (ref 1.6–8.3)
HCG UR QL: NEGATIVE
HCT VFR BLD AUTO: 44.5 % (ref 35–47)
HEMOGLOBIN: 14.8 G/DL (ref 11.7–15.7)
KETONES UR QL: NEGATIVE MG/DL
LEUKOCYTE ESTERASE UR: NEGATIVE
LYMPHOCYTES # BLD AUTO: 1.6 K/UL (ref 0.8–5.3)
LYMPHOCYTES NFR BLD AUTO: 19.4 %L (ref 20–48)
MAGNESIUM SERPL-MCNC: 2.6 MG/DL (ref 1.6–2.3)
MCH RBC QN AUTO: 31.2 PG (ref 26.5–35)
MCHC RBC AUTO-ENTMCNC: 33.3 G/DL (ref 32–36)
MCV RBC AUTO: 93.9 FL (ref 78–100)
MID #: 0.5 K/UL (ref 0–2.2)
MID %: 6.3 %M (ref 0–20)
NITRITE UR QL STRIP: NEGATIVE MG/DL
PH UR STRIP: 6 [PH] (ref 4.5–8)
PHOSPHATE SERPL-MCNC: 3.5 MG/DL (ref 2.5–4.5)
PLATELET # BLD AUTO: 288 K/UL (ref 150–450)
POTASSIUM SERPL-SCNC: 5.5 MMOL/L (ref 3.4–5.3)
PROT SERPL-MCNC: 7.6 G/DL (ref 6.8–8.8)
PROTEIN UR: NEGATIVE MG/DL
RBC # BLD AUTO: 4.74 M/UL (ref 3.8–5.2)
SODIUM SERPL-SCNC: 145 MMOL/L (ref 137.3–146.3)
SP GR UR STRIP: <=1.005 (ref 1–1)
UROBILINOGEN UR STRIP-ACNC: NORMAL E.U./DL
WBC # BLD AUTO: 8.1 K/UL (ref 4–11)

## 2020-11-11 RX ORDER — HYDROXYZINE HYDROCHLORIDE 10 MG/1
20 TABLET, FILM COATED ORAL PRN
COMMUNITY
Start: 2019-05-29

## 2020-11-11 ASSESSMENT — MIFFLIN-ST. JEOR: SCORE: 1246.62

## 2020-11-11 NOTE — NURSING NOTE
"35 year old  Chief Complaint   Patient presents with     Forms       Blood pressure 117/76, pulse 58, temperature 96.4  F (35.8  C), temperature source Skin, resp. rate 14, height 1.633 m (5' 4.29\"), weight 56.7 kg (125 lb), last menstrual period 11/02/2020, SpO2 99 %, not currently breastfeeding. Body mass index is 21.26 kg/m .  Patient Active Problem List   Diagnosis     Left hamstring injury, subsequent encounter     Inadequate oral nutritional intake     Anxiety     PTSD (post-traumatic stress disorder)       Wt Readings from Last 2 Encounters:   11/11/20 56.7 kg (125 lb)   06/25/20 54.4 kg (120 lb)     BP Readings from Last 3 Encounters:   11/11/20 117/76   11/15/19 107/71   11/23/18 124/81         Current Outpatient Medications   Medication     hydrOXYzine (ATARAX) 10 MG tablet     hydrOXYzine (ATARAX) 25 MG tablet     No current facility-administered medications for this visit.        Social History     Tobacco Use     Smoking status: Never Smoker     Smokeless tobacco: Never Used   Substance Use Topics     Alcohol use: No     Alcohol/week: 0.0 standard drinks     Drug use: No       Health Maintenance Due   Topic Date Due     PREVENTIVE CARE VISIT  1984     HIV SCREENING  05/09/1999     PAP  05/09/2005     INFLUENZA VACCINE (1) 09/01/2020       No results found for: PAP      November 11, 2020 10:57 AM    "

## 2020-11-11 NOTE — PROGRESS NOTES
SUBJECTIVE:   Maria De Jesus Hess is a 35 year old female who presents to clinic today to discuss the following problem(s).    General Physical for admission to [facility] Disorder Clinic  - she needs form completed  - history of anorexia with previous treatment  - has had recent fluctuation in weight despite daily long distance running and calorie restriction  - advised to return to live-in treatment facility  - needs labs and a physical today before admission to treatment facility    ROS:   CONSTITUTIONAL: NEGATIVE for chills, fatigue, fever, sweats and weight loss  EYES: NEGATIVE for diplopia, eye pain and photophobia  ENT/MOUTH: NEGATIVE for nasal congestion, postnasal drainage and rhinorrhea  RESP: NEGATIVE for cough, SOB/dyspnea and wheezing  CV: NEGATIVE for chest pain/chest pressure, dyspnea on exertion  GI: NEGATIVE for abdominal pain, diarrhea, dysphagia and nausea  : NEGATIVE for dysuria, frequency and hematuria  MUSCULOSKELETAL: NEGATIVE for arthralgias, cyanosis, or edema  INTEGUMENTARY/SKIN: NEGATIVE for new lesions and pruritis  NEURO: NEGATIVE for dizziness/lightheadedness and paresthesias   ENDOCRINE: NEGATIVE for cold intolerance and heat intolerance  HEME/ALLERGY/IMMUNE: NEGATIVE for bleeding disorder and swollen nodes  PSYCHIATRIC: NEGATIVE    Today's PHQ-2:  PHQ-2 ( 1999 Pfizer) 11/11/2020 2/5/2020   Q1: Little interest or pleasure in doing things 0 0   Q2: Feeling down, depressed or hopeless 2 0   PHQ-2 Score 2 0   Q1: Little interest or pleasure in doing things - -   Q2: Feeling down, depressed or hopeless - -   PHQ-2 Score - -       Past Medical History:   Diagnosis Date     Anorexia nervosa      Anxiety      PTSD (post-traumatic stress disorder)      Past Surgical History:   Procedure Laterality Date     NO HISTORY OF SURGERY       Family History   Problem Relation Age of Onset     Skin Cancer Mother      Hyperlipidemia Father      Eating Disorder Sister      Anxiety Disorder Sister      Social  "History     Tobacco Use     Smoking status: Never Smoker     Smokeless tobacco: Never Used   Substance Use Topics     Alcohol use: No     Alcohol/week: 0.0 standard drinks     Drug use: No     Social History     Social History Narrative     Not on file       Current Outpatient Medications   Medication     hydrOXYzine (ATARAX) 10 MG tablet     hydrOXYzine (ATARAX) 25 MG tablet     No current facility-administered medications for this visit.      I have reviewed the patient's past medical, surgical, family, and social history.     OBJECTIVE:   /76 (BP Location: Left arm, Patient Position: Sitting, Cuff Size: Adult Regular)   Pulse 58   Temp 96.4  F (35.8  C) (Skin)   Resp 14   Ht 1.633 m (5' 4.29\")   Wt 56.7 kg (125 lb)   LMP 11/02/2020 (Exact Date)   SpO2 99%   BMI 21.26 kg/m      Constitutional: well-appearing, appears stated age  Eyes: conjunctivae without erythema, sclera anicteric  ENT: moderate cerumen on RIGHT, TMs pearlescent and non-bulging; nasopharynx without erythema or drainage; no lymphadenopathy, thyroid unremarkable  Cardiac: bradycardic rate with regular rhythm, normal S1/S2, no murmur/rubs/gallops  Respiratory: lungs clear to auscultation bilaterally, normal work of breathing, no wheezes/crackles  Skin: no rashes, lesions, or wounds  Psych: affect is full and appropriate, speech is fluent and non-pressured    .result    ASSESSMENT AND PLAN:     Maria De Jesus was seen today for forms.    Diagnoses and all orders for this visit:    Preventative health care  -     EKG 12-lead complete w/read - Clinics  -     Comprehensive Metabolic Panel ([place])  -     Magnesium  -     Phosphorus  -     CBC with Diff Plt ([facility])  -     Urinalysis([facility])  -     HCG Qualitative Urine ([place])    Anorexia    Physical exam unremarkable for anything concerning for anticipated admission to treatment program.     EKG unremarkable. Labs are pending. Plan to notify patient of lab results as available.     Wilfrid GROVES" MD [facility]  [facility]  11/11/2020, 10:59 AM

## 2020-12-20 ENCOUNTER — HEALTH MAINTENANCE LETTER (OUTPATIENT)
Age: 35
End: 2020-12-20

## 2021-04-18 ENCOUNTER — HEALTH MAINTENANCE LETTER (OUTPATIENT)
Age: 36
End: 2021-04-18

## 2021-05-26 DIAGNOSIS — F50.00 ANOREXIA NERVOSA (H): Primary | ICD-10-CM

## 2021-10-03 ENCOUNTER — HEALTH MAINTENANCE LETTER (OUTPATIENT)
Age: 36
End: 2021-10-03

## 2022-05-14 ENCOUNTER — HEALTH MAINTENANCE LETTER (OUTPATIENT)
Age: 37
End: 2022-05-14

## 2022-09-04 ENCOUNTER — HEALTH MAINTENANCE LETTER (OUTPATIENT)
Age: 37
End: 2022-09-04

## 2023-06-03 ENCOUNTER — HEALTH MAINTENANCE LETTER (OUTPATIENT)
Age: 38
End: 2023-06-03

## 2024-05-07 NOTE — LETTER
"  8/23/2017      RE: Maria De Jesus Hess  [address]  [place] [address]        Subjective:   Maria De Jesus Hess is a 32 year old female who:    -Stress fractures:  2 stress reactions  Metatarsal and tibia 1 stress fracture pelvis ischium (?)  -No other fractures  -No fam hx of osteoporosis although MGM with kyphosis  - Height 5 ft 4 in no ht loss  -No tob  -Eating disorder for years likely since .  No previous treatment until seeing Mary in 2016.    -Basket ball was main sport starting in 9th grade  -Running in , serious about running in college  -Runs 50-60 miles per wk; typically does two marathons per year  -Seeing Psychiatry at  and dx'd with anorexia nervosa in May 2017.   -Calcium:  Greek yogurt 1 cup; almond milk daily, 1/2 cup per day of chocolate milk, +cheese string cheese  -Menarche;  7-8th or 8th grade  Menses:  Never amenorrheic, but tends to be irregular (35-36 days apart), super light; no OCPs or IUD.    -Doesn't eat red meat.   -Problems with fatigue  -She has been seeing Mary Song, Sports Dietician and a Psychologist for ED-NOS.  Mary has encouraged her over the last year to seek and medical evaluation and the patient is finally willing to follow thru with that recommendation.         PAST MEDICAL, SOCIAL, SURGICAL AND FAMILY HISTORY:     PMH:  Anorexia Nervosa    She has no h/o surgery    Neg Fam Hx of osteoporosis    She reports that she has never smoked. She does not have any smokeless tobacco history on file.      ALLERGIES: She is allergic to sulfa drugs.    CURRENT MEDICATIONS: None  Take Vit C, iron supplement 3-4 times per week.    REVIEW OF SYSTEMS: 12 point review of systems is negative except as noted above.     Exam:   /80 (BP Location: Right arm, Patient Position: Standing)  Pulse 62  Ht 5' 4\" (1.626 m)  Wt 119 lb 4.8 oz (54.1 kg)  BMI 20.48 kg/m2          CONSTITUTIONAL: healthy, alert and no distress  HEAD: Normocephalic. No masses, lesions, tenderness or " abnormalities  SKIN: no suspicious lesions or rashes  GAIT:   NEUROLOGIC: Non-focal  PSYCHIATRIC: affect normal/bright and mentation appears normal.    HEENT:  Neg  Neck: no lad and no thyromegaly  Lungs: CTA  Heart: rrr  Abd;  Soft and nttp  Ext:  No c/c/e and no stigmata of eating disorder.       TRIAD Risk Factors  Low EA withor without DE/ED: Meets DSM-V for ED  Low BMI: BMI > 18.5 or > 90% EW** or weight stable  Delayed Menarche: Menarche before 15 years  Oligomenorrhea and/or Amenorrhea: 6 - 9 menses in 12 months  Stress Reaction/Fracture: > 2, 1 or more with high risk or of trabecular bone sites  Specific Bone(s): Tibia, Pelvis, Metatarsal  TRIAD Score  Risk Score Status: Provisional  Cumulative Risk: 5 - Moderate Risk  Medical Plan: DXA and labs, continue treatment with  Psychiatry, Sports Dietician and Psychologist       Assessment/Plan:   Anorexia Nervosa  Multiple Bone Stress Injuries  Triad Risk Factor Score:  5 = High Risk    PLAN:   -DXA for BMD and body composition  -Labs for AN, oligo and multiple stress injuries  -Discussed of calcium and Vit D intake.  I have recommended the IOF Calcium Calculator.   -RTC to review the imaging and labs  -Continue to see  Psychiatry, Sports Dietician and Pyschologist      > 45 min of total time spent in one-on-one evalution and discussion with patient regarding nature of problem, course, prior treatments, and therapeutic options, at least 50% of which was spent in counseling and coordination of care:  Ludy Betts MD, CAQ, FACSM, CCD  [facility]  Sports Medicine and Bone Health  Team Physician;  Athletics       3 = A little assistance

## 2024-11-13 NOTE — PROGRESS NOTES
----- Message from Rosetta sent at 11/12/2024  2:11 PM CST -----  New patient of aNye Hairston    Patient is scheduled for 12/12/24 at 10:30    Fibro Scan scheduled for 12/31/24 at 3pm    [phone]  2:12  Thanks,   Maria De Jesus Hess  is a 31 year old female presents today for follow-up nutrition consultation.   She started [facility] 1/3/18 and stayed for two weeks. She has had a very difficult month and still struggles with over exercising and restricting.  Maria De Jesus had a long run on Saturday but fueled before, during and after.    Food preferences:  No red meat, no pork, no meat with skins and bones, no regular milk, no colon.  Ok with other meat, almond milk, Fairlife milk, miracle whilp  Challenge foods:  Grains, deserts, processed foods, rice, potatoes, salad dressing.  Meal plan total:  Grain=8, Protein=9, milk=3, veggies=3, fruit=3, fat=3, dessert=1  Energy needs:  0388-9399 kcals, 360-420g carbs, 96-120g protein, 50-65g fat.        Vitals:  Wt Readings from Last 4 Encounters:   11/08/17 54.1 kg (119 lb 3.2 oz)   10/11/17 55 kg (121 lb 4.1 oz)   09/28/17 53.3 kg (117 lb 6.4 oz)   09/22/17 55.1 kg (121 lb 8 oz)       Nutrition History  Patient is on a regular  diet at home.  9:45:  none  12:30:  2 eggs with salsa  17:30:  Salad:  Spinach, salsa, cheese, 1/2 avocado, 2 string  Cheese  Bedtime snack: Greek yogurt, almonds, and 3 Tbsp potato starch     Physical Activity  Technically not allowed to run but has been running when she gets a pass    Time with Patient: 60 minutes    Nutrition  DX:.   1. Dietary counseling    2. Protein-calorie undernutrition (H)    3. Anorexia           Nutrition Goals:   1: Take pictures of meals at home.  2: Follow through with recommendation of the medical team on running and eating.    Mary Song, MS, RD, CSSD, LD  [facility]

## 2025-01-21 NOTE — PROGRESS NOTES
"Maria De Jesus Hess is a 35 year old female who is being evaluated via a billable video visit.      The patient has been notified of following:     \"This video visit will be conducted via a call between you and your physician/provider. We have found that certain health care needs can be provided without the need for an in-person physical exam.  This service lets us provide the care you need with a video conversation.  If a prescription is necessary we can send it directly to your pharmacy.  If lab work is needed we can place an order for that and you can then stop by our lab to have the test done at a later time.    Video visits are billed at different rates depending on your insurance coverage.  Please reach out to your insurance provider with any questions.     If during the course of the call the physician/provider feels a video visit is not appropriate, you will not be charged for this service.\"    Patient has given verbal consent for Video visit? Yes    How would you like to obtain your AVS? Toan    Patient would like the video invitation sent by: Send to e-mail at: [email or web address]     Will anyone else be joining your video visit? No        Video-Visit Details    Type of service:  Video Visit    Video Time: 45 minutes    Originating Location (pt. Location): home    Distant Location (provider location):   [facility]     Platform used for Video Visit: Kaushik ALEJANDROLorne Pretty is a 36 yo female with anorexia nervosa who presents for a video visit.  She wouldn't follow the recommendations of her Dietician and Therapist regarding pursuing an appt with me and evaluation of her labs and the LE edema occurring after running 16 miles or more despite multiple attempts to get her to engage including a video appt with myself and them and Maria De Jesus explaining the concerns behind this recommendation.  I had offered to see Maria De Jesus as a special appt in-person a few weeks ago during a week when I was only seeing patients " virtually.  Maria De Jesus did not follow thru with letting me know if she would come in or not after considering it further.  They have formally discharged her from their care.  Maria De Jesus feels abandoned and this makes her worried because she realizes that she needs professional support and won't improve without it.  She did call [facility] and find out if they would accept her back into the residential treatment program.  Maria De Jesus reports that they are not doing that due to COVID 19.  They are also not doing virtual visits per Maria De Jesus.  Maria De Jesus was previously successful with treatment there and is willing [place].  In the past, Maria De Jesus has also been in treatment at [place] but wasn't successful there.  She isn't interested in going back there.      Maria De Jesus denies further LE edema since the most she is running at one time now is 14 miles at a time and she wasn't getting that swelling until running 16 or more miles.  Maria De Jesus reports running 70 miles per week but eating less than 1000 calories per day for weeks now although she does report increasing her intake more over the last two weeks. She denies  CP, abd pain, SOB REA at rest or with exercising.      Working from home full time.        O:  NAD  Anxious but less than our virtual meeting arranged thru the [facility] about two weeks ago.              A:  Anorexia Nervosa:  Currently not doing well and now without Nutrition and Therapy support.    Excessive running    Normal but decreasing BMD    Lower LE edema with running 16 miles or greater    Bradycardia:  Combination of AN and running but trending lower per Zio patch but not as bad as when she was in residential treatment at [facility].     PEARL:  No meds for this.  Needs Psychiatry appt        PLAN:  Maria De Jesus and I have had a lengthy discussion about the difficulty of getting her treatment given her struggles and discharge from care along with the COVID pandemic.  I would like her to do the followin..  [place] [facility] [place] a [place] and decide if further cardiac evaluation is needed, check orthostatics and obtain current lab work.    She is very anxious about coming into the [facility] due to COVID 19.  I have offered to have her see a Physician in [place] (Dr. Vicente Ortega) to evaluate her and determine if he thinks a CXR, EKG etc are warranted.  She is more comfortable with this plan as low as she doesn't have to be weighed in.  I will discuss this with Dr. Ortega and see if he is willing to do this.     2.  I will also discuss her case with Dr. Cierra Wynne, Psychologist, who works with athletes and also has an extensive background treating eating disordered patients to see if she is willing to have Maria De Jesus as a patient.  Maria De Jesus is comfortable with me discussing her case with Dr. Wynne.      3.  Will notify Maria De Jesus's Dietician, who has a practice independent of the [facility], Mary Song to see if she would be willing to accept Maria De Jesus back if Maria De Jesus is following thru on the medical evaluation side of the equation to treat her in conjunction with Dr. Wynne and myself.     4.  I gave Maria De Jesus my cell phone and she will text me by tomorrow morning if she changes her mind on any portion of this plan before I reach out to these professionals.        Ludy Betts MD, CAQ, FACSM, CCD  [facility]  Sports Medicine and Bone Health  Team Physician;  Athletics     Imaging Studies/Medications